# Patient Record
Sex: MALE | HISPANIC OR LATINO | Employment: FULL TIME | URBAN - METROPOLITAN AREA
[De-identification: names, ages, dates, MRNs, and addresses within clinical notes are randomized per-mention and may not be internally consistent; named-entity substitution may affect disease eponyms.]

---

## 2023-05-24 PROCEDURE — 99283 EMERGENCY DEPT VISIT LOW MDM: CPT

## 2023-05-25 ENCOUNTER — HOSPITAL ENCOUNTER (EMERGENCY)
Facility: CLINIC | Age: 38
Discharge: HOME | End: 2023-05-25
Attending: EMERGENCY MEDICINE | Admitting: EMERGENCY MEDICINE

## 2023-05-25 VITALS
RESPIRATION RATE: 18 BRPM | WEIGHT: 180 LBS | DIASTOLIC BLOOD PRESSURE: 83 MMHG | TEMPERATURE: 97.1 F | OXYGEN SATURATION: 97 % | HEART RATE: 61 BPM | SYSTOLIC BLOOD PRESSURE: 142 MMHG

## 2023-05-25 DIAGNOSIS — L60.0 INGROWN TOENAIL OF LEFT FOOT: ICD-10-CM

## 2023-05-25 PROCEDURE — 250N000013 HC RX MED GY IP 250 OP 250 PS 637: Performed by: EMERGENCY MEDICINE

## 2023-05-25 RX ORDER — CEPHALEXIN 500 MG/1
500 CAPSULE ORAL 4 TIMES DAILY
Qty: 28 CAPSULE | Refills: 0 | Status: SHIPPED | OUTPATIENT
Start: 2023-05-25 | End: 2023-06-01

## 2023-05-25 RX ORDER — IBUPROFEN 600 MG/1
600 TABLET, FILM COATED ORAL ONCE
Status: COMPLETED | OUTPATIENT
Start: 2023-05-25 | End: 2023-05-25

## 2023-05-25 RX ORDER — CEPHALEXIN 500 MG/1
500 CAPSULE ORAL ONCE
Status: COMPLETED | OUTPATIENT
Start: 2023-05-25 | End: 2023-05-25

## 2023-05-25 RX ADMIN — IBUPROFEN 600 MG: 600 TABLET, FILM COATED ORAL at 01:32

## 2023-05-25 RX ADMIN — CEPHALEXIN 500 MG: 500 CAPSULE ORAL at 01:32

## 2023-05-25 ASSESSMENT — ACTIVITIES OF DAILY LIVING (ADL): ADLS_ACUITY_SCORE: 33

## 2023-05-25 NOTE — ED PROVIDER NOTES
History     Chief Complaint:  Toe pain    The history is provided by the patient, a parent and a relative.      Marko Pastor is a 37 year old male who presents with ingrown nail on his left big toe. He has had this for two months. He tried to remove it himself but was not successful. The patient is not on any medication and has had no prior surgeries.       Independent Historian:    History was given by the patient and his family.     Review of External Notes:  None    ROS:  See HPI    Allergies:  No Known Allergies     Physical Exam     Patient Vitals for the past 24 hrs:   BP Temp Temp src Pulse Resp SpO2 Weight   05/25/23 0129 -- -- -- -- -- -- 81.6 kg (180 lb)   05/24/23 2259 (!) 142/83 97.1  F (36.2  C) Temporal 61 18 97 % --        Physical Exam  HENT:  mmm. Normal phonation.  Eyes: PERRL B/L  Neck: Supple  CV: Peripheral pulses in tact and regular  Resp: Speaking in full sentences without any respiratory distress  Musculoskeletal:  Left foot     No apparent bony tenderness.  There appears to be an ingrown toenail on the medial aspect of the left great toe.     Skin: Warm, dry, well perfused.  Mild amount of bleeding at the medial aspect of the left great toenail.  Patient reportedly was digging around in that area to try to remove the ingrown toenail.  Neuro: Alert, no gross motor or sensory deficits    Emergency Department Course   Interventions:  Medications   cephALEXin (KEFLEX) capsule 500 mg (500 mg Oral $Given 5/25/23 0132)   ibuprofen (ADVIL/MOTRIN) tablet 600 mg (600 mg Oral $Given 5/25/23 0132)        Assessments, Independent Interpretation, Consult/Discussion of ManagementTests:  ED Course as of 05/25/23 0137   u May 25, 2023   0115 I examined the patient and obtained the history above   0116 I obtained the history and examined the patient as noted above.          Social Determinants of Health affecting care:  None    Disposition:  The patient was discharged to home.     Impression &  Plan      Medical Decision Making:  This 37-year-old male patient presents to the ED due to concern for ingrown toenail.  Please see the HPI and exam for specifics.  Based on the patient's exam I believe he does have an ingrown toenail.  It seems like he tried to excise or cut some of this out though was not successful.  There is some bleeding though this is not a very large amount.  At this point I think that general wound care and consideration for antibiotic treatment (tablets) is reasonable.  I will encourage outpatient podiatry follow-up for formal excision of this ingrown toenail.     Critical Care time:  was 0 minutes for this patient excluding procedures.    Diagnosis:    ICD-10-CM    1. Ingrown toenail of left foot  L60.0            Discharge Medications:  New Prescriptions    No medications on file      5/25/2023   Jr Fabian DO     Scribe Disclosure:  ELIJAH GARDUNO, am serving as a scribe at 1:21 AM on 5/25/2023 to document services personally performed by Jr Fabian DO based on my observations and the provider's statements to me.    Hiral GARDUNO, am serving as a scribe  at 1:26 AM on 5/25/2023 to document services personally performed by Jr Fabian DO based on my observations and the provider's statements to me.       Jr Fabian DO  05/25/23 0137

## 2023-05-25 NOTE — ED TRIAGE NOTES
Patient reports his left big toe nail is ingrown.  He reports ongoing pain for about 1 month.  ABCs intact, A&Ox4.     Triage Assessment     Row Name 05/24/23 6217       Triage Assessment (Adult)    Airway WDL WDL       Respiratory WDL    Respiratory WDL WDL       Skin Circulation/Temperature WDL    Skin Circulation/Temperature WDL WDL       Cardiac WDL    Cardiac WDL WDL       Peripheral/Neurovascular WDL    Peripheral Neurovascular WDL WDL       Cognitive/Neuro/Behavioral WDL    Cognitive/Neuro/Behavioral WDL WDL

## 2023-05-25 NOTE — DISCHARGE INSTRUCTIONS
What do you do next:   Continue your home medications unless we have specifically changed them  You can use over-the-counter acetaminophen (Tylenol ) and ibuprofen for pain control for the next 2 to 3 days.  Acetaminophen (Tylenol): Take 500 to 1000 mg by mouth every 6 hours as needed for fever or pain.  Do not take more than 4000 total milligrams of acetaminophen-containing products in a 24-hour timeframe.  Ibuprofen: Take 600 milligrams by mouth every 6-8 hours as needed for fever or pain.  Take this with food or milk to avoid stomach upset.  Take the antibiotics I prescribed as directed.  Follow up as indicated below    When do you return: If you have uncontrollable pain, persistent bloody or foul-smelling drainage from the ingrown toenail, or any other symptoms that concern you, please return to the ED for reevaluation.    Thank you for allowing us to care for you today.

## 2024-01-07 ENCOUNTER — OFFICE VISIT (OUTPATIENT)
Dept: URGENT CARE | Facility: URGENT CARE | Age: 39
End: 2024-01-07

## 2024-01-07 ENCOUNTER — HOSPITAL ENCOUNTER (INPATIENT)
Facility: CLINIC | Age: 39
LOS: 7 days | Discharge: HOME | DRG: 391 | End: 2024-01-14
Attending: EMERGENCY MEDICINE | Admitting: HOSPITALIST

## 2024-01-07 ENCOUNTER — APPOINTMENT (OUTPATIENT)
Dept: CT IMAGING | Facility: CLINIC | Age: 39
DRG: 391 | End: 2024-01-07
Attending: EMERGENCY MEDICINE

## 2024-01-07 VITALS
SYSTOLIC BLOOD PRESSURE: 124 MMHG | TEMPERATURE: 99.7 F | HEART RATE: 85 BPM | DIASTOLIC BLOOD PRESSURE: 72 MMHG | OXYGEN SATURATION: 98 %

## 2024-01-07 DIAGNOSIS — R10.32 LLQ ABDOMINAL PAIN: Primary | ICD-10-CM

## 2024-01-07 DIAGNOSIS — K57.20 DIVERTICULITIS OF COLON WITH PERFORATION: ICD-10-CM

## 2024-01-07 LAB
ALBUMIN SERPL BCG-MCNC: 4.2 G/DL (ref 3.5–5.2)
ALBUMIN UR-MCNC: 30 MG/DL
ALP SERPL-CCNC: 67 U/L (ref 40–150)
ALT SERPL W P-5'-P-CCNC: 27 U/L (ref 0–70)
ANION GAP SERPL CALCULATED.3IONS-SCNC: 11 MMOL/L (ref 7–15)
APPEARANCE UR: CLEAR
AST SERPL W P-5'-P-CCNC: 16 U/L (ref 0–45)
BASOPHILS # BLD AUTO: 0 10E3/UL (ref 0–0.2)
BASOPHILS NFR BLD AUTO: 0 %
BILIRUB SERPL-MCNC: 0.7 MG/DL
BILIRUB UR QL STRIP: NEGATIVE
BUN SERPL-MCNC: 10.7 MG/DL (ref 6–20)
CALCIUM SERPL-MCNC: 9 MG/DL (ref 8.6–10)
CHLORIDE SERPL-SCNC: 99 MMOL/L (ref 98–107)
COLOR UR AUTO: YELLOW
CREAT SERPL-MCNC: 0.82 MG/DL (ref 0.67–1.17)
DEPRECATED HCO3 PLAS-SCNC: 26 MMOL/L (ref 22–29)
EGFRCR SERPLBLD CKD-EPI 2021: >90 ML/MIN/1.73M2
EOSINOPHIL # BLD AUTO: 0.1 10E3/UL (ref 0–0.7)
EOSINOPHIL NFR BLD AUTO: 0 %
ERYTHROCYTE [DISTWIDTH] IN BLOOD BY AUTOMATED COUNT: 12 % (ref 10–15)
GLUCOSE SERPL-MCNC: 106 MG/DL (ref 70–99)
GLUCOSE UR STRIP-MCNC: NEGATIVE MG/DL
HCT VFR BLD AUTO: 42.9 % (ref 40–53)
HGB BLD-MCNC: 14.8 G/DL (ref 13.3–17.7)
HGB UR QL STRIP: ABNORMAL
IMM GRANULOCYTES # BLD: 0.1 10E3/UL
IMM GRANULOCYTES NFR BLD: 1 %
KETONES UR STRIP-MCNC: 40 MG/DL
LEUKOCYTE ESTERASE UR QL STRIP: NEGATIVE
LIPASE SERPL-CCNC: 15 U/L (ref 13–60)
LYMPHOCYTES # BLD AUTO: 1.6 10E3/UL (ref 0.8–5.3)
LYMPHOCYTES NFR BLD AUTO: 9 %
MCH RBC QN AUTO: 31.3 PG (ref 26.5–33)
MCHC RBC AUTO-ENTMCNC: 34.5 G/DL (ref 31.5–36.5)
MCV RBC AUTO: 91 FL (ref 78–100)
MONOCYTES # BLD AUTO: 1 10E3/UL (ref 0–1.3)
MONOCYTES NFR BLD AUTO: 6 %
NEUTROPHILS # BLD AUTO: 14.8 10E3/UL (ref 1.6–8.3)
NEUTROPHILS NFR BLD AUTO: 84 %
NITRATE UR QL: NEGATIVE
NRBC # BLD AUTO: 0 10E3/UL
NRBC BLD AUTO-RTO: 0 /100
PH UR STRIP: 6 [PH] (ref 5–7)
PLATELET # BLD AUTO: 399 10E3/UL (ref 150–450)
POTASSIUM SERPL-SCNC: 3.9 MMOL/L (ref 3.4–5.3)
PROT SERPL-MCNC: 8.6 G/DL (ref 6.4–8.3)
RBC # BLD AUTO: 4.73 10E6/UL (ref 4.4–5.9)
RBC URINE: 7 /HPF
SODIUM SERPL-SCNC: 136 MMOL/L (ref 135–145)
SP GR UR STRIP: 1.03 (ref 1–1.03)
SQUAMOUS EPITHELIAL: 1 /HPF
UROBILINOGEN UR STRIP-MCNC: 2 MG/DL
WBC # BLD AUTO: 17.6 10E3/UL (ref 4–11)
WBC URINE: 2 /HPF

## 2024-01-07 PROCEDURE — 250N000009 HC RX 250: Performed by: EMERGENCY MEDICINE

## 2024-01-07 PROCEDURE — 250N000011 HC RX IP 250 OP 636: Performed by: HOSPITALIST

## 2024-01-07 PROCEDURE — 81001 URINALYSIS AUTO W/SCOPE: CPT | Performed by: EMERGENCY MEDICINE

## 2024-01-07 PROCEDURE — 96374 THER/PROPH/DIAG INJ IV PUSH: CPT | Mod: 59

## 2024-01-07 PROCEDURE — 120N000001 HC R&B MED SURG/OB

## 2024-01-07 PROCEDURE — 250N000011 HC RX IP 250 OP 636: Performed by: EMERGENCY MEDICINE

## 2024-01-07 PROCEDURE — 99207 PR NO CHARGE LOS: CPT | Performed by: FAMILY MEDICINE

## 2024-01-07 PROCEDURE — 80053 COMPREHEN METABOLIC PANEL: CPT | Performed by: EMERGENCY MEDICINE

## 2024-01-07 PROCEDURE — 99285 EMERGENCY DEPT VISIT HI MDM: CPT | Mod: 25

## 2024-01-07 PROCEDURE — 258N000003 HC RX IP 258 OP 636: Performed by: HOSPITALIST

## 2024-01-07 PROCEDURE — 99222 1ST HOSP IP/OBS MODERATE 55: CPT | Performed by: HOSPITALIST

## 2024-01-07 PROCEDURE — 85025 COMPLETE CBC W/AUTO DIFF WBC: CPT | Performed by: EMERGENCY MEDICINE

## 2024-01-07 PROCEDURE — 36415 COLL VENOUS BLD VENIPUNCTURE: CPT | Performed by: EMERGENCY MEDICINE

## 2024-01-07 PROCEDURE — 74177 CT ABD & PELVIS W/CONTRAST: CPT

## 2024-01-07 PROCEDURE — 83690 ASSAY OF LIPASE: CPT | Performed by: EMERGENCY MEDICINE

## 2024-01-07 RX ORDER — CALCIUM CARBONATE 500 MG/1
1000 TABLET, CHEWABLE ORAL 4 TIMES DAILY PRN
Status: DISCONTINUED | OUTPATIENT
Start: 2024-01-07 | End: 2024-01-14 | Stop reason: HOSPADM

## 2024-01-07 RX ORDER — SODIUM CHLORIDE, SODIUM LACTATE, POTASSIUM CHLORIDE, CALCIUM CHLORIDE 600; 310; 30; 20 MG/100ML; MG/100ML; MG/100ML; MG/100ML
INJECTION, SOLUTION INTRAVENOUS CONTINUOUS
Status: DISCONTINUED | OUTPATIENT
Start: 2024-01-07 | End: 2024-01-13

## 2024-01-07 RX ORDER — PIPERACILLIN SODIUM, TAZOBACTAM SODIUM 4; .5 G/20ML; G/20ML
4.5 INJECTION, POWDER, LYOPHILIZED, FOR SOLUTION INTRAVENOUS EVERY 6 HOURS
Status: DISCONTINUED | OUTPATIENT
Start: 2024-01-08 | End: 2024-01-14

## 2024-01-07 RX ORDER — IOPAMIDOL 755 MG/ML
500 INJECTION, SOLUTION INTRAVASCULAR ONCE
Status: COMPLETED | OUTPATIENT
Start: 2024-01-07 | End: 2024-01-07

## 2024-01-07 RX ORDER — PIPERACILLIN SODIUM, TAZOBACTAM SODIUM 4; .5 G/20ML; G/20ML
4.5 INJECTION, POWDER, LYOPHILIZED, FOR SOLUTION INTRAVENOUS ONCE
Status: COMPLETED | OUTPATIENT
Start: 2024-01-07 | End: 2024-01-07

## 2024-01-07 RX ORDER — HYDROMORPHONE HCL IN WATER/PF 6 MG/30 ML
0.4 PATIENT CONTROLLED ANALGESIA SYRINGE INTRAVENOUS
Status: DISCONTINUED | OUTPATIENT
Start: 2024-01-07 | End: 2024-01-14 | Stop reason: HOSPADM

## 2024-01-07 RX ORDER — ONDANSETRON 4 MG/1
4 TABLET, ORALLY DISINTEGRATING ORAL EVERY 6 HOURS PRN
Status: DISCONTINUED | OUTPATIENT
Start: 2024-01-07 | End: 2024-01-14 | Stop reason: HOSPADM

## 2024-01-07 RX ORDER — OXYCODONE HYDROCHLORIDE 5 MG/1
5 TABLET ORAL EVERY 4 HOURS PRN
Status: DISCONTINUED | OUTPATIENT
Start: 2024-01-07 | End: 2024-01-14 | Stop reason: HOSPADM

## 2024-01-07 RX ORDER — KETOROLAC TROMETHAMINE 15 MG/ML
15 INJECTION, SOLUTION INTRAMUSCULAR; INTRAVENOUS ONCE
Status: COMPLETED | OUTPATIENT
Start: 2024-01-07 | End: 2024-01-07

## 2024-01-07 RX ORDER — HYDROMORPHONE HYDROCHLORIDE 1 MG/ML
0.5 INJECTION, SOLUTION INTRAMUSCULAR; INTRAVENOUS; SUBCUTANEOUS EVERY 30 MIN PRN
Status: DISCONTINUED | OUTPATIENT
Start: 2024-01-07 | End: 2024-01-09

## 2024-01-07 RX ORDER — LIDOCAINE 40 MG/G
CREAM TOPICAL
Status: DISCONTINUED | OUTPATIENT
Start: 2024-01-07 | End: 2024-01-14 | Stop reason: HOSPADM

## 2024-01-07 RX ORDER — ACETAMINOPHEN 325 MG/1
650 TABLET ORAL EVERY 4 HOURS PRN
Status: DISCONTINUED | OUTPATIENT
Start: 2024-01-07 | End: 2024-01-14 | Stop reason: HOSPADM

## 2024-01-07 RX ORDER — ONDANSETRON 2 MG/ML
4 INJECTION INTRAMUSCULAR; INTRAVENOUS EVERY 30 MIN PRN
Status: DISCONTINUED | OUTPATIENT
Start: 2024-01-07 | End: 2024-01-14 | Stop reason: HOSPADM

## 2024-01-07 RX ORDER — ACETAMINOPHEN 650 MG/1
650 SUPPOSITORY RECTAL EVERY 4 HOURS PRN
Status: DISCONTINUED | OUTPATIENT
Start: 2024-01-07 | End: 2024-01-14 | Stop reason: HOSPADM

## 2024-01-07 RX ORDER — HYDROMORPHONE HCL IN WATER/PF 6 MG/30 ML
0.2 PATIENT CONTROLLED ANALGESIA SYRINGE INTRAVENOUS
Status: DISCONTINUED | OUTPATIENT
Start: 2024-01-07 | End: 2024-01-14 | Stop reason: HOSPADM

## 2024-01-07 RX ORDER — ONDANSETRON 2 MG/ML
4 INJECTION INTRAMUSCULAR; INTRAVENOUS EVERY 6 HOURS PRN
Status: DISCONTINUED | OUTPATIENT
Start: 2024-01-07 | End: 2024-01-14 | Stop reason: HOSPADM

## 2024-01-07 RX ADMIN — IOPAMIDOL 100 ML: 755 INJECTION, SOLUTION INTRAVENOUS at 16:28

## 2024-01-07 RX ADMIN — PIPERACILLIN AND TAZOBACTAM 4.5 G: 4; .5 INJECTION, POWDER, FOR SOLUTION INTRAVENOUS at 18:13

## 2024-01-07 RX ADMIN — KETOROLAC TROMETHAMINE 15 MG: 15 INJECTION, SOLUTION INTRAMUSCULAR; INTRAVENOUS at 15:47

## 2024-01-07 RX ADMIN — SODIUM CHLORIDE 65 ML: 9 INJECTION, SOLUTION INTRAVENOUS at 16:28

## 2024-01-07 RX ADMIN — SODIUM CHLORIDE, POTASSIUM CHLORIDE, SODIUM LACTATE AND CALCIUM CHLORIDE: 600; 310; 30; 20 INJECTION, SOLUTION INTRAVENOUS at 20:18

## 2024-01-07 RX ADMIN — PIPERACILLIN AND TAZOBACTAM 4.5 G: 4; .5 INJECTION, POWDER, FOR SOLUTION INTRAVENOUS at 23:27

## 2024-01-07 RX ADMIN — HYDROMORPHONE HYDROCHLORIDE 0.5 MG: 1 INJECTION, SOLUTION INTRAMUSCULAR; INTRAVENOUS; SUBCUTANEOUS at 23:27

## 2024-01-07 ASSESSMENT — ACTIVITIES OF DAILY LIVING (ADL)
ADLS_ACUITY_SCORE: 35

## 2024-01-07 ASSESSMENT — PAIN SCALES - GENERAL: PAINLEVEL: EXTREME PAIN (9)

## 2024-01-07 NOTE — H&P
[facility]    History and Physical  Hospitalist       Date of Admission:  1/7/2024    Assessment & Plan   Marko Pastor is a 38 year old male without any significant past medical history who presents with abdominal pain.    # Perforated sigmoid diverticulitis with trace pneumoperitoneum and small phlegmon: Patient notes that he has had left lower quadrant abdominal pain that started about 1.5 weeks ago.  He notes that it has been worse when he has a bowel movement or when he moves suddenly.  He did have 1 episode of nausea with nonbloody emesis yesterday.  Denies any loose or watery stools.  No blood in the stool.  He notes that his abdominal pain worsened this morning so went to urgent care for presentation.  He denies any prior hospitalizations.  No prior colonoscopies.  He is not taking any prescription medications.  He works as a  and is on his feet for most of the day.  -ED, patient with low-grade fever of 99.7, nontachycardic and normotensive.  Saturating okay on room air.  CBC with leukocytosis to 17.6.  BMP unremarkable.  LFTs within normal limits.  Lipase within normal limits.  UA unremarkable.  CT abdomen pelvis done that showed a perforated sigmoid diverticulitis with trace pneumoperitoneum and small phlegmon adjacent to the sigmoid colon with no coalescing abscess or drainable collection.  Patient given Zosyn therapy.  -Bowel rest with N.p.o., IV fluids.  As needed pain medications are available.  Continue IV Zosyn.  -Colorectal surgery consulted.      DVT Prophylaxis: Pneumatic Compression Devices  Code Status: Full Code  Dispo: Admit to inpatient    Eric Rios MD    Primary Care Physician   Physician No Ref-Primary    Chief Complaint   Abdominal pain    History is obtained from the patient, patient's chart and discussed with ER physician    An iPhone  was utilized to interview the patient and obtain history.    History of Present Illness   Marko  Arsalan Pastor is a 38 year old male without any significant past medical history who presents with abdominal pain.    Patient notes that he has had left lower quadrant abdominal pain that started about 1.5 weeks ago.  He notes that it has been worse when he has a bowel movement or when he moves suddenly.  He did have 1 episode of nausea with nonbloody emesis yesterday.  Denies any loose or watery stools.  No blood in the stool.  He notes that his abdominal pain worsened this morning so went to urgent care for presentation.  He denies any prior hospitalizations.  No prior colonoscopies.  He is not taking any prescription medications.  He works as a  and is on his feet for most of the day.    In the ED, patient with low-grade fever of 99.7, nontachycardic and normotensive.  Saturating okay on room air.  CBC with leukocytosis to 17.6.  BMP unremarkable.  LFTs within normal limits.  Lipase within normal limits.  UA unremarkable.  CT abdomen pelvis done that showed a perforated sigmoid diverticulitis with trace pneumoperitoneum and small phlegmon adjacent to the sigmoid colon with no coalescing abscess or drainable collection.  Patient given Zosyn therapy.    Past Medical History    None    Past Surgical History   None    Prior to Admission Medications   None     Allergies   No Known Allergies    Social History   I have reviewed this patient's social history and updated it with pertinent information if needed. Marko Pastor  reports that he has never smoked. He has never used smokeless tobacco.    Family History   I have reviewed this patient's family history and updated it with pertinent information if needed.   No family history on file.    Review of Systems   The 10 point Review of Systems is negative other than noted in the HPI or here.     Physical Exam   Temp: 99.7  F (37.6  C) Temp src: Oral BP: (!) 125/118 Pulse: 72   Resp: 18 SpO2: 100 % O2 Device: None (Room air)    Vital Signs with  Ranges  Temp:  [99.7  F (37.6  C)] 99.7  F (37.6  C)  Pulse:  [72-85] 72  Resp:  [18] 18  BP: (124-125)/() 125/118  SpO2:  [98 %-100 %] 100 %  199 lbs 11.79 oz    Constitutional: NAD, Nontoxic  HEENT: Normocephalic, MMM, no elevation of JVD noted  Respiratory: Nl WOB, Clear bilaterally, No wheezes, no crackles  Cardiovascular: Regular, no murmur  GI: Soft, bowel sounds present.  He does have tenderness to the left lower quadrant without rebound or guarding.  Lymph/Hematologic: No bruising. No cervical LAD  Skin: No rash  Musculoskeletal: Nl Tone, No edema noted  Neurologic: A&Ox3, Answers appropriately. CNII-XII intact. Moves all extremities. No tremor  Psychiatric: Calm    Data   Data reviewed today:  I personally reviewed   Recent Labs   Lab 01/07/24  1543   WBC 17.6*   HGB 14.8   MCV 91         POTASSIUM 3.9   CHLORIDE 99   CO2 26   BUN 10.7   CR 0.82   ANIONGAP 11   SANTOS 9.0   *   ALBUMIN 4.2   PROTTOTAL 8.6*   BILITOTAL 0.7   ALKPHOS 67   ALT 27   AST 16   LIPASE 15       Recent Results (from the past 24 hour(s))   CT Abdomen Pelvis w Contrast    Narrative    EXAM: CT ABDOMEN PELVIS W CONTRAST  LOCATION: [facility]  DATE: 1/7/2024    INDICATION: llq pain, constipation, suspect sigmoid diveriticulits  COMPARISON: None.  TECHNIQUE: CT scan of the abdomen and pelvis was performed following injection of IV contrast. Multiplanar reformats were obtained. Dose reduction techniques were used.  CONTRAST: 100mL Isovue 370    FINDINGS:   LOWER CHEST: Lung bases are clear.    HEPATOBILIARY: Probable mild hepatic steatosis. No worrisome liver lesions. Normal gallbladder. No biliary ductal dilation.    PANCREAS: Normal.    SPLEEN: Normal.    ADRENAL GLANDS: Normal.    KIDNEYS/BLADDER: Normal.    BOWEL: There or findings of perforated sigmoid diverticulitis with extensive infiltration of the lower abdominal mesentery, trace pneumoperitoneum, and a 2.7 x 2.9 x 4.2 cm phlegmon  adjacent to the sigmoid colon (3/154). No discrete/coalescing abscess or   drainable fluid collection.    Elsewhere bowel is normal in caliber. The appendix is normal.    LYMPH NODES: No lymphadenopathy.    VASCULATURE: Unremarkable.    PELVIC ORGANS: Normal contours.    MUSCULOSKELETAL: No acute abnormality.      Impression    IMPRESSION:   1.  Perforated sigmoid diverticulitis with trace pneumoperitoneum and small phlegmon adjacent to the sigmoid colon. No coalescing abscess or drainable collection.  2.  Additional details in the findings.       Clinically Significant Risk Factors Present on Admission

## 2024-01-07 NOTE — ED PROVIDER NOTES
History     Chief Complaint:  Abdominal Pain      used: Friend.      Marko Pastor is a 38 year old male the presents to the emergency room with abdominal pain. The patient states he has had left lower quadrant abdominal pain since Monday but it has progressively got worse. He went to urgent care and was told to come to the emergency room. He claims he has been constipated since the pain has started. He does report pain in his back or testicles. Denies history of kidney stone, abdominal surgery, or hernia.     Independent Historian:    None    Review of External Notes:  N/A    Medications:    The patient is currently on no regular medications.    Past Medical History:    There is no pertinent past medical history     Physical Exam   Patient Vitals for the past 24 hrs:   BP Temp Temp src Pulse Resp SpO2 Weight   01/07/24 1522 (!) 125/118 99.7  F (37.6  C) Oral 72 18 100 % 90.6 kg (199 lb 11.8 oz)      Physical Exam  GENERAL: well developed, pleasant  HEAD: atraumatic  EYES: pupils reactive, extraocular muscles intact, conjunctivae normal  ENT:  mucus membranes moist  NECK:  trachea midline, normal range of motion  RESPIRATORY: no tachypnea, breath sounds clear to auscultation   CVS: normal S1/S2, no murmurs, intact distal pulses  ABDOMEN: soft, nondistention. Left lower quadrant abdominal pain. Suprapubic lower abdominal pain .   MUSCULOSKELETAL: no deformities  SKIN: warm and dry, no acute rashes or ulceration  NEURO: GCS 15, cranial nerves intact, alert and oriented x3  PSYCH:  Mood/affect normal     Emergency Department Course   Imaging:  CT Abdomen Pelvis w Contrast   Final Result   IMPRESSION:    1.  Perforated sigmoid diverticulitis with trace pneumoperitoneum and small phlegmon adjacent to the sigmoid colon. No coalescing abscess or drainable collection.   2.  Additional details in the findings.        Report per radiology    Laboratory:  Labs Ordered and Resulted from Time of  ED Arrival to Time of ED Departure   ROUTINE UA WITH MICROSCOPIC REFLEX TO CULTURE - Abnormal       Result Value    Color Urine Yellow      Appearance Urine Clear      Glucose Urine Negative      Bilirubin Urine Negative      Ketones Urine 40 (*)     Specific Gravity Urine 1.029      Blood Urine Small (*)     pH Urine 6.0      Protein Albumin Urine 30 (*)     Urobilinogen Urine 2.0      Nitrite Urine Negative      Leukocyte Esterase Urine Negative      RBC Urine 7 (*)     WBC Urine 2      Squamous Epithelials Urine 1     COMPREHENSIVE METABOLIC PANEL - Abnormal    Sodium 136      Potassium 3.9      Carbon Dioxide (CO2) 26      Anion Gap 11      Urea Nitrogen 10.7      Creatinine 0.82      GFR Estimate >90      Calcium 9.0      Chloride 99      Glucose 106 (*)     Alkaline Phosphatase 67      AST 16      ALT 27      Protein Total 8.6 (*)     Albumin 4.2      Bilirubin Total 0.7     CBC WITH PLATELETS AND DIFFERENTIAL - Abnormal    WBC Count 17.6 (*)     RBC Count 4.73      Hemoglobin 14.8      Hematocrit 42.9      MCV 91      MCH 31.3      MCHC 34.5      RDW 12.0      Platelet Count 399      % Neutrophils 84      % Lymphocytes 9      % Monocytes 6      % Eosinophils 0      % Basophils 0      % Immature Granulocytes 1      NRBCs per 100 WBC 0      Absolute Neutrophils 14.8 (*)     Absolute Lymphocytes 1.6      Absolute Monocytes 1.0      Absolute Eosinophils 0.1      Absolute Basophils 0.0      Absolute Immature Granulocytes 0.1      Absolute NRBCs 0.0     LIPASE - Normal    Lipase 15       Emergency Department Course & Assessments:     Interventions:  Medications   ketorolac (TORADOL) injection 15 mg (15 mg Intravenous $Given 1/7/24 1547)   CT Scan Flush (65 mLs Intravenous $Given 1/7/24 1628)   iopamidol (ISOVUE-370) solution 500 mL (100 mLs Intravenous $Given 1/7/24 1628)   piperacillin-tazobactam (ZOSYN) 4.5 g vial to attach to  mL bag (4.5 g Intravenous $New Bag 1/7/24 1813)      Assessments:  1533 I  obtained history and examined the patient as noted above.     Independent Interpretation (X-rays, CTs, rhythm strip):  N/A      Consultations/Discussion of Management or Tests:  1748 I spoke with Dr Rios of the hospitalist service regarding admission.   1755 I spoke with colorectal surgery.    Social Determinants of Health affecting care:  None      Disposition:  The patient was admitted to the hospital under the care of Dr. iRos.     Impression & Plan    Medical Decision Making:    Patient presents with lower abdominal pain is fairly point specific.  Certainly diverticulitis seems highest on the differential.  CT shows perforation without drainable abscess and white count of 17,000.  Patient overall does not look toxic despite the laboratory tests and imaging.  Patient was given fluids antibiotics and pain control.  Spoke with hospitalist as well as colorectal surgery.      Diagnosis:    ICD-10-CM    1. Diverticulitis of colon with perforation  K57.20            Scribe Disclosure:  IDebbie, am serving as a scribe at 5:50 PM on 1/7/2024 to document services personally performed by Sung Escamilla MD based on my observations and the provider's statements to me.    Scribe Disclosure:  IYamileth, am serving as a scribe  at 6:09 PM on 1/7/2024 to document services personally performed by Sung Escamilla MD based on my observations and the provider's statements to me.    1/7/2024   Sung Escamilla MD Adams, Shaun L, MD  01/07/24 4871

## 2024-01-07 NOTE — PROGRESS NOTES
Triage note:  suddenly worsening LLQ pain x 2 days.  No history of abdominal surgeries.  Extremely tender LLQ on exam with guarding and rebound tenderness.  Low grade temp on rooming here in .  Need to rule out diverticulitis, atypical presentation of appendicitis among other concerns.    Needs more imaging and evaluation that what we can provide in our facility.  With the help of the , I explained that the patient needs to seek care in the ER right away.  I advised that he not eat or drink anything until given clearance to do so by ER staff.  He has a friend who drove him to  who will provide transportation to the ER.

## 2024-01-07 NOTE — LETTER
[facility]  [address]  [facility] [address]  Phone: [phone]  Fax: [phone]    January 14, 2024        Marko Garzaprabhjotlayne Pastor  [address] [facility] [address]          To whom it may concern:    RE: Marko Arsalan Pastor    [facility] a medical illness. If he continues to improve he can return to work on 1/17/2024. Of note due to his medical illness he may need additional days off of work for follow up imaging and specialist appointments in the coming weeks. But if feeling well is ok to return to work as noted above.    Please contact me for questions or concerns.      Sincerely,    Dr. Leroy Rivera

## 2024-01-07 NOTE — ED TRIAGE NOTES
Patient c/o left lower abdominal pain for the past week and a half, previously at  and they sent him over here. Denies any N/V/D. Has not taken anything for pain or discomfort. Rates pain 10/10. ABCs intact, VSS.

## 2024-01-08 LAB
ANION GAP SERPL CALCULATED.3IONS-SCNC: 10 MMOL/L (ref 7–15)
BUN SERPL-MCNC: 13 MG/DL (ref 6–20)
CALCIUM SERPL-MCNC: 8.4 MG/DL (ref 8.6–10)
CHLORIDE SERPL-SCNC: 102 MMOL/L (ref 98–107)
CREAT SERPL-MCNC: 0.85 MG/DL (ref 0.67–1.17)
DEPRECATED HCO3 PLAS-SCNC: 25 MMOL/L (ref 22–29)
EGFRCR SERPLBLD CKD-EPI 2021: >90 ML/MIN/1.73M2
ERYTHROCYTE [DISTWIDTH] IN BLOOD BY AUTOMATED COUNT: 12 % (ref 10–15)
GLUCOSE SERPL-MCNC: 98 MG/DL (ref 70–99)
HCT VFR BLD AUTO: 38.7 % (ref 40–53)
HGB BLD-MCNC: 13.2 G/DL (ref 13.3–17.7)
MCH RBC QN AUTO: 31.2 PG (ref 26.5–33)
MCHC RBC AUTO-ENTMCNC: 34.1 G/DL (ref 31.5–36.5)
MCV RBC AUTO: 92 FL (ref 78–100)
PLATELET # BLD AUTO: 363 10E3/UL (ref 150–450)
POTASSIUM SERPL-SCNC: 3.7 MMOL/L (ref 3.4–5.3)
RBC # BLD AUTO: 4.23 10E6/UL (ref 4.4–5.9)
SODIUM SERPL-SCNC: 137 MMOL/L (ref 135–145)
WBC # BLD AUTO: 16.2 10E3/UL (ref 4–11)

## 2024-01-08 PROCEDURE — 85027 COMPLETE CBC AUTOMATED: CPT | Performed by: HOSPITALIST

## 2024-01-08 PROCEDURE — 258N000003 HC RX IP 258 OP 636: Performed by: HOSPITALIST

## 2024-01-08 PROCEDURE — 250N000011 HC RX IP 250 OP 636: Performed by: HOSPITALIST

## 2024-01-08 PROCEDURE — 80048 BASIC METABOLIC PNL TOTAL CA: CPT | Performed by: HOSPITALIST

## 2024-01-08 PROCEDURE — 99232 SBSQ HOSP IP/OBS MODERATE 35: CPT | Performed by: HOSPITALIST

## 2024-01-08 PROCEDURE — 36415 COLL VENOUS BLD VENIPUNCTURE: CPT | Performed by: HOSPITALIST

## 2024-01-08 PROCEDURE — 250N000011 HC RX IP 250 OP 636: Performed by: EMERGENCY MEDICINE

## 2024-01-08 PROCEDURE — 120N000001 HC R&B MED SURG/OB

## 2024-01-08 RX ORDER — NALOXONE HYDROCHLORIDE 0.4 MG/ML
0.4 INJECTION, SOLUTION INTRAMUSCULAR; INTRAVENOUS; SUBCUTANEOUS
Status: DISCONTINUED | OUTPATIENT
Start: 2024-01-08 | End: 2024-01-14 | Stop reason: HOSPADM

## 2024-01-08 RX ORDER — NALOXONE HYDROCHLORIDE 0.4 MG/ML
0.2 INJECTION, SOLUTION INTRAMUSCULAR; INTRAVENOUS; SUBCUTANEOUS
Status: DISCONTINUED | OUTPATIENT
Start: 2024-01-08 | End: 2024-01-14 | Stop reason: HOSPADM

## 2024-01-08 RX ADMIN — HYDROMORPHONE HYDROCHLORIDE 0.5 MG: 1 INJECTION, SOLUTION INTRAMUSCULAR; INTRAVENOUS; SUBCUTANEOUS at 05:57

## 2024-01-08 RX ADMIN — PIPERACILLIN AND TAZOBACTAM 4.5 G: 4; .5 INJECTION, POWDER, FOR SOLUTION INTRAVENOUS at 17:33

## 2024-01-08 RX ADMIN — HYDROMORPHONE HYDROCHLORIDE 0.4 MG: 0.2 INJECTION, SOLUTION INTRAMUSCULAR; INTRAVENOUS; SUBCUTANEOUS at 16:10

## 2024-01-08 RX ADMIN — PIPERACILLIN AND TAZOBACTAM 4.5 G: 4; .5 INJECTION, POWDER, FOR SOLUTION INTRAVENOUS at 11:17

## 2024-01-08 RX ADMIN — SODIUM CHLORIDE, POTASSIUM CHLORIDE, SODIUM LACTATE AND CALCIUM CHLORIDE: 600; 310; 30; 20 INJECTION, SOLUTION INTRAVENOUS at 06:20

## 2024-01-08 RX ADMIN — PIPERACILLIN AND TAZOBACTAM 4.5 G: 4; .5 INJECTION, POWDER, FOR SOLUTION INTRAVENOUS at 05:34

## 2024-01-08 RX ADMIN — HYDROMORPHONE HYDROCHLORIDE 0.4 MG: 0.2 INJECTION, SOLUTION INTRAMUSCULAR; INTRAVENOUS; SUBCUTANEOUS at 19:41

## 2024-01-08 RX ADMIN — HYDROMORPHONE HYDROCHLORIDE 0.2 MG: 0.2 INJECTION, SOLUTION INTRAMUSCULAR; INTRAVENOUS; SUBCUTANEOUS at 11:13

## 2024-01-08 ASSESSMENT — ACTIVITIES OF DAILY LIVING (ADL)
ADLS_ACUITY_SCORE: 18
ADLS_ACUITY_SCORE: 35
ADLS_ACUITY_SCORE: 35
ADLS_ACUITY_SCORE: 18
ADLS_ACUITY_SCORE: 18
ADLS_ACUITY_SCORE: 35
ADLS_ACUITY_SCORE: 35
ADLS_ACUITY_SCORE: 18
CHANGE_IN_FUNCTIONAL_STATUS_SINCE_ONSET_OF_CURRENT_ILLNESS/INJURY: NO
ADLS_ACUITY_SCORE: 18
ADLS_ACUITY_SCORE: 18
ADLS_ACUITY_SCORE: 35
FALL_HISTORY_WITHIN_LAST_SIX_MONTHS: NO
ADLS_ACUITY_SCORE: 18

## 2024-01-08 NOTE — CONSULTS
[facility]  Colon and Rectal Surgery Consult Note  Name: Marko Pastor    MRN: [id number]  YOB: 1985    Age: 38 year old  Date of admission: 1/7/2024  Primary care provider: No Ref-Primary, Physician     Requesting Physician:  Dr. Rios  Reason for consult: Abdominal pain and diverticulitis           History of Present Illness:   Marko Pastor is a 38 year old male, seen at the request of Dr. Rios, presents with perforated diverticulitis.  He has had about 10 days of progressive left lower quadrant pain that was significant with moving.  This has progressed to include some nausea and vomiting yesterday which prompted him to come the emergency room.  He denies having prior episodes of this.  No prior abdominal surgery.  He generally has regular bowel movements.    Workup at the ER included a CBC with a white count of 17, and a CT scan of the abdomen and pelvis which shows inflammation of the sigmoid colon with air and inflammation in the mesentery.  No significant drainable abscess.  He was started on Zosyn.    Colonoscopy History: No prior colonoscopy    Conversation was carried out through the  online            Past Medical History:   No past medical history on file.          Past Surgical History:   No past surgical history on file.            Social History:     Social History     Tobacco Use    Smoking status: Never    Smokeless tobacco: Never   Substance Use Topics    Alcohol use: Not on file             Family History:   No family history on file.          Allergies:   No Known Allergies          Medications:      piperacillin-tazobactam  4.5 g Intravenous Q6H    sodium chloride (PF)  3 mL Intracatheter Q8H             Review of Systems:   A comprehensive greater than 10 system review of systems was carried out.  Pertinent positives and negatives are noted above.  Otherwise negative for contributory info.            Physical Exam:     Blood pressure  103/62, pulse 75, temperature 98  F (36.7  C), temperature source Oral, resp. rate 17, weight 90.6 kg (199 lb 11.8 oz), SpO2 98%.  No intake or output data in the 24 hours ending 01/08/24 1157  EXAM:  GEN: Awake alert and oriented, appears his stated age  PULM: Non-labored breathing with normal respiratory effort  CVS: reg rate and rhythm, no peripheral edema  ABD: Soft, mild tenderness in the left lower quadrant without rebound or guarding.   RECTAL: Rectal exam was deferred at this time  NEURO: CN II-XII grossly intact  MSK: extremeties with no clubbing, cyanosis or edema; able to ambulate  PSYCH: responsive, alert, cooperative; oriented x3; appropriate mood and affect  EXT/SKIN: inspection reveals no rashes, lesions or ulcers, normal coloring         Data Reviewed:     Results for orders placed or performed during the hospital encounter of 01/07/24   CT Abdomen Pelvis w Contrast    Narrative    EXAM: CT ABDOMEN PELVIS W CONTRAST  LOCATION: [facility]  DATE: 1/7/2024    INDICATION: llq pain, constipation, suspect sigmoid diveriticulits  COMPARISON: None.  TECHNIQUE: CT scan of the abdomen and pelvis was performed following injection of IV contrast. Multiplanar reformats were obtained. Dose reduction techniques were used.  CONTRAST: 100mL Isovue 370    FINDINGS:   LOWER CHEST: Lung bases are clear.    HEPATOBILIARY: Probable mild hepatic steatosis. No worrisome liver lesions. Normal gallbladder. No biliary ductal dilation.    PANCREAS: Normal.    SPLEEN: Normal.    ADRENAL GLANDS: Normal.    KIDNEYS/BLADDER: Normal.    BOWEL: There or findings of perforated sigmoid diverticulitis with extensive infiltration of the lower abdominal mesentery, trace pneumoperitoneum, and a 2.7 x 2.9 x 4.2 cm phlegmon adjacent to the sigmoid colon (3/154). No discrete/coalescing abscess or   drainable fluid collection.    Elsewhere bowel is normal in caliber. The appendix is normal.    LYMPH NODES: No  "lymphadenopathy.    VASCULATURE: Unremarkable.    PELVIC ORGANS: Normal contours.    MUSCULOSKELETAL: No acute abnormality.      Impression    IMPRESSION:   1.  Perforated sigmoid diverticulitis with trace pneumoperitoneum and small phlegmon adjacent to the sigmoid colon. No coalescing abscess or drainable collection.  2.  Additional details in the findings.       Recent Labs   Lab 01/08/24  0912 01/07/24  1543   WBC 16.2* 17.6*   HGB 13.2* 14.8   HCT 38.7* 42.9   MCV 92 91    399          Lab Results   Component Value Date     01/08/2024     01/07/2024    Lab Results   Component Value Date    CHLORIDE 102 01/08/2024    CHLORIDE 99 01/07/2024    Lab Results   Component Value Date    BUN 13.0 01/08/2024    BUN 10.7 01/07/2024      Lab Results   Component Value Date    POTASSIUM 3.7 01/08/2024    POTASSIUM 3.9 01/07/2024    Lab Results   Component Value Date    CO2 25 01/08/2024    CO2 26 01/07/2024    Lab Results   Component Value Date    CR 0.85 01/08/2024    CR 0.82 01/07/2024        Recent Labs   Lab 01/07/24  1543   AST 16   ALT 27   ALKPHOS 67   BILITOTAL 0.7     No results for input(s): \"LACT\" in the last 168 hours.      Assessment and Plan:   Isai is a 38-year-old with his first attack of diverticulitis.  There is a pericolonic phlegmon extending into the mesentery on his scan.  He is stable at this time and has been admitted and placed on IV antibiotics.  We discussed the role for limited p.o. with just clear liquids, IV antibiotics, and serial exams.  We discussed that if he were to worsen or fails to improve we may need to consider surgery.  Surgery in the urgent setting often include the stoma.  Plan:  Admit to hospitalist  Surgery: No plan for urgent surgery at this time  Diet: Limited clear liquids  IV Fluids: Per hospitalist  Antibiotics: On Zosyn, continue  Medications: Per hospitalist  I&O s:  strict I&O s  Labs:   - Reviewed: Reviewed, white count slightly downtrending  - " Ordered: Repeat CBC in the morning  Imaging:   -I have reviewed the CT scan images and reports.  Will plan for follow-up CT scan in 10 to 14 days assuming he does well, may need to repeat sooner if he fails to improve or worsens.  Activity: As tolerated  DVT prophylaxis: SCD s,       Patient specific identified risk factors considered as part of today s evaluation include: Perforated diverticulitis (Blood thinners, BMI>30, Smoker, Diabetic etc. etc.)      Additional history obtained from the patient with an , in the chart.  Time spent on consultation: 60 minutes     Sandy Dale MD  [facility]  Office Phone # [phone]

## 2024-01-08 NOTE — PROGRESS NOTES
[facility]    Hospitalist Progress Note      Assessment & Plan   Marko Pastor is a 38 year old male without any significant past medical history who presents with abdominal pain.     # Perforated sigmoid diverticulitis with trace pneumoperitoneum and small phlegmon: Patient notes that he has had left lower quadrant abdominal pain that started about 1.5 weeks ago.  He notes that it has been worse when he has a bowel movement or when he moves suddenly.  He did have 1 episode of nausea with nonbloody emesis yesterday.  Denies any loose or watery stools.  No blood in the stool.  He notes that his abdominal pain worsened this morning so went to urgent care for presentation.  He denies any prior hospitalizations.  No prior colonoscopies.  He is not taking any prescription medications.  He works as a  and is on his feet for most of the day.  -ED, patient with low-grade fever of 99.7, nontachycardic and normotensive.  Saturating okay on room air.  CBC with leukocytosis to 17.6.  BMP unremarkable.  LFTs within normal limits.  Lipase within normal limits.  UA unremarkable.  CT abdomen pelvis done that showed a perforated sigmoid diverticulitis with trace pneumoperitoneum and small phlegmon adjacent to the sigmoid colon with no coalescing abscess or drainable collection.  Patient given Zosyn therapy.  -Colorectal surgery consulted.  Okay for clear liquid diet as tolerated.  Trialing conservative measures with IV fluids, IV antibiotics.  Serial abdominal exams.  -Continue IV Zosyn.  -Trend clinical course.  If patient fails to improve with conservative measures may need surgical intervention.     DVT Prophylaxis: Pneumatic Compression Devices  Code Status: Full Code  Dispo: Admit to inpatient    Eric Rios MD    Interval History   Patient seen.  Offered formal iPhone  but patient preferred to use his friend at bedside.    Patient notes he is feeling about the same.  He has  noted abdominal pain both in the left and right lower part of the belly.  No nausea or vomiting.  No fevers overnight.  No chest pain or shortness of breath.  He is asking if he can take a shower.    -Data reviewed today: I reviewed all new labs and imaging results over the last 24 hours. I personally reviewed     Physical Exam   Temp: 98  F (36.7  C) Temp src: Oral BP: 103/62 Pulse: 75   Resp: 17 SpO2: 98 % O2 Device: None (Room air)    Vitals:    01/07/24 1522   Weight: 90.6 kg (199 lb 11.8 oz)     Vital Signs with Ranges  Temp:  [98  F (36.7  C)-100  F (37.8  C)] 98  F (36.7  C)  Pulse:  [72-85] 75  Resp:  [16-18] 17  BP: (103-127)/() 103/62  SpO2:  [98 %-100 %] 98 %  No intake/output data recorded.    Constitutional: NAD, Nontoxic  HEENT: Normocephalic, MMM, no elevation of JVD noted  Respiratory: Nl WOB, Clear bilaterally, No wheezes, no crackles  Cardiovascular: Regular, no murmur  GI: Soft, bowel sounds present.  He does have tenderness in lower abdomen without rebound.   Lymph/Hematologic: No bruising. No cervical LAD  Skin: No rash  Musculoskeletal: Nl Tone, No edema noted  Neurologic: A&Ox3, Answers appropriately. CNII-XII intact. Moves all extremities. No tremor    Medications    lactated ringers 125 mL/hr at 01/08/24 0937      piperacillin-tazobactam  4.5 g Intravenous Q6H    sodium chloride (PF)  3 mL Intracatheter Q8H       Data   Recent Labs   Lab 01/08/24  0912 01/07/24  1543   WBC 16.2* 17.6*   HGB 13.2* 14.8   MCV 92 91    399    136   POTASSIUM 3.7 3.9   CHLORIDE 102 99   CO2 25 26   BUN 13.0 10.7   CR 0.85 0.82   ANIONGAP 10 11   SANTOS 8.4* 9.0   GLC 98 106*   ALBUMIN  --  4.2   PROTTOTAL  --  8.6*   BILITOTAL  --  0.7   ALKPHOS  --  67   ALT  --  27   AST  --  16   LIPASE  --  15       Recent Results (from the past 24 hour(s))   CT Abdomen Pelvis w Contrast    Narrative    EXAM: CT ABDOMEN PELVIS W CONTRAST  LOCATION: [facility]  DATE:  1/7/2024    INDICATION: llq pain, constipation, suspect sigmoid diveriticulits  COMPARISON: None.  TECHNIQUE: CT scan of the abdomen and pelvis was performed following injection of IV contrast. Multiplanar reformats were obtained. Dose reduction techniques were used.  CONTRAST: 100mL Isovue 370    FINDINGS:   LOWER CHEST: Lung bases are clear.    HEPATOBILIARY: Probable mild hepatic steatosis. No worrisome liver lesions. Normal gallbladder. No biliary ductal dilation.    PANCREAS: Normal.    SPLEEN: Normal.    ADRENAL GLANDS: Normal.    KIDNEYS/BLADDER: Normal.    BOWEL: There or findings of perforated sigmoid diverticulitis with extensive infiltration of the lower abdominal mesentery, trace pneumoperitoneum, and a 2.7 x 2.9 x 4.2 cm phlegmon adjacent to the sigmoid colon (3/154). No discrete/coalescing abscess or   drainable fluid collection.    Elsewhere bowel is normal in caliber. The appendix is normal.    LYMPH NODES: No lymphadenopathy.    VASCULATURE: Unremarkable.    PELVIC ORGANS: Normal contours.    MUSCULOSKELETAL: No acute abnormality.      Impression    IMPRESSION:   1.  Perforated sigmoid diverticulitis with trace pneumoperitoneum and small phlegmon adjacent to the sigmoid colon. No coalescing abscess or drainable collection.  2.  Additional details in the findings.

## 2024-01-08 NOTE — ED NOTES
[facility]  ED Nurse Handoff Report    ED Chief complaint: Abdominal Pain  . ED Diagnosis:   Final diagnoses:   Diverticulitis of colon with perforation       Allergies: No Known Allergies    Code Status: Full Code    Activity level - Baseline/Home:  standby.  Activity Level - Current:   assist of 1.   Lift room needed: No.   Bariatric: No   Needed: No   Isolation: No.   Infection: Not Applicable.     Respiratory status: Room air    Vital Signs (within 30 minutes):   Vitals:    01/07/24 1522   BP: (!) 125/118   Pulse: 72   Resp: 18   Temp: 99.7  F (37.6  C)   TempSrc: Oral   SpO2: 100%   Weight: 90.6 kg (199 lb 11.8 oz)       Cardiac Rhythm:  ,      Pain level:    Patient confused: No.   Patient Falls Risk: arm band in place.   Elimination Status: Has voided     Patient Report - Initial Complaint: Patient arrives with increased abdominal pain for the past week.   Focused Assessment: patient arrived to ED with increasing abdominal pain for the past 1.5 weeks. Patient was sent from . Patient denies any nausea or vomiting.      Abnormal Results:   Labs Ordered and Resulted from Time of ED Arrival to Time of ED Departure   ROUTINE UA WITH MICROSCOPIC REFLEX TO CULTURE - Abnormal       Result Value    Color Urine Yellow      Appearance Urine Clear      Glucose Urine Negative      Bilirubin Urine Negative      Ketones Urine 40 (*)     Specific Gravity Urine 1.029      Blood Urine Small (*)     pH Urine 6.0      Protein Albumin Urine 30 (*)     Urobilinogen Urine 2.0      Nitrite Urine Negative      Leukocyte Esterase Urine Negative      RBC Urine 7 (*)     WBC Urine 2      Squamous Epithelials Urine 1     COMPREHENSIVE METABOLIC PANEL - Abnormal    Sodium 136      Potassium 3.9      Carbon Dioxide (CO2) 26      Anion Gap 11      Urea Nitrogen 10.7      Creatinine 0.82      GFR Estimate >90      Calcium 9.0      Chloride 99      Glucose 106 (*)     Alkaline Phosphatase 67      AST 16       ALT 27      Protein Total 8.6 (*)     Albumin 4.2      Bilirubin Total 0.7     CBC WITH PLATELETS AND DIFFERENTIAL - Abnormal    WBC Count 17.6 (*)     RBC Count 4.73      Hemoglobin 14.8      Hematocrit 42.9      MCV 91      MCH 31.3      MCHC 34.5      RDW 12.0      Platelet Count 399      % Neutrophils 84      % Lymphocytes 9      % Monocytes 6      % Eosinophils 0      % Basophils 0      % Immature Granulocytes 1      NRBCs per 100 WBC 0      Absolute Neutrophils 14.8 (*)     Absolute Lymphocytes 1.6      Absolute Monocytes 1.0      Absolute Eosinophils 0.1      Absolute Basophils 0.0      Absolute Immature Granulocytes 0.1      Absolute NRBCs 0.0     LIPASE - Normal    Lipase 15          CT Abdomen Pelvis w Contrast   Final Result   IMPRESSION:    1.  Perforated sigmoid diverticulitis with trace pneumoperitoneum and small phlegmon adjacent to the sigmoid colon. No coalescing abscess or drainable collection.   2.  Additional details in the findings.          Treatments provided: Medications  Family Comments: Present  OBS brochure/video discussed/provided to patient:  N/A  ED Medications:   Medications   piperacillin-tazobactam (ZOSYN) 4.5 g vial to attach to  mL bag (4.5 g Intravenous $New Bag 1/7/24 1813)   ketorolac (TORADOL) injection 15 mg (15 mg Intravenous $Given 1/7/24 1547)   CT Scan Flush (65 mLs Intravenous $Given 1/7/24 1628)   iopamidol (ISOVUE-370) solution 500 mL (100 mLs Intravenous $Given 1/7/24 1628)       Drips infusing:  Yes  For the majority of the shift this patient was Green.   Interventions performed were None.    Sepsis treatment initiated: No    Cares/treatment/interventions/medications to be completed following ED care: None    ED Nurse Name: Rachael Golden RN  6:14 PM

## 2024-01-08 NOTE — PLAN OF CARE
[facility]    ED Boarding Nurse Handoff Addendum Report:    Date/time: 1/8/2024, 6:52 AM    Activity Level: independent    Fall Risk: No    Active Infusions: LR @ 125 mL     Current Meds Due: no    Current care needs: pain management, fluid maintenance    Oxygen requirements (liters/min and/or FiO2): no    Respiratory status: Room air    Vital signs (within last 30 minutes):    Vitals:    01/07/24 1522 01/07/24 2019 01/08/24 0613   BP: (!) 125/118 127/66 114/58   BP Location:   Right arm   Pulse: 72  75   Resp: 18  16   Temp: 99.7  F (37.6  C)  100  F (37.8  C)   TempSrc: Oral  Oral   SpO2: 100% 99%    Weight: 90.6 kg (199 lb 11.8 oz)         Focused assessment within last 30 minutes:    A&Ox4. VSS. Pt is Grenadian speaking only. Reports of 9/10 lower abd pain, PRN IV dilaudid given. Scheduled IV abx.      ED Boarding Nurse name: Jluis Silva RN

## 2024-01-08 NOTE — PLAN OF CARE
Goal Outcome Evaluation:      Plan of Care Reviewed With: patient    Overall Patient Progress: improving    Pt A&Ox4, PRN pain meds given x1. Pt explains that not a lot of pain while being still but increases exponentially when abd palpated or when pt has to move. Seen by colo-rectal. Tentatively medically manage for now but per MD notes, if medical management not effective may require surgical intervention. Pt transitioned to clears.

## 2024-01-08 NOTE — PHARMACY-ADMISSION MEDICATION HISTORY
Pharmacist Admission Medication History    Admission medication history is complete. The information provided in this note is only as accurate as the sources available at the time of the update.    Information Source(s): Patient via in-person    Pertinent Information: None    Changes made to PTA medication list:  Added: None  Deleted: None  Changed: None    Medication Affordability:       Allergies reviewed with patient and updates made in EHR: yes    Medication History Completed By: Eagle Leiva [facility] 1/7/2024 8:09 PM    No outpatient medications have been marked as taking for the 1/7/24 encounter (Hospital Encounter).

## 2024-01-09 ENCOUNTER — APPOINTMENT (OUTPATIENT)
Dept: INTERPRETER SERVICES | Facility: CLINIC | Age: 39
End: 2024-01-09

## 2024-01-09 LAB
ANION GAP SERPL CALCULATED.3IONS-SCNC: 10 MMOL/L (ref 7–15)
BUN SERPL-MCNC: 6.1 MG/DL (ref 6–20)
CALCIUM SERPL-MCNC: 8.8 MG/DL (ref 8.6–10)
CHLORIDE SERPL-SCNC: 99 MMOL/L (ref 98–107)
CREAT SERPL-MCNC: 0.75 MG/DL (ref 0.67–1.17)
DEPRECATED HCO3 PLAS-SCNC: 28 MMOL/L (ref 22–29)
EGFRCR SERPLBLD CKD-EPI 2021: >90 ML/MIN/1.73M2
ERYTHROCYTE [DISTWIDTH] IN BLOOD BY AUTOMATED COUNT: 11.9 % (ref 10–15)
GLUCOSE SERPL-MCNC: 122 MG/DL (ref 70–99)
HCT VFR BLD AUTO: 40.3 % (ref 40–53)
HGB BLD-MCNC: 13.6 G/DL (ref 13.3–17.7)
MCH RBC QN AUTO: 31.1 PG (ref 26.5–33)
MCHC RBC AUTO-ENTMCNC: 33.7 G/DL (ref 31.5–36.5)
MCV RBC AUTO: 92 FL (ref 78–100)
PLATELET # BLD AUTO: 385 10E3/UL (ref 150–450)
POTASSIUM SERPL-SCNC: 3.8 MMOL/L (ref 3.4–5.3)
RBC # BLD AUTO: 4.38 10E6/UL (ref 4.4–5.9)
SODIUM SERPL-SCNC: 137 MMOL/L (ref 135–145)
WBC # BLD AUTO: 14.9 10E3/UL (ref 4–11)

## 2024-01-09 PROCEDURE — 85027 COMPLETE CBC AUTOMATED: CPT | Performed by: HOSPITALIST

## 2024-01-09 PROCEDURE — 258N000003 HC RX IP 258 OP 636: Performed by: HOSPITALIST

## 2024-01-09 PROCEDURE — 36415 COLL VENOUS BLD VENIPUNCTURE: CPT | Performed by: HOSPITALIST

## 2024-01-09 PROCEDURE — 250N000013 HC RX MED GY IP 250 OP 250 PS 637: Performed by: HOSPITALIST

## 2024-01-09 PROCEDURE — 250N000011 HC RX IP 250 OP 636: Performed by: HOSPITALIST

## 2024-01-09 PROCEDURE — 120N000001 HC R&B MED SURG/OB

## 2024-01-09 PROCEDURE — 80048 BASIC METABOLIC PNL TOTAL CA: CPT | Performed by: HOSPITALIST

## 2024-01-09 PROCEDURE — 99232 SBSQ HOSP IP/OBS MODERATE 35: CPT | Performed by: HOSPITALIST

## 2024-01-09 RX ADMIN — OXYCODONE HYDROCHLORIDE 5 MG: 5 TABLET ORAL at 15:58

## 2024-01-09 RX ADMIN — OXYCODONE HYDROCHLORIDE 5 MG: 5 TABLET ORAL at 23:48

## 2024-01-09 RX ADMIN — SODIUM CHLORIDE, POTASSIUM CHLORIDE, SODIUM LACTATE AND CALCIUM CHLORIDE: 600; 310; 30; 20 INJECTION, SOLUTION INTRAVENOUS at 23:50

## 2024-01-09 RX ADMIN — PIPERACILLIN AND TAZOBACTAM 4.5 G: 4; .5 INJECTION, POWDER, FOR SOLUTION INTRAVENOUS at 00:03

## 2024-01-09 RX ADMIN — SODIUM CHLORIDE, POTASSIUM CHLORIDE, SODIUM LACTATE AND CALCIUM CHLORIDE: 600; 310; 30; 20 INJECTION, SOLUTION INTRAVENOUS at 00:03

## 2024-01-09 RX ADMIN — HYDROMORPHONE HYDROCHLORIDE 0.4 MG: 0.2 INJECTION, SOLUTION INTRAMUSCULAR; INTRAVENOUS; SUBCUTANEOUS at 00:03

## 2024-01-09 RX ADMIN — OXYCODONE HYDROCHLORIDE 5 MG: 5 TABLET ORAL at 10:17

## 2024-01-09 RX ADMIN — PIPERACILLIN AND TAZOBACTAM 4.5 G: 4; .5 INJECTION, POWDER, FOR SOLUTION INTRAVENOUS at 17:47

## 2024-01-09 RX ADMIN — OXYCODONE HYDROCHLORIDE 5 MG: 5 TABLET ORAL at 06:33

## 2024-01-09 RX ADMIN — PIPERACILLIN AND TAZOBACTAM 4.5 G: 4; .5 INJECTION, POWDER, FOR SOLUTION INTRAVENOUS at 23:50

## 2024-01-09 RX ADMIN — ACETAMINOPHEN 650 MG: 325 TABLET, FILM COATED ORAL at 19:53

## 2024-01-09 RX ADMIN — ACETAMINOPHEN 650 MG: 325 TABLET, FILM COATED ORAL at 23:48

## 2024-01-09 RX ADMIN — OXYCODONE HYDROCHLORIDE 5 MG: 5 TABLET ORAL at 19:53

## 2024-01-09 RX ADMIN — PIPERACILLIN AND TAZOBACTAM 4.5 G: 4; .5 INJECTION, POWDER, FOR SOLUTION INTRAVENOUS at 11:29

## 2024-01-09 RX ADMIN — ACETAMINOPHEN 650 MG: 325 TABLET, FILM COATED ORAL at 15:58

## 2024-01-09 RX ADMIN — PIPERACILLIN AND TAZOBACTAM 4.5 G: 4; .5 INJECTION, POWDER, FOR SOLUTION INTRAVENOUS at 06:09

## 2024-01-09 ASSESSMENT — ACTIVITIES OF DAILY LIVING (ADL)
ADLS_ACUITY_SCORE: 18

## 2024-01-09 NOTE — PLAN OF CARE
Care from [id number]    Inpatient Progress Note:  For complete assessment see flow sheet documentation.    /66 (BP Location: Left arm)   Pulse 73   Temp (!) 100.8  F (38.2  C) (Oral)   Resp 18   Wt 89.2 kg (196 lb 9.6 oz)   SpO2 98%       Orientation: A&OX4, Romanian speaking  Pain status: Moderate to severe pain on lower abdomen, PRN dilaudid and oxycodone given  Activity: Independent  Resp: WNL  Cardiac: WNL  GI: BS active, passing flatus, abdomen discomfort   : WNL  Skin: WNL  LDA: PIV  Infusions: LR @ 100ml/hr  Diet: Clear  Consults: Colorectal  Discharge Plan: Possible discharge 1/9    Will continue to monitor and provide cares.     Halley Ramires RN

## 2024-01-09 NOTE — PLAN OF CARE
PRIMARY DIAGNOSIS: ACUTE PAIN/diverticulitis  1. Pain Status: Improved but still requiring IV narcotics.    2. Return to near baseline physical activity: Yes    3. Cleared for discharge by consultants (if involved): No    Discharge Planner Nurse   Safe discharge environment identified: Yes  Barriers to discharge: No       Entered by: Shelley Murcia RN 01/08/2024 6:28 PM     Please review provider order for any additional goals.   Nurse to notify provider when observation goals have been met and patient is ready for discharge.

## 2024-01-09 NOTE — PROGRESS NOTES
COLON & RECTAL SURGERY  PROGRESS NOTE    January 9, 2024    SUBJECTIVE:  Doing okay. Abdominal pain about the same. 1 emesis yesterday, no nausea currently. Ambulating in room. Small loose BM without blood. Not passing gas. Low grade temp to 100.2F. Awaiting am labs.    OBJECTIVE:  Temp:  [99.1  F (37.3  C)-100.8  F (38.2  C)] 100.2  F (37.9  C)  Pulse:  [71-73] 71  Resp:  [16-18] 18  BP: (122-155)/(63-98) 124/63  SpO2:  [97 %-98 %] 97 %  No intake or output data in the 24 hours ending 01/09/24 1035    GENERAL:  Awake, alert, no acute distress,   HEAD: Nomocephalic atraumatic  SCLERA: anicteric  EXTREMITIES: warm and well perfused  ABDOMEN:  Soft, tender in the left lower quadrant, non-distended, no rebound or guarding, no peritoneal signs      LABS:  Lab Results   Component Value Date    WBC 16.2 01/08/2024     Lab Results   Component Value Date    HGB 13.2 01/08/2024     Lab Results   Component Value Date    HCT 38.7 01/08/2024     Lab Results   Component Value Date     01/08/2024     Last Basic Metabolic Panel:  Lab Results   Component Value Date     01/08/2024      Lab Results   Component Value Date    POTASSIUM 3.7 01/08/2024     Lab Results   Component Value Date    CHLORIDE 102 01/08/2024     Lab Results   Component Value Date    SANTOS 8.4 01/08/2024     Lab Results   Component Value Date    CO2 25 01/08/2024     Lab Results   Component Value Date    BUN 13.0 01/08/2024     Lab Results   Component Value Date    CR 0.85 01/08/2024     Lab Results   Component Value Date    GLC 98 01/08/2024       ASSESSMENT/PLAN: Marko Pastor is a 38 year old male admitted on 1/7/24 with perforated diverticulitis.     -Clears as tolerated  -Awaiting AM labs  -Continue IV antibiotics  -Continue IVFs  -Pain management, minimize narcotics  -No plans for emergent surgery, but may need to consider if he fails to improve with conservative management.      For questions/paging, please contact the [facility] office at  [phone].    Zelalem Wilde PA-C  [facility]  Phone: [phone]     .Colon and Rectal Surgery Attending Note    Patient seen and examined independently.  Agree with above assessment and plan.  Up walking  Abd soft with focal tenderness  Plan as above.   May need repeat imaging in 2-3 days if fails to improve or worsens.     Sandy Dale MD  [facility]  [id number] [place] [address], [address]  [place] [address]  T: [phone]  F: [phone]   [email or web address]

## 2024-01-09 NOTE — PROGRESS NOTES
[facility]    Hospitalist Progress Note      Assessment & Plan   Marko Pastor is a 38 year old male without any significant past medical history who presents with abdominal pain.     # Perforated sigmoid diverticulitis with trace pneumoperitoneum and small phlegmon: Patient notes that he has had left lower quadrant abdominal pain that started about 1.5 weeks ago.  He notes that it has been worse when he has a bowel movement or when he moves suddenly.  He did have 1 episode of nausea with nonbloody emesis yesterday.  Denies any loose or watery stools.  No blood in the stool.  He notes that his abdominal pain worsened this morning so went to urgent care for presentation.  He denies any prior hospitalizations.  No prior colonoscopies.  He is not taking any prescription medications.  He works as a  and is on his feet for most of the day.  -ED, patient with low-grade fever of 99.7, nontachycardic and normotensive.  Saturating okay on room air.  CBC with leukocytosis to 17.6.  BMP unremarkable.  LFTs within normal limits.  Lipase within normal limits.  UA unremarkable.  CT abdomen pelvis done that showed a perforated sigmoid diverticulitis with trace pneumoperitoneum and small phlegmon adjacent to the sigmoid colon with no coalescing abscess or drainable collection.  Patient given Zosyn therapy.  -Colorectal surgery consulted.  Okay for clear liquid diet as tolerated.  Trialing conservative measures with IV fluids, IV antibiotics.  Serial abdominal exams.  -Pt did have fever on AM of 1/9.  Pain is about the same in lower abdomen.  No n/v.  He states had BM yesterday. No blood noted.   -Continue IV Zosyn.  -Trend clinical course.  If patient fails to improve with conservative measures may need surgical intervention.  Awaiting AM labs to be collected on 1/9.       DVT Prophylaxis: Pneumatic Compression Devices  Code Status: Full Code  Dispo: Admit to inpatient    Eric Rios  MD    Interval History   Seen with iphone interpretor    Pt notes he feels a bit better today. Still with lower abdominal pain.  Tolerated clear. Had BM yesterday without blood noted.  No N/V.     -Data reviewed today: I reviewed all new labs and imaging results over the last 24 hours. I personally reviewed     Physical Exam   Temp: 100.2  F (37.9  C) Temp src: Oral BP: 124/63 Pulse: 71   Resp: 18 SpO2: 97 % O2 Device: None (Room air)    Vitals:    01/07/24 1522 01/09/24 0616   Weight: 90.6 kg (199 lb 11.8 oz) 89.2 kg (196 lb 9.6 oz)     Vital Signs with Ranges  Temp:  [99.1  F (37.3  C)-100.8  F (38.2  C)] 100.2  F (37.9  C)  Pulse:  [71-73] 71  Resp:  [16-18] 18  BP: (122-155)/(63-98) 124/63  SpO2:  [97 %-98 %] 97 %  No intake/output data recorded.    Constitutional: NAD, Nontoxic  HEENT: Normocephalic, MMM, no elevation of JVD noted  Respiratory: Nl WOB, Clear bilaterally, No wheezes, no crackles  Cardiovascular: Regular, no murmur  GI: Soft, bowel sounds present.  He does have tenderness in lower abdomen without rebound.   Lymph/Hematologic: No bruising. No cervical LAD  Skin: No rash  Musculoskeletal: Nl Tone, No edema noted  Neurologic: A&Ox3, Answers appropriately. CNII-XII intact. Moves all extremities. No tremor    Medications    lactated ringers Stopped (01/09/24 0932)      piperacillin-tazobactam  4.5 g Intravenous Q6H    sodium chloride (PF)  3 mL Intracatheter Q8H       Data   Recent Labs   Lab 01/08/24  0912 01/07/24  1543   WBC 16.2* 17.6*   HGB 13.2* 14.8   MCV 92 91    399    136   POTASSIUM 3.7 3.9   CHLORIDE 102 99   CO2 25 26   BUN 13.0 10.7   CR 0.85 0.82   ANIONGAP 10 11   SANTOS 8.4* 9.0   GLC 98 106*   ALBUMIN  --  4.2   PROTTOTAL  --  8.6*   BILITOTAL  --  0.7   ALKPHOS  --  67   ALT  --  27   AST  --  16   LIPASE  --  15       No results found for this or any previous visit (from the past 24 hour(s)).

## 2024-01-10 LAB
ANION GAP SERPL CALCULATED.3IONS-SCNC: 11 MMOL/L (ref 7–15)
BUN SERPL-MCNC: 6.1 MG/DL (ref 6–20)
CALCIUM SERPL-MCNC: 8.3 MG/DL (ref 8.6–10)
CHLORIDE SERPL-SCNC: 100 MMOL/L (ref 98–107)
CREAT SERPL-MCNC: 0.65 MG/DL (ref 0.67–1.17)
DEPRECATED HCO3 PLAS-SCNC: 25 MMOL/L (ref 22–29)
EGFRCR SERPLBLD CKD-EPI 2021: >90 ML/MIN/1.73M2
ERYTHROCYTE [DISTWIDTH] IN BLOOD BY AUTOMATED COUNT: 12.1 % (ref 10–15)
GLUCOSE SERPL-MCNC: 92 MG/DL (ref 70–99)
HCT VFR BLD AUTO: 38.7 % (ref 40–53)
HGB BLD-MCNC: 13.1 G/DL (ref 13.3–17.7)
MCH RBC QN AUTO: 31.4 PG (ref 26.5–33)
MCHC RBC AUTO-ENTMCNC: 33.9 G/DL (ref 31.5–36.5)
MCV RBC AUTO: 93 FL (ref 78–100)
PLATELET # BLD AUTO: 379 10E3/UL (ref 150–450)
POTASSIUM SERPL-SCNC: 3.7 MMOL/L (ref 3.4–5.3)
RBC # BLD AUTO: 4.17 10E6/UL (ref 4.4–5.9)
SODIUM SERPL-SCNC: 136 MMOL/L (ref 135–145)
WBC # BLD AUTO: 12.6 10E3/UL (ref 4–11)

## 2024-01-10 PROCEDURE — 85014 HEMATOCRIT: CPT | Performed by: HOSPITALIST

## 2024-01-10 PROCEDURE — 250N000011 HC RX IP 250 OP 636: Performed by: HOSPITALIST

## 2024-01-10 PROCEDURE — 250N000013 HC RX MED GY IP 250 OP 250 PS 637: Performed by: HOSPITALIST

## 2024-01-10 PROCEDURE — 36415 COLL VENOUS BLD VENIPUNCTURE: CPT | Performed by: HOSPITALIST

## 2024-01-10 PROCEDURE — 80048 BASIC METABOLIC PNL TOTAL CA: CPT | Performed by: HOSPITALIST

## 2024-01-10 PROCEDURE — 120N000001 HC R&B MED SURG/OB

## 2024-01-10 PROCEDURE — 258N000003 HC RX IP 258 OP 636: Performed by: HOSPITALIST

## 2024-01-10 PROCEDURE — 99232 SBSQ HOSP IP/OBS MODERATE 35: CPT | Performed by: HOSPITALIST

## 2024-01-10 RX ADMIN — ACETAMINOPHEN 650 MG: 325 TABLET, FILM COATED ORAL at 10:09

## 2024-01-10 RX ADMIN — ACETAMINOPHEN 650 MG: 325 TABLET, FILM COATED ORAL at 17:10

## 2024-01-10 RX ADMIN — OXYCODONE HYDROCHLORIDE 5 MG: 5 TABLET ORAL at 17:10

## 2024-01-10 RX ADMIN — OXYCODONE HYDROCHLORIDE 5 MG: 5 TABLET ORAL at 10:09

## 2024-01-10 RX ADMIN — OXYCODONE HYDROCHLORIDE 5 MG: 5 TABLET ORAL at 05:18

## 2024-01-10 RX ADMIN — SODIUM CHLORIDE, POTASSIUM CHLORIDE, SODIUM LACTATE AND CALCIUM CHLORIDE: 600; 310; 30; 20 INJECTION, SOLUTION INTRAVENOUS at 16:46

## 2024-01-10 RX ADMIN — PIPERACILLIN AND TAZOBACTAM 4.5 G: 4; .5 INJECTION, POWDER, FOR SOLUTION INTRAVENOUS at 23:28

## 2024-01-10 RX ADMIN — ACETAMINOPHEN 650 MG: 325 TABLET, FILM COATED ORAL at 05:17

## 2024-01-10 RX ADMIN — PIPERACILLIN AND TAZOBACTAM 4.5 G: 4; .5 INJECTION, POWDER, FOR SOLUTION INTRAVENOUS at 05:17

## 2024-01-10 RX ADMIN — PIPERACILLIN AND TAZOBACTAM 4.5 G: 4; .5 INJECTION, POWDER, FOR SOLUTION INTRAVENOUS at 12:13

## 2024-01-10 RX ADMIN — PIPERACILLIN AND TAZOBACTAM 4.5 G: 4; .5 INJECTION, POWDER, FOR SOLUTION INTRAVENOUS at 17:10

## 2024-01-10 ASSESSMENT — ACTIVITIES OF DAILY LIVING (ADL)
ADLS_ACUITY_SCORE: 18

## 2024-01-10 NOTE — PLAN OF CARE
"  Problem: Adult Inpatient Plan of Care  Goal: Plan of Care Review  Description: The Plan of Care Review/Shift note should be completed every shift.  The Outcome Evaluation is a brief statement about your assessment that the patient is improving, declining, or no change.  This information will be displayed automatically on your shift  note.  Outcome: Adequate for Care Transition  Flowsheets (Taken 1/9/2024 5253)  Plan of Care Reviewed With:   patient   family  Overall Patient Progress: improving  Goal: Patient-Specific Goal (Individualized)  Description: You can add care plan individualizations to a care plan. Examples of Individualization might be:  \"Parent requests to be called daily at 9am for status\", \"I have a hard time hearing out of my right ear\", or \"Do not touch me to wake me up as it startles  me\".  Outcome: Adequate for Care Transition  Goal: Absence of Hospital-Acquired Illness or Injury  Outcome: Adequate for Care Transition  Intervention: Identify and Manage Fall Risk  Recent Flowsheet Documentation  Taken 1/9/2024 1649 by Carla Suarez RN  Safety Promotion/Fall Prevention: safety round/check completed  Taken 1/9/2024 1227 by Carla Suarez RN  Safety Promotion/Fall Prevention: safety round/check completed  Intervention: Prevent Infection  Recent Flowsheet Documentation  Taken 1/9/2024 1649 by Carla Suarez RN  Infection Prevention: single patient room provided  Taken 1/9/2024 1227 by Carla Suarez RN  Infection Prevention: single patient room provided  Goal: Optimal Comfort and Wellbeing  Outcome: Adequate for Care Transition  Goal: Readiness for Transition of Care  Outcome: Adequate for Care Transition       Goal Outcome Evaluation:      Plan of Care Reviewed With: patient, family    Overall Patient Progress: improvingOverall Patient Progress: improving     Temp: 98.2  F (36.8  C) Temp src: Oral BP: 124/63 Pulse: 71   Resp: 18 SpO2: 97 % O2 Device: None (Room " air)       Orientation:  A&O x4  VS: VSS  Pain:  Pain present. PRN given. Effective per pt  Activity: Independent  Resp:  RA. Denies SOB  GI:  Denies nausea and vomiting  : Voiding without difficulty  Skin:  WDL  Lines: PIV Infusing  Diet: Clear Liquid  Plan: Colorectal following.  Discharge:  Pending

## 2024-01-10 NOTE — PROGRESS NOTES
[facility]    Hospitalist Progress Note      Assessment & Plan   Marko Pastor is a 38 year old male without any significant past medical history who presents with abdominal pain.     # Perforated sigmoid diverticulitis with trace pneumoperitoneum and small phlegmon: Patient notes that he has had left lower quadrant abdominal pain that started about 1.5 weeks ago.  He notes that it has been worse when he has a bowel movement or when he moves suddenly.  He did have 1 episode of nausea with nonbloody emesis yesterday.  Denies any loose or watery stools.  No blood in the stool.  He notes that his abdominal pain worsened this morning so went to urgent care for presentation.  He denies any prior hospitalizations.  No prior colonoscopies.  He is not taking any prescription medications.  He works as a  and is on his feet for most of the day.  -ED, patient with low-grade fever of 99.7, nontachycardic and normotensive.  Saturating okay on room air.  CBC with leukocytosis to 17.6.  BMP unremarkable.  LFTs within normal limits.  Lipase within normal limits.  UA unremarkable.  CT abdomen pelvis done that showed a perforated sigmoid diverticulitis with trace pneumoperitoneum and small phlegmon adjacent to the sigmoid colon with no coalescing abscess or drainable collection.  Patient given Zosyn therapy.  -Colorectal surgery consulted. Trialing conservative measures with IV fluids, IV antibiotics.  Serial abdominal exams.  -Advance to full liquids on 1/10.   -No fevers in last 24 hours.  Feels a bit better. Still with RLQ abdominal pain.  Rated 7/10 on 1/10.    -Continue IV Zosyn.  -Trend clinical course.  If patient fails to improve with conservative measures may need surgical intervention.  Repeating CT scan either tomorrow or Friday per colorectal.        DVT Prophylaxis: Pneumatic Compression Devices  Code Status: Full Code  Dispo: Pending repeat CT scan tomorrow or Friday.     Eric  MD Gabriel    Interval History   Seen with iphone .    Pain rated 7/10. Tolerating liquids. Passing gas and had BM. No n/v. No fevers/chills. No CP, SOB.     -Data reviewed today: I reviewed all new labs and imaging results over the last 24 hours. I personally reviewed     Physical Exam   Temp: 98.6  F (37  C) Temp src: Oral BP: 117/63 Pulse: 55   Resp: 18   O2 Device: None (Room air)    Vitals:    01/07/24 1522 01/09/24 0616   Weight: 90.6 kg (199 lb 11.8 oz) 89.2 kg (196 lb 9.6 oz)     Vital Signs with Ranges  Temp:  [97.6  F (36.4  C)-98.6  F (37  C)] 98.6  F (37  C)  Pulse:  [55] 55  Resp:  [18] 18  BP: (106-117)/(61-63) 117/63  I/O last 3 completed shifts:  In: 320 [P.O.:320]  Out: -     Constitutional: NAD, Nontoxic  HEENT: Normocephalic, MMM, no elevation of JVD noted  Respiratory: Nl WOB, Clear bilaterally, No wheezes, no crackles  Cardiovascular: Regular, no murmur  GI: Soft, bowel sounds present.  He does have tenderness in lower abdomen without rebound.   Lymph/Hematologic: No bruising. No cervical LAD  Skin: No rash  Musculoskeletal: Nl Tone, No edema noted  Neurologic: A&Ox3, Answers appropriately. CNII-XII intact. Moves all extremities. No tremor    Medications    lactated ringers 75 mL/hr at 01/09/24 2350      piperacillin-tazobactam  4.5 g Intravenous Q6H    sodium chloride (PF)  3 mL Intracatheter Q8H       Data   Recent Labs   Lab 01/10/24  0722 01/09/24  1052 01/08/24  0912 01/07/24  1543   WBC 12.6* 14.9* 16.2* 17.6*   HGB 13.1* 13.6 13.2* 14.8   MCV 93 92 92 91    385 363 399    137 137 136   POTASSIUM 3.7 3.8 3.7 3.9   CHLORIDE 100 99 102 99   CO2 25 28 25 26   BUN 6.1 6.1 13.0 10.7   CR 0.65* 0.75 0.85 0.82   ANIONGAP 11 10 10 11   SANTOS 8.3* 8.8 8.4* 9.0   GLC 92 122* 98 106*   ALBUMIN  --   --   --  4.2   PROTTOTAL  --   --   --  8.6*   BILITOTAL  --   --   --  0.7   ALKPHOS  --   --   --  67   ALT  --   --   --  27   AST  --   --   --  16   LIPASE  --   --   --  15        No results found for this or any previous visit (from the past 24 hour(s)).

## 2024-01-10 NOTE — PLAN OF CARE
Care from 4132-0064    Inpatient Progress Note:  For complete assessment see flow sheet documentation.    /61 (BP Location: Left arm)   Pulse 55   Temp 97.6  F (36.4  C) (Oral)   Resp 18   Wt 89.2 kg (196 lb 9.6 oz)   SpO2 97%       Orientation: A&OX4, Serbian speaking  Pain status: Moderate to severe abdominal pain  Activity: Independent  Resp: WNL  Cardiac: WNL  GI: BS active, Passing flatus, abdominal pain   : WNL   LDA: PIV  Infusions: LR @ 75ml/hr   Diet: Clear  Consults: Colorectal  Discharge Plan: TBD    Will continue to monitor and provide cares.     Halley Ramires RN

## 2024-01-10 NOTE — PROGRESS NOTES
COLON & RECTAL SURGERY  PROGRESS NOTE    January 10, 2024    SUBJECTIVE:  Abdomen still sore. Tolerating clears. Passing small amount of gas, BM this am. WBC 12.6 from 14.9. AVSS.    OBJECTIVE:  Temp:  [97.6  F (36.4  C)-98.6  F (37  C)] 98.6  F (37  C)  Pulse:  [55] 55  Resp:  [18] 18  BP: (106-117)/(61-63) 117/63    Intake/Output Summary (Last 24 hours) at 1/10/2024 0842  Last data filed at 1/9/2024 1100  Gross per 24 hour   Intake 320 ml   Output --   Net 320 ml       GENERAL:  Awake, alert, no acute distress, lying in bed  HEAD: Nomocephalic atraumatic  SCLERA: anicteric  EXTREMITIES: warm and well perfused  ABDOMEN:  Soft, tender in left lower abdomen, non-distended, no rebound or guarding, no peritoneal signs      LABS:  Lab Results   Component Value Date    WBC 12.6 01/10/2024     Lab Results   Component Value Date    HGB 13.1 01/10/2024     Lab Results   Component Value Date    HCT 38.7 01/10/2024     Lab Results   Component Value Date     01/10/2024     Last Basic Metabolic Panel:  Lab Results   Component Value Date     01/10/2024      Lab Results   Component Value Date    POTASSIUM 3.7 01/10/2024     Lab Results   Component Value Date    CHLORIDE 100 01/10/2024     Lab Results   Component Value Date    SANTOS 8.3 01/10/2024     Lab Results   Component Value Date    CO2 25 01/10/2024     Lab Results   Component Value Date    BUN 6.1 01/10/2024     Lab Results   Component Value Date    CR 0.65 01/10/2024     Lab Results   Component Value Date    GLC 92 01/10/2024       ASSESSMENT/PLAN: Marko Pastor is a 38 year old male admitted on 1/7/24 with perforated diverticulitis.      -Advance to full liquids  -Continue IV antibiotics  -Continue IVFs  -Pain management, minimize narcotics  -Possible CT tomorrow or Friday  -No plans for surgery but may need to consider if he does not improve with conservative management.       For questions/paging, please contact the CRS office at  [phone].    Zelalem Wilde PA-C  [facility]  Phone: [phone]     Colon and Rectal Surgery Attending Note    Patient seen and examined independently.  Agree with above assessment and plan.  Patient seen and examined with the aid of a phone .  Feeling better today.  White count is improved slightly.  Continues to be sore.  Tolerating clears.  Having small bowel movement and gas.  Abdomen soft with focal left lower quadrant tenderness.  No rebound or guarding.    Plan: Continue IV antibiotics and advance to full liquids.  May need to repeat CT scan if white count remains elevated given the anterior mesenteric air that may be an impending abscess.  No plans for surgery at this time.    Sandy Dale MD  [facility]  [address], [address]  [place] [address]  T: [phone]  F: [phone]   [email or web address]

## 2024-01-11 ENCOUNTER — APPOINTMENT (OUTPATIENT)
Dept: INTERPRETER SERVICES | Facility: CLINIC | Age: 39
End: 2024-01-11

## 2024-01-11 LAB
ANION GAP SERPL CALCULATED.3IONS-SCNC: 11 MMOL/L (ref 7–15)
BUN SERPL-MCNC: 6.2 MG/DL (ref 6–20)
CALCIUM SERPL-MCNC: 8.9 MG/DL (ref 8.6–10)
CHLORIDE SERPL-SCNC: 103 MMOL/L (ref 98–107)
CREAT SERPL-MCNC: 0.76 MG/DL (ref 0.67–1.17)
DEPRECATED HCO3 PLAS-SCNC: 26 MMOL/L (ref 22–29)
EGFRCR SERPLBLD CKD-EPI 2021: >90 ML/MIN/1.73M2
ERYTHROCYTE [DISTWIDTH] IN BLOOD BY AUTOMATED COUNT: 12.1 % (ref 10–15)
GLUCOSE SERPL-MCNC: 89 MG/DL (ref 70–99)
HCT VFR BLD AUTO: 41.4 % (ref 40–53)
HGB BLD-MCNC: 14 G/DL (ref 13.3–17.7)
MCH RBC QN AUTO: 31 PG (ref 26.5–33)
MCHC RBC AUTO-ENTMCNC: 33.8 G/DL (ref 31.5–36.5)
MCV RBC AUTO: 92 FL (ref 78–100)
PLATELET # BLD AUTO: 416 10E3/UL (ref 150–450)
POTASSIUM SERPL-SCNC: 4.1 MMOL/L (ref 3.4–5.3)
RBC # BLD AUTO: 4.52 10E6/UL (ref 4.4–5.9)
SODIUM SERPL-SCNC: 140 MMOL/L (ref 135–145)
WBC # BLD AUTO: 9.9 10E3/UL (ref 4–11)

## 2024-01-11 PROCEDURE — 80048 BASIC METABOLIC PNL TOTAL CA: CPT | Performed by: HOSPITALIST

## 2024-01-11 PROCEDURE — 99232 SBSQ HOSP IP/OBS MODERATE 35: CPT | Performed by: HOSPITALIST

## 2024-01-11 PROCEDURE — 36415 COLL VENOUS BLD VENIPUNCTURE: CPT | Performed by: HOSPITALIST

## 2024-01-11 PROCEDURE — 250N000011 HC RX IP 250 OP 636: Performed by: HOSPITALIST

## 2024-01-11 PROCEDURE — 85027 COMPLETE CBC AUTOMATED: CPT | Performed by: HOSPITALIST

## 2024-01-11 PROCEDURE — 250N000013 HC RX MED GY IP 250 OP 250 PS 637: Performed by: HOSPITALIST

## 2024-01-11 PROCEDURE — 120N000001 HC R&B MED SURG/OB

## 2024-01-11 PROCEDURE — 258N000003 HC RX IP 258 OP 636: Performed by: HOSPITALIST

## 2024-01-11 RX ADMIN — PIPERACILLIN AND TAZOBACTAM 4.5 G: 4; .5 INJECTION, POWDER, FOR SOLUTION INTRAVENOUS at 11:53

## 2024-01-11 RX ADMIN — ACETAMINOPHEN 650 MG: 325 TABLET, FILM COATED ORAL at 10:26

## 2024-01-11 RX ADMIN — PIPERACILLIN AND TAZOBACTAM 4.5 G: 4; .5 INJECTION, POWDER, FOR SOLUTION INTRAVENOUS at 17:18

## 2024-01-11 RX ADMIN — ACETAMINOPHEN 650 MG: 325 TABLET, FILM COATED ORAL at 03:01

## 2024-01-11 RX ADMIN — SODIUM CHLORIDE, POTASSIUM CHLORIDE, SODIUM LACTATE AND CALCIUM CHLORIDE: 600; 310; 30; 20 INJECTION, SOLUTION INTRAVENOUS at 10:26

## 2024-01-11 RX ADMIN — OXYCODONE HYDROCHLORIDE 5 MG: 5 TABLET ORAL at 17:33

## 2024-01-11 RX ADMIN — OXYCODONE HYDROCHLORIDE 5 MG: 5 TABLET ORAL at 05:25

## 2024-01-11 RX ADMIN — OXYCODONE HYDROCHLORIDE 5 MG: 5 TABLET ORAL at 00:52

## 2024-01-11 RX ADMIN — ACETAMINOPHEN 650 MG: 325 TABLET, FILM COATED ORAL at 17:34

## 2024-01-11 RX ADMIN — OXYCODONE HYDROCHLORIDE 5 MG: 5 TABLET ORAL at 10:26

## 2024-01-11 RX ADMIN — PIPERACILLIN AND TAZOBACTAM 4.5 G: 4; .5 INJECTION, POWDER, FOR SOLUTION INTRAVENOUS at 05:25

## 2024-01-11 ASSESSMENT — ACTIVITIES OF DAILY LIVING (ADL)
ADLS_ACUITY_SCORE: 18

## 2024-01-11 NOTE — PLAN OF CARE
Goal Outcome Evaluation:      Plan of Care Reviewed With: patient        Swedish speaking, knows no english  Vitals: /66 (BP Location: Right arm)   Pulse 61   Temp 98.9  F (37.2  C) (Oral)   Resp 18   Wt 89.2 kg (196 lb 9.6 oz)   SpO2 97%   Cardiac: WNL  Respiratory: WNL  Neuro: A/Ox4  GI/: abdominal discomfort; no N/V, passing gas, BM today   Skin: WNL  LDAs: PIV running LR @ 75  Diet: Full liquid   Activity: Ind  Pain: 7/10 lower abdominal pain, prn oxy and tylenol given   Plan: IVF, IV abx, pain control, encourage ambulation

## 2024-01-11 NOTE — PROGRESS NOTES
Patient is Alert and Oriented x4. VSS. Complaining of abdominal pain rated at 7. PRN Oxycodone given x 2 and prn Tylenol given x 1. Pt reports that the pain medications are not effective.   Patient has Lactated Ringer's running at 75 mL per hour. Pt is on Full liquid diet. He is independent in the room. Pt continues on Zosyn.

## 2024-01-11 NOTE — PROGRESS NOTES
[facility]    Medicine Progress Note - Hospitalist Service    Date of Admission:  1/7/2024    Assessment & Plan    Marko Pastor is a 38 year old male without any significant past medical history who presents with abdominal pain.     Perforated sigmoid diverticulitis w/trace pneumoperitoneum and small phlegmon  -Patient notes that he has had left lower quadrant abdominal pain that started about 1.5 weeks ago.  He notes that it has been worse when he has a bowel movement or when he moves suddenly.  He did have 1 episode of nausea with nonbloody emesis yesterday.  Denies any loose or watery stools.  No blood in the stool.  He notes that his abdominal pain worsened this morning so went to urgent care for presentation.  He denies any prior hospitalizations.  No prior colonoscopies.  He is not taking any prescription medications.  He works as a  and is on his feet for most of the day.  -ED, patient with low-grade fever of 99.7, nontachycardic and normotensive.  Saturating okay on room air.  CBC with leukocytosis to 17.6.  BMP unremarkable.  LFTs within normal limits.  Lipase within normal limits.  UA unremarkable.  CT abdomen pelvis done that showed a perforated sigmoid diverticulitis with trace pneumoperitoneum and small phlegmon adjacent to the sigmoid colon with no coalescing abscess or drainable collection.  Patient given Zosyn therapy.  -Colorectal surgery consulted. Trialing conservative measures with IV fluids, IV antibiotics.  Serial abdominal exams.  -Advanced to full liquids on 1/10 and tolerating well so far  -defer repeat imaging to CRS team      Diet: Full Liquid Diet    DVT Prophylaxis: Pneumatic Compression Devices  Collins Catheter: Not present  Lines: None     Cardiac Monitoring: None  Code Status: Full Code         Disposition Plan   Await further improvement and CRS recs, possible discharge next 24-48 hours         Leroy Rivera, DO  Hospitalist Service  [facility]  [facility]  Securely message with Anthony (more info)  Text page via [facility] Paging/Directory   ______________________________________________________________________    Interval History    line used for interview, pain is pretty well controlled. No nausea or vomiting, passing gas. Only on full liquids but feels better    Physical Exam   Vital Signs: Temp: 97.7  F (36.5  C) Temp src: Oral BP: 129/69 Pulse: 53   Resp: 18 SpO2: 99 % O2 Device: None (Room air)    Weight: 196 lbs 9.6 oz    Constitutional: awake, alert, and cooperative  Eyes: pupils equal, round and reactive to light and conjunctiva normal  ENT: normocepalic, without obvious abnormality, atramatic  Respiratory: no increased work of breathing, good air exchange, and clear to auscultation  Cardiovascular: regular rate and rhythm, no murmur noted, and no edema  GI: active bowel sounds, mild pain to palpation, soft  Skin: no bruising or bleeding  Neurologic: alert, moving all extremities    45 MINUTES SPENT BY ME on the date of service doing chart review, history, exam, documentation & further activities per the note.      Data   ------------------------- PAST 24 HR DATA REVIEWED -----------------------------------------------    I have personally reviewed the following data over the past 24 hrs:    9.9  \   14.0   / 416     140 103 6.2 /  89   4.1 26 0.76 \       Imaging results reviewed over the past 24 hrs:   No results found for this or any previous visit (from the past 24 hour(s)).

## 2024-01-11 NOTE — PROGRESS NOTES
COLON & RECTAL SURGERY  PROGRESS NOTE    January 11, 2024    SUBJECTIVE:  Feeling a bit better today. Pain improving. Tolerating fulls, no N/V. Last BM around midnight. WBC 9.9 from 12.6. AVSS.    OBJECTIVE:  Temp:  [97.7  F (36.5  C)-98.9  F (37.2  C)] 97.7  F (36.5  C)  Pulse:  [53-64] 53  Resp:  [18] 18  BP: (120-129)/(66-72) 129/69  SpO2:  [98 %-99 %] 99 %    Intake/Output Summary (Last 24 hours) at 1/11/2024 0839  Last data filed at 1/10/2024 1100  Gross per 24 hour   Intake 300 ml   Output --   Net 300 ml       GENERAL:  Awake, alert, no acute distress, lying in bed  HEAD: Nomocephalic atraumatic  SCLERA: anicteric  EXTREMITIES: warm and well perfused  ABDOMEN:  Soft, focal left lower quadrant tenderness, non-distended, no rebound or guarding, no peritoneal signs      LABS:  Lab Results   Component Value Date    WBC 9.9 01/11/2024     Lab Results   Component Value Date    HGB 14.0 01/11/2024     Lab Results   Component Value Date    HCT 41.4 01/11/2024     Lab Results   Component Value Date     01/11/2024     Last Basic Metabolic Panel:  Lab Results   Component Value Date     01/11/2024      Lab Results   Component Value Date    POTASSIUM 4.1 01/11/2024     Lab Results   Component Value Date    CHLORIDE 103 01/11/2024     Lab Results   Component Value Date    SANTOS 8.9 01/11/2024     Lab Results   Component Value Date    CO2 26 01/11/2024     Lab Results   Component Value Date    BUN 6.2 01/11/2024     Lab Results   Component Value Date    CR 0.76 01/11/2024     Lab Results   Component Value Date    GLC 89 01/11/2024       ASSESSMENT/PLAN: Marko Pastor is a 38 year old male admitted on 1/7/24 with perforated diverticulitis.      -Full liquids  -Continue IV antibiotics  -Pain management, minimize narcotics  -Encourage ambulation  -No plans for surgery at this time       Patient seen and examined with the aid of a phone .     For questions/paging, please contact the CRS office at  [phone].    Zelalem Wilde PA-C  [facility]  Phone: [phone]

## 2024-01-11 NOTE — PLAN OF CARE
Orientation:!&Ox4  Vss afebrile.  LS:clear  GI:bs+, C/o abd pain 6/10, tylenol/oxy given x1 with relief.  : voiding with out problems  Skin: intact  Activity:  up ad jim in room and hallways  Pain: c/o lower abd pain 6/10, prn meds given with relief.  Plan: ivf, iv abx, repeat Ct possible per yesterdays note. Tolerating full liq diet.         Plan of Care Reviewed With: patient

## 2024-01-12 ENCOUNTER — APPOINTMENT (OUTPATIENT)
Dept: CT IMAGING | Facility: CLINIC | Age: 39
DRG: 391 | End: 2024-01-12
Attending: PHYSICIAN ASSISTANT

## 2024-01-12 LAB
ANION GAP SERPL CALCULATED.3IONS-SCNC: 11 MMOL/L (ref 7–15)
BUN SERPL-MCNC: 7.3 MG/DL (ref 6–20)
CALCIUM SERPL-MCNC: 9.4 MG/DL (ref 8.6–10)
CHLORIDE SERPL-SCNC: 104 MMOL/L (ref 98–107)
CREAT SERPL-MCNC: 0.77 MG/DL (ref 0.67–1.17)
DEPRECATED HCO3 PLAS-SCNC: 24 MMOL/L (ref 22–29)
EGFRCR SERPLBLD CKD-EPI 2021: >90 ML/MIN/1.73M2
ERYTHROCYTE [DISTWIDTH] IN BLOOD BY AUTOMATED COUNT: 12.2 % (ref 10–15)
GLUCOSE SERPL-MCNC: 85 MG/DL (ref 70–99)
HCT VFR BLD AUTO: 41.8 % (ref 40–53)
HGB BLD-MCNC: 13.9 G/DL (ref 13.3–17.7)
MCH RBC QN AUTO: 31 PG (ref 26.5–33)
MCHC RBC AUTO-ENTMCNC: 33.3 G/DL (ref 31.5–36.5)
MCV RBC AUTO: 93 FL (ref 78–100)
PLATELET # BLD AUTO: 429 10E3/UL (ref 150–450)
POTASSIUM SERPL-SCNC: 3.8 MMOL/L (ref 3.4–5.3)
RBC # BLD AUTO: 4.48 10E6/UL (ref 4.4–5.9)
SODIUM SERPL-SCNC: 139 MMOL/L (ref 135–145)
WBC # BLD AUTO: 7.3 10E3/UL (ref 4–11)

## 2024-01-12 PROCEDURE — 258N000003 HC RX IP 258 OP 636: Performed by: HOSPITALIST

## 2024-01-12 PROCEDURE — 36415 COLL VENOUS BLD VENIPUNCTURE: CPT | Performed by: HOSPITALIST

## 2024-01-12 PROCEDURE — 99232 SBSQ HOSP IP/OBS MODERATE 35: CPT | Performed by: HOSPITALIST

## 2024-01-12 PROCEDURE — 74177 CT ABD & PELVIS W/CONTRAST: CPT

## 2024-01-12 PROCEDURE — 120N000001 HC R&B MED SURG/OB

## 2024-01-12 PROCEDURE — 250N000013 HC RX MED GY IP 250 OP 250 PS 637: Performed by: HOSPITALIST

## 2024-01-12 PROCEDURE — 85027 COMPLETE CBC AUTOMATED: CPT | Performed by: HOSPITALIST

## 2024-01-12 PROCEDURE — 250N000011 HC RX IP 250 OP 636: Performed by: PHYSICIAN ASSISTANT

## 2024-01-12 PROCEDURE — 80048 BASIC METABOLIC PNL TOTAL CA: CPT | Performed by: HOSPITALIST

## 2024-01-12 PROCEDURE — 250N000009 HC RX 250: Performed by: PHYSICIAN ASSISTANT

## 2024-01-12 PROCEDURE — 250N000011 HC RX IP 250 OP 636: Performed by: HOSPITALIST

## 2024-01-12 RX ORDER — IOPAMIDOL 755 MG/ML
500 INJECTION, SOLUTION INTRAVASCULAR ONCE
Status: COMPLETED | OUTPATIENT
Start: 2024-01-12 | End: 2024-01-12

## 2024-01-12 RX ADMIN — ACETAMINOPHEN 650 MG: 325 TABLET, FILM COATED ORAL at 18:21

## 2024-01-12 RX ADMIN — PIPERACILLIN AND TAZOBACTAM 4.5 G: 4; .5 INJECTION, POWDER, FOR SOLUTION INTRAVENOUS at 18:26

## 2024-01-12 RX ADMIN — IOPAMIDOL 99 ML: 755 INJECTION, SOLUTION INTRAVENOUS at 10:48

## 2024-01-12 RX ADMIN — PIPERACILLIN AND TAZOBACTAM 4.5 G: 4; .5 INJECTION, POWDER, FOR SOLUTION INTRAVENOUS at 06:15

## 2024-01-12 RX ADMIN — ACETAMINOPHEN 650 MG: 325 TABLET, FILM COATED ORAL at 14:24

## 2024-01-12 RX ADMIN — OXYCODONE HYDROCHLORIDE 5 MG: 5 TABLET ORAL at 00:55

## 2024-01-12 RX ADMIN — OXYCODONE HYDROCHLORIDE 5 MG: 5 TABLET ORAL at 10:34

## 2024-01-12 RX ADMIN — ACETAMINOPHEN 650 MG: 325 TABLET, FILM COATED ORAL at 10:34

## 2024-01-12 RX ADMIN — OXYCODONE HYDROCHLORIDE 5 MG: 5 TABLET ORAL at 22:43

## 2024-01-12 RX ADMIN — ACETAMINOPHEN 650 MG: 325 TABLET, FILM COATED ORAL at 00:55

## 2024-01-12 RX ADMIN — SODIUM CHLORIDE, POTASSIUM CHLORIDE, SODIUM LACTATE AND CALCIUM CHLORIDE: 600; 310; 30; 20 INJECTION, SOLUTION INTRAVENOUS at 19:15

## 2024-01-12 RX ADMIN — SODIUM CHLORIDE 65 ML: 9 INJECTION, SOLUTION INTRAVENOUS at 10:48

## 2024-01-12 RX ADMIN — PIPERACILLIN AND TAZOBACTAM 4.5 G: 4; .5 INJECTION, POWDER, FOR SOLUTION INTRAVENOUS at 00:48

## 2024-01-12 RX ADMIN — PIPERACILLIN AND TAZOBACTAM 4.5 G: 4; .5 INJECTION, POWDER, FOR SOLUTION INTRAVENOUS at 12:49

## 2024-01-12 RX ADMIN — OXYCODONE HYDROCHLORIDE 5 MG: 5 TABLET ORAL at 14:25

## 2024-01-12 RX ADMIN — OXYCODONE HYDROCHLORIDE 5 MG: 5 TABLET ORAL at 18:21

## 2024-01-12 RX ADMIN — SODIUM CHLORIDE, POTASSIUM CHLORIDE, SODIUM LACTATE AND CALCIUM CHLORIDE: 600; 310; 30; 20 INJECTION, SOLUTION INTRAVENOUS at 00:55

## 2024-01-12 RX ADMIN — PIPERACILLIN AND TAZOBACTAM 4.5 G: 4; .5 INJECTION, POWDER, FOR SOLUTION INTRAVENOUS at 23:32

## 2024-01-12 RX ADMIN — ACETAMINOPHEN 650 MG: 325 TABLET, FILM COATED ORAL at 22:44

## 2024-01-12 ASSESSMENT — ACTIVITIES OF DAILY LIVING (ADL)
ADLS_ACUITY_SCORE: 18

## 2024-01-12 NOTE — CARE PLAN
Time of Care: 4655-9039     Orientation: A/O x 4   VS: /56 (BP Location: Left arm)   Pulse 69   Temp 97.9  F (36.6  C) (Oral)   Resp 18   Wt 89.2 kg (196 lb 9.6 oz)   SpO2 98%     Pain: C/o pain 7/10 Oxy & tyln  given during time   Activity: Indep.  Resp: RA    : voiding without problems    Lines: PIV-SL   Diet: Full Liquids   Plan: Abx, Pain management   Discharge: TBD

## 2024-01-12 NOTE — PLAN OF CARE
Goal Outcome Evaluation:  Care from 2522-9000    Inpatient Progress Note:  For complete assessment see flow sheet documentation.    /77 (BP Location: Right arm)   Pulse 51   Temp 98  F (36.7  C) (Oral)   Resp 18   Wt 89.2 kg (196 lb 9.6 oz)   SpO2 100%    Pt. Alert & oriented x 4. VSS. Rating pain 6-7/10 managed with Tylenol & Oxycodone. Independent in room. Ambulation  encouraged. Fluids infusing. Tolerating full liquid diet. On Zosyn  ABX . Repeat CT possibly today. CRS following.Will continue to monitor and provide cares.     Adilene Plasencia RN

## 2024-01-12 NOTE — CONSULTS
Interventional Radiology - Progress Note  Inpatient - [facility]  1/12/2024    IR Brief Note    IR consulted by colorectal surgery for evaluation for drain placement in 38yoM w perforated diverticulitis w abscess as demonstrated on CT on 1/7/24. Patient had persistent abdominal pain and repeat imaging was ordered this AM re-demonstrating abscess.     Patient w leukocytosis at admission, WBC now WNL. VSS, afebrile. On IV Zosyn.    All aforementioned reviewed w Dr Leon. Collection would be difficult to safely access given surrounding structures. Collection remains essentially unchanged from prior study on 1/7.     Recommend ongoing conservative management. Repeat imaging if clinical condition worsens. Dr Leon in agreement with plan.    Thank you for allowing us to participate in this patient's care. IR will not plan to follow. Please contact our service with any questions, concerns, or requests for other intervention.    Eagle Theodore PA-C  Interventional Radiology  [phone] (IR)  *[phone] ([facility] Office)

## 2024-01-12 NOTE — PROGRESS NOTES
COLON & RECTAL SURGERY  PROGRESS NOTE    January 12, 2024    SUBJECTIVE:  Feeling a bit better. Pain controlled on tylenol and oxycodone. Tolerating full liquids, no N/V. +liquid BM. WBC 7.3. AVSS.    OBJECTIVE:  Temp:  [97.9  F (36.6  C)-98  F (36.7  C)] 98  F (36.7  C)  Pulse:  [51-69] 51  Resp:  [18] 18  BP: (105-123)/(56-77) 123/77  SpO2:  [98 %-100 %] 100 %    Intake/Output Summary (Last 24 hours) at 1/12/2024 0930  Last data filed at 1/11/2024 1800  Gross per 24 hour   Intake 320 ml   Output --   Net 320 ml       GENERAL:  Awake, alert, no acute distress, lying in bed  HEAD: Nomocephalic atraumatic  SCLERA: anicteric  EXTREMITIES: warm and well perfused  ABDOMEN:  Soft,tender in lower abdomen, non-distended, no rebound or guarding, no peritoneal signs      LABS:  Lab Results   Component Value Date    WBC 7.3 01/12/2024     Lab Results   Component Value Date    HGB 13.9 01/12/2024     Lab Results   Component Value Date    HCT 41.8 01/12/2024     Lab Results   Component Value Date     01/12/2024     Last Basic Metabolic Panel:  Lab Results   Component Value Date     01/12/2024      Lab Results   Component Value Date    POTASSIUM 3.8 01/12/2024     Lab Results   Component Value Date    CHLORIDE 104 01/12/2024     Lab Results   Component Value Date    SANTOS 9.4 01/12/2024     Lab Results   Component Value Date    CO2 24 01/12/2024     Lab Results   Component Value Date    BUN 7.3 01/12/2024     Lab Results   Component Value Date    CR 0.77 01/12/2024     Lab Results   Component Value Date    GLC 85 01/12/2024       ASSESSMENT/PLAN: Marko Pastor is a 38 year old male admitted on 1/7/24 with perforated diverticulitis.      -CT scan today given persistent pain  -Full liquids  -Continue IV antibiotics  -Pain management, minimize narcotics  -Encourage ambulation  -No plans for surgery at this time      For questions/paging, please contact the CRS office at [phone].    Zelalem Wilde  KIM  [facility]  Phone: [phone]     Colon and Rectal Surgery Attending Note    Patient seen and examined independently.  Agree with above assessment and plan.  Patient seen and examined with the use of a phone .  Notes he is feeling better but still has 6 out of 10 pain.  Up walking some.  Passing some gas and liquid stool without bleeding.  White count has normalized.    Abdomen: Focal suprapubic tenderness without rebound or guarding    Plan:  Given the mesenteric location of this and persistent discomfort we will plan for CT scan.  Will make n.p.o. for now in case he needs IR drainage but will likely be able to continue full liquids and advance to low fiber later if he is feeling okay.    No plans for surgery at this time.    Await CT scan results, n.p.o. until then.    Continue IV Zosyn.    Sandy Dale MD  [facility]  [address], [address]  [place] [address]  T: [phone]  F: [phone]   [email or web address]

## 2024-01-12 NOTE — PROGRESS NOTES
[facility]    Medicine Progress Note - Hospitalist Service    Date of Admission:  1/7/2024    Assessment & Plan   Marko Pastor is a 38 year old male without any significant past medical history who presents with abdominal pain.     Perforated sigmoid diverticulitis w/trace pneumoperitoneum  Intraabdominal abscess  -Patient notes that he has had left lower quadrant abdominal pain that started about 1.5 weeks ago.  He notes that it has been worse when he has a bowel movement or when he moves suddenly.  He did have 1 episode of nausea with nonbloody emesis yesterday.  Denies any loose or watery stools.  No blood in the stool.  He notes that his abdominal pain worsened this morning so went to urgent care for presentation.  He denies any prior hospitalizations.  No prior colonoscopies.  He is not taking any prescription medications.  He works as a  and is on his feet for most of the day.  -ED, patient with low-grade fever of 99.7, nontachycardic and normotensive.  Saturating okay on room air.  CBC with leukocytosis to 17.6.  BMP unremarkable.  LFTs within normal limits.  Lipase within normal limits.  UA unremarkable.  CT abdomen pelvis done that showed a perforated sigmoid diverticulitis with trace pneumoperitoneum and small phlegmon adjacent to the sigmoid colon with no coalescing abscess or drainable collection.  Patient given Zosyn therapy.  -Colorectal surgery consulted. Trialing conservative measures with IV fluids, IV antibiotics  -repeat CT today now showing intraabdominal abscess, IR consulted for drain placement. Cont IV Zosyn         Diet: NPO for Medical/Clinical Reasons Except for: Meds    DVT Prophylaxis: Pneumatic Compression Devices  Collins Catheter: Not present  Lines: None     Cardiac Monitoring: None  Code Status: Full Code        Disposition Plan   Await further improvement         Leroy Rivera DO  Hospitalist Service  [facility]  Hospital  Securely message with [employer] (more info)  Text page via [facility] Paging/Directory   ______________________________________________________________________    Interval History   Still with some abd pain, tolerated full liquids ok. No nausea or vomiting. Had CT earlier and noted to have intraabdominal abscess, IR consulted for drain placement    Physical Exam   Vital Signs: Temp: 98  F (36.7  C) Temp src: Oral BP: 123/77 Pulse: 51   Resp: 18 SpO2: 100 % O2 Device: None (Room air)    Weight: 196 lbs 9.6 oz  Constitutional: awake, alert, and cooperative  Eyes: pupils equal, round and reactive to light and conjunctiva normal  ENT: normocepalic, without obvious abnormality, atramatic  Respiratory: no increased work of breathing, good air exchange, and clear to auscultation  Cardiovascular: regular rate and rhythm, no murmur noted, and no edema  GI: active bowel sounds, mild pain to palpation, soft  Skin: no bruising or bleeding  Neurologic: alert, moving all extremities    45 MINUTES SPENT BY ME on the date of service doing chart review, history, exam, documentation & further activities per the note.      Data   ------------------------- PAST 24 HR DATA REVIEWED -----------------------------------------------    I have personally reviewed the following data over the past 24 hrs:    7.3  \   13.9   / 429     139 104 7.3 /  85   3.8 24 0.77 \       Imaging results reviewed over the past 24 hrs:   Recent Results (from the past 24 hour(s))   CT Abdomen Pelvis w Contrast    Narrative    CT ABDOMEN AND PELVIS WITH CONTRAST 1/12/2024 10:54 AM    CLINICAL HISTORY: Diverticulitis, persistent pain.    TECHNIQUE: CT scan of the abdomen and pelvis was performed following  injection of IV contrast. Multiplanar reformats were obtained. Dose  reduction techniques were used.  CONTRAST: 99 mL Isovue-[id number]    COMPARISON: CT abdomen and pelvis 1/7/2024.    FINDINGS:   LOWER CHEST: Normal.    HEPATOBILIARY: No focal hepatic abnormality.  Suggestion of mild  hepatic steatosis. Gallbladder unremarkable.    PANCREAS: Normal.    SPLEEN: Normal.    ADRENAL GLANDS: Normal.    KIDNEYS/BLADDER: Normal.    BOWEL: Perforated sigmoid diverticulitis again identified. Increasing  gas and fluid collection at the diverticulitis site currently has  transverse measurement of 4.5 x 2.1 cm, series 3 image 160.  Craniocaudal oblique length of 5.6 cm, series 5 image 71. On the prior  exam this was approximately 4.7 x 3.1 x 5.4 cm 1 remeasuring the prior  study at a similar level. The internal gas content is significantly  increased, and the fluid content is somewhat smaller. Adjacent bubbles  of extraluminal gas noted. Remainder of the bowel shows no acute  abnormality. No evidence for appendicitis.    PELVIC ORGANS: Normal.    ADDITIONAL FINDINGS: None.    MUSCULOSKELETAL: Unremarkable.      Impression    IMPRESSION:   1.  Perforated sigmoid diverticulitis. Gas and fluid collection  adjacent to the diverticulitis consistent with abscess. The overall  size appears similar to the prior exam, though the internal gas  content is increased in the fluid content is slightly decreased.  Bubbles of adjacent extraluminal gas noted as well.  2.  No other new abnormality identified.

## 2024-01-13 LAB
ANION GAP SERPL CALCULATED.3IONS-SCNC: 11 MMOL/L (ref 7–15)
BUN SERPL-MCNC: 9 MG/DL (ref 6–20)
CALCIUM SERPL-MCNC: 9.2 MG/DL (ref 8.6–10)
CHLORIDE SERPL-SCNC: 102 MMOL/L (ref 98–107)
CREAT SERPL-MCNC: 0.91 MG/DL (ref 0.67–1.17)
DEPRECATED HCO3 PLAS-SCNC: 26 MMOL/L (ref 22–29)
EGFRCR SERPLBLD CKD-EPI 2021: >90 ML/MIN/1.73M2
ERYTHROCYTE [DISTWIDTH] IN BLOOD BY AUTOMATED COUNT: 12.1 % (ref 10–15)
GLUCOSE SERPL-MCNC: 89 MG/DL (ref 70–99)
HCT VFR BLD AUTO: 42.2 % (ref 40–53)
HGB BLD-MCNC: 13.9 G/DL (ref 13.3–17.7)
MCH RBC QN AUTO: 30.9 PG (ref 26.5–33)
MCHC RBC AUTO-ENTMCNC: 32.9 G/DL (ref 31.5–36.5)
MCV RBC AUTO: 94 FL (ref 78–100)
PLATELET # BLD AUTO: 473 10E3/UL (ref 150–450)
POTASSIUM SERPL-SCNC: 3.8 MMOL/L (ref 3.4–5.3)
RBC # BLD AUTO: 4.5 10E6/UL (ref 4.4–5.9)
SODIUM SERPL-SCNC: 139 MMOL/L (ref 135–145)
WBC # BLD AUTO: 6 10E3/UL (ref 4–11)

## 2024-01-13 PROCEDURE — 99232 SBSQ HOSP IP/OBS MODERATE 35: CPT | Performed by: HOSPITALIST

## 2024-01-13 PROCEDURE — 250N000011 HC RX IP 250 OP 636: Performed by: HOSPITALIST

## 2024-01-13 PROCEDURE — 36415 COLL VENOUS BLD VENIPUNCTURE: CPT | Performed by: HOSPITALIST

## 2024-01-13 PROCEDURE — 85027 COMPLETE CBC AUTOMATED: CPT | Performed by: HOSPITALIST

## 2024-01-13 PROCEDURE — 80048 BASIC METABOLIC PNL TOTAL CA: CPT | Performed by: HOSPITALIST

## 2024-01-13 PROCEDURE — 250N000013 HC RX MED GY IP 250 OP 250 PS 637: Performed by: HOSPITALIST

## 2024-01-13 PROCEDURE — 258N000003 HC RX IP 258 OP 636: Performed by: HOSPITALIST

## 2024-01-13 PROCEDURE — 120N000001 HC R&B MED SURG/OB

## 2024-01-13 RX ADMIN — PIPERACILLIN AND TAZOBACTAM 4.5 G: 4; .5 INJECTION, POWDER, FOR SOLUTION INTRAVENOUS at 23:42

## 2024-01-13 RX ADMIN — PIPERACILLIN AND TAZOBACTAM 4.5 G: 4; .5 INJECTION, POWDER, FOR SOLUTION INTRAVENOUS at 05:43

## 2024-01-13 RX ADMIN — OXYCODONE HYDROCHLORIDE 5 MG: 5 TABLET ORAL at 15:23

## 2024-01-13 RX ADMIN — OXYCODONE HYDROCHLORIDE 5 MG: 5 TABLET ORAL at 05:43

## 2024-01-13 RX ADMIN — ACETAMINOPHEN 650 MG: 325 TABLET, FILM COATED ORAL at 05:43

## 2024-01-13 RX ADMIN — PIPERACILLIN AND TAZOBACTAM 4.5 G: 4; .5 INJECTION, POWDER, FOR SOLUTION INTRAVENOUS at 11:46

## 2024-01-13 RX ADMIN — PIPERACILLIN AND TAZOBACTAM 4.5 G: 4; .5 INJECTION, POWDER, FOR SOLUTION INTRAVENOUS at 18:15

## 2024-01-13 RX ADMIN — ACETAMINOPHEN 650 MG: 325 TABLET, FILM COATED ORAL at 11:54

## 2024-01-13 RX ADMIN — HYDROMORPHONE HYDROCHLORIDE 0.4 MG: 0.2 INJECTION, SOLUTION INTRAMUSCULAR; INTRAVENOUS; SUBCUTANEOUS at 08:38

## 2024-01-13 RX ADMIN — SODIUM CHLORIDE, POTASSIUM CHLORIDE, SODIUM LACTATE AND CALCIUM CHLORIDE: 600; 310; 30; 20 INJECTION, SOLUTION INTRAVENOUS at 07:40

## 2024-01-13 ASSESSMENT — ACTIVITIES OF DAILY LIVING (ADL)
ADLS_ACUITY_SCORE: 18

## 2024-01-13 NOTE — PROGRESS NOTES
[facility]    Medicine Progress Note - Hospitalist Service    Date of Admission:  1/7/2024    Assessment & Plan   Marko Pastor is a 38 year old male without any significant past medical history who presents with abdominal pain.     Perforated sigmoid diverticulitis w/trace pneumoperitoneum  Intraabdominal abscess  -Patient notes that he has had left lower quadrant abdominal pain that started about 1.5 weeks ago.  He notes that it has been worse when he has a bowel movement or when he moves suddenly.  He did have 1 episode of nausea with nonbloody emesis yesterday.  Denies any loose or watery stools.  No blood in the stool.  He notes that his abdominal pain worsened this morning so went to urgent care for presentation.  He denies any prior hospitalizations.  No prior colonoscopies.  He is not taking any prescription medications.  He works as a  and is on his feet for most of the day.  -ED, patient with low-grade fever of 99.7, nontachycardic and normotensive.  Saturating okay on room air.  CBC with leukocytosis to 17.6.  BMP unremarkable.  LFTs within normal limits.  Lipase within normal limits.  UA unremarkable.  CT abdomen pelvis done that showed a perforated sigmoid diverticulitis with trace pneumoperitoneum and small phlegmon adjacent to the sigmoid colon with no coalescing abscess or drainable collection.  Patient given Zosyn therapy.  -Colorectal surgery consulted. Trialing conservative measures   -repeat CT 1/12 showing intraabdominal abscess, IR consulted for drain placement but not amenable  -cont IV Zosyn, diet being advanced. If tolerates that may be able to leave on oral antibiotics soon        Diet: Low Fiber Diet    DVT Prophylaxis: Pneumatic Compression Devices  Collins Catheter: Not present  Lines: None     Cardiac Monitoring: None  Code Status: Full Code        Disposition Plan   Possible discharge next 24-48 hours if continued improvement         Leroy PINK  DO Miguel  Hospitalist Service  [facility]  Securely message with [employer] (more info)  Text page via [employer] Paging/Directory   ______________________________________________________________________    Interval History   Seen by IR, abscess is not amenable to drain placement. Still with abd pain but no fever or chills, tolerating diet well and is hungry.    Physical Exam   Vital Signs: Temp: 97.8  F (36.6  C) Temp src: Oral BP: 111/76 Pulse: 52   Resp: 16 SpO2: 98 % O2 Device: None (Room air)    Weight: 196 lbs 9.6 oz  Constitutional: awake, alert, and cooperative  Eyes: pupils equal, round and reactive to light and conjunctiva normal  ENT: normocepalic, without obvious abnormality, atramatic  Respiratory: no increased work of breathing, good air exchange, and clear to auscultation  Cardiovascular: regular rate and rhythm, no murmur noted, and no edema  GI: active bowel sounds, mild pain to palpation, soft  Skin: no bruising or bleeding  Neurologic: alert, moving all extremities    45 MINUTES SPENT BY ME on the date of service doing chart review, history, exam, documentation & further activities per the note.      Data   ------------------------- PAST 24 HR DATA REVIEWED -----------------------------------------------    I have personally reviewed the following data over the past 24 hrs:    6.0  \   13.9   / 473 (H)     139 102 9.0 /  89   3.8 26 0.91 \       Imaging results reviewed over the past 24 hrs:   No results found for this or any previous visit (from the past 24 hour(s)).

## 2024-01-13 NOTE — PROGRESS NOTES
COLON & RECTAL SURGERY  PROGRESS NOTE    January 13, 2024    SUBJECTIVE: Abdominal pain overall improved.  Tolerating a full liquid diet.  No increased pain with oral intake.  CT results reviewed from yesterday with stable intra-abdominal abscess not amenable to percutaneous drainage.    OBJECTIVE:  Temp:  [97.1  F (36.2  C)-97.8  F (36.6  C)] 97.8  F (36.6  C)  Pulse:  [46-53] 52  Resp:  [16-18] 16  BP: (106-111)/(71-76) 111/76  SpO2:  [98 %-100 %] 98 %    Intake/Output Summary (Last 24 hours) at 1/13/2024 1119  Last data filed at 1/13/2024 0813  Gross per 24 hour   Intake 240 ml   Output --   Net 240 ml       GENERAL:  Awake, alert, no acute distress  EXTREMITIES: Warm and well perfused, no edema   ABDOMEN:  Soft, minimally tender, non-distended. No guarding, rigidity, or peritoneal signs.     LABS:  Lab Results   Component Value Date    WBC 6.0 01/13/2024     Lab Results   Component Value Date    HGB 13.9 01/13/2024     Lab Results   Component Value Date    HCT 42.2 01/13/2024     Lab Results   Component Value Date     01/13/2024     Last Basic Metabolic Panel:  Lab Results   Component Value Date     01/13/2024      Lab Results   Component Value Date    POTASSIUM 3.8 01/13/2024     Lab Results   Component Value Date    CHLORIDE 102 01/13/2024     Lab Results   Component Value Date    SANTOS 9.2 01/13/2024     Lab Results   Component Value Date    CO2 26 01/13/2024     Lab Results   Component Value Date    BUN 9.0 01/13/2024     Lab Results   Component Value Date    CR 0.91 01/13/2024     Lab Results   Component Value Date    GLC 89 01/13/2024       ASSESSMENT/PLAN: Marko Pastor is a 38 year old male admitted with acute, perforated diverticulitis with a pericolonic abscess.    Is abdominal pain is improving, and he is tolerating a full liquid diet, will advance to low fiber diet today.  If he is able to tolerate diet advancement, can consider transition to oral antibiotics and discharge to  home.  Will plan for repeat CT scan in approximately 2 weeks to assess the intra-abdominal abscess.  If he does have a persistent intra-abdominal abscess, he may require an elective sigmoid resection in approximately 6 to 8 weeks.  With delay of surgery, he would be a candidate for colorectal anastomosis and would likely avoid a temporary ostomy bag.  If he has increasing pain with solid food intake, increasing fevers or increasing leukocytosis, will then consider a repeat CT scan of the abdomen and pelvis versus more urgent surgical intervention with a sigmoid colectomy and creation of a temporary colostomy.  CRS will continue to follow.    For questions/paging, please contact the CRS office at [phone].    Adelaide Ornelas MD  Colorectal Surgery    [facility]  [address] [address]  T: [phone]  F: [phone]

## 2024-01-13 NOTE — PLAN OF CARE
"Goal Outcome Evaluation:      Plan of Care Reviewed With: patient    Overall Patient Progress: improvingOverall Patient Progress: improving     Aox4. Tolerating Oxy and Tylenol for pain. VSS on RA. Denies nausea and vomiting. Up ad jim. LR infusing @ 75mls. Tolerating IV Zosyn. Full liquid diet maybe advance today as tolerated. Turkish speaking.   Continue conservative management.     /71 (BP Location: Left arm)   Pulse 53   Temp 97.8  F (36.6  C) (Oral)   Resp 18   Ht 1.72 m (5' 7.72\")   Wt 89.2 kg (196 lb 9.6 oz)   SpO2 98%   BMI 30.14 kg/m          "

## 2024-01-13 NOTE — PLAN OF CARE
"[id number]    VS /74 (BP Location: Left arm)   Pulse 51   Temp 97.1  F (36.2  C) (Oral)   Resp 18   Ht 1.72 m (5' 7.72\")   Wt 89.2 kg (196 lb 9.6 oz)   SpO2 100%   BMI 30.14 kg/m    Lung sounds clear  O2 room air  Bowel sounds active  Tolerating full liquid diet (plan to assess for advancement to low fiber tomorrow)  IVF Maintenance fluids infusing  Dressings IV dressing changed x2 this shift  Tests Abd pelvis CT showing possible abscess  IR consulted and determined drain placement is not optimal  CMS intact  Activity up independently in room  Pain oxycodone and tylenol given PRN as available  Patient/family centered care multiple family members present at bedside this afternoon and attentive to patient and his needs  Discharge plan per colorectal, anticipate hospitalization over the weekend for IV antibiotics      "

## 2024-01-13 NOTE — PLAN OF CARE
Pt A/O x 4. Togolese speaking.  VSS and afebrile. LS clear. Room air. BS active, not passing gas. Scheduled PRN tylenol and oxycodone for Abd pain with relief. Up Ad jim. Advanced to low fiber diet and tolerating. Plan is transition to oral Abx and outpatient follow up. Colorectal following.  Will continue to monitor.

## 2024-01-14 VITALS
WEIGHT: 125.6 LBS | HEIGHT: 68 IN | RESPIRATION RATE: 18 BRPM | DIASTOLIC BLOOD PRESSURE: 74 MMHG | OXYGEN SATURATION: 100 % | TEMPERATURE: 97.8 F | BODY MASS INDEX: 19.04 KG/M2 | SYSTOLIC BLOOD PRESSURE: 124 MMHG | HEART RATE: 59 BPM

## 2024-01-14 LAB
ANION GAP SERPL CALCULATED.3IONS-SCNC: 8 MMOL/L (ref 7–15)
BUN SERPL-MCNC: 11 MG/DL (ref 6–20)
CALCIUM SERPL-MCNC: 9.3 MG/DL (ref 8.6–10)
CHLORIDE SERPL-SCNC: 104 MMOL/L (ref 98–107)
CREAT SERPL-MCNC: 0.86 MG/DL (ref 0.67–1.17)
DEPRECATED HCO3 PLAS-SCNC: 27 MMOL/L (ref 22–29)
EGFRCR SERPLBLD CKD-EPI 2021: >90 ML/MIN/1.73M2
ERYTHROCYTE [DISTWIDTH] IN BLOOD BY AUTOMATED COUNT: 12.1 % (ref 10–15)
GLUCOSE SERPL-MCNC: 92 MG/DL (ref 70–99)
HCT VFR BLD AUTO: 45.4 % (ref 40–53)
HGB BLD-MCNC: 14.8 G/DL (ref 13.3–17.7)
MCH RBC QN AUTO: 30.7 PG (ref 26.5–33)
MCHC RBC AUTO-ENTMCNC: 32.6 G/DL (ref 31.5–36.5)
MCV RBC AUTO: 94 FL (ref 78–100)
PLATELET # BLD AUTO: 501 10E3/UL (ref 150–450)
POTASSIUM SERPL-SCNC: 4.1 MMOL/L (ref 3.4–5.3)
RBC # BLD AUTO: 4.82 10E6/UL (ref 4.4–5.9)
SODIUM SERPL-SCNC: 139 MMOL/L (ref 135–145)
WBC # BLD AUTO: 6.3 10E3/UL (ref 4–11)

## 2024-01-14 PROCEDURE — 250N000011 HC RX IP 250 OP 636: Performed by: HOSPITALIST

## 2024-01-14 PROCEDURE — 80048 BASIC METABOLIC PNL TOTAL CA: CPT | Performed by: HOSPITALIST

## 2024-01-14 PROCEDURE — 85027 COMPLETE CBC AUTOMATED: CPT | Performed by: HOSPITALIST

## 2024-01-14 PROCEDURE — 36415 COLL VENOUS BLD VENIPUNCTURE: CPT | Performed by: HOSPITALIST

## 2024-01-14 PROCEDURE — 99239 HOSP IP/OBS DSCHRG MGMT >30: CPT | Performed by: HOSPITALIST

## 2024-01-14 RX ORDER — OXYCODONE HYDROCHLORIDE 5 MG/1
5 TABLET ORAL EVERY 6 HOURS PRN
Qty: 10 TABLET | Refills: 0 | Status: ON HOLD | OUTPATIENT
Start: 2024-01-14 | End: 2024-02-05

## 2024-01-14 RX ORDER — DOCUSATE SODIUM 100 MG/1
100 CAPSULE, LIQUID FILLED ORAL 2 TIMES DAILY
Qty: 14 CAPSULE | Refills: 0 | Status: SHIPPED | OUTPATIENT
Start: 2024-01-14 | End: 2024-01-21

## 2024-01-14 RX ADMIN — PIPERACILLIN AND TAZOBACTAM 4.5 G: 4; .5 INJECTION, POWDER, FOR SOLUTION INTRAVENOUS at 11:50

## 2024-01-14 RX ADMIN — PIPERACILLIN AND TAZOBACTAM 4.5 G: 4; .5 INJECTION, POWDER, FOR SOLUTION INTRAVENOUS at 06:20

## 2024-01-14 ASSESSMENT — ACTIVITIES OF DAILY LIVING (ADL)
ADLS_ACUITY_SCORE: 18

## 2024-01-14 NOTE — PROGRESS NOTES
COLON & RECTAL SURGERY  PROGRESS NOTE    SUBJECTIVE: Denies abdominal pain.  Tolerating a low fiber diet.    OBJECTIVE:  Temp:  [97.8  F (36.6  C)-98.1  F (36.7  C)] 98.1  F (36.7  C)  Pulse:  [47-51] 47  Resp:  [16-18] 18  BP: (104-124)/(64-72) 124/71  SpO2:  [98 %] 98 %    Intake/Output Summary (Last 24 hours) at 1/13/2024 1119  Last data filed at 1/13/2024 0813  Gross per 24 hour   Intake 240 ml   Output --   Net 240 ml       LABS:  Lab Results   Component Value Date    WBC 6.0 01/13/2024     Lab Results   Component Value Date    HGB 13.9 01/13/2024     Lab Results   Component Value Date    HCT 42.2 01/13/2024     Lab Results   Component Value Date     01/13/2024     Last Basic Metabolic Panel:  Lab Results   Component Value Date     01/13/2024      Lab Results   Component Value Date    POTASSIUM 3.8 01/13/2024     Lab Results   Component Value Date    CHLORIDE 102 01/13/2024     Lab Results   Component Value Date    SANTOS 9.2 01/13/2024     Lab Results   Component Value Date    CO2 26 01/13/2024     Lab Results   Component Value Date    BUN 9.0 01/13/2024     Lab Results   Component Value Date    CR 0.91 01/13/2024     Lab Results   Component Value Date    GLC 89 01/13/2024       ASSESSMENT/PLAN: Marko Pastor is a 38 year old male admitted with acute, perforated diverticulitis with a pericolonic abscess.    Continue low fiber diet.  Would recommend a low fiber diet for approximately 2 weeks, then can resume a regular diet.  Will transition to oral antibiotics with Augmentin today.  Would recommend an additional 10 days given intra-abdominal abscess.  Okay for discharge to home today from a colorectal surgery standpoint.  Our office will arrange an outpatient CT in 1 to 2 weeks with a follow-up appointment.    For questions/paging, please contact the CRS office at [phone].    Adelaide Ornelas MD  Colorectal Surgery    [facility]  [address]   [place] [address]  T: [phone]  F: [phone]

## 2024-01-14 NOTE — DISCHARGE SUMMARY
[facility]  Hospitalist Discharge Summary      Date of Admission:  1/7/2024  Date of Discharge:  1/14/2024  Discharging Provider: Leroy Rivera DO  Discharge Service: Hospitalist Service    Discharge Diagnoses   Perforated sigmoid diverticulitis w/trace pneumoperitoneum  Intraabdominal abscess      Follow-ups Needed After Discharge   Follow-up Appointments     Follow-up and recommended labs and tests       Follow up with primary care provider, Physician No Ref-Primary, within 7   days for hospital follow- up.  No follow up labs or test are needed.    Follow up with colorectal surgery team in 1-2 weeks or as directed          Discharge Disposition   Discharged to home  Condition at discharge: Stable    Hospital Course   Marko Pastor is a 38 year old male without any significant past medical history who presents with abdominal pain.     Perforated sigmoid diverticulitis w/trace pneumoperitoneum  Intraabdominal abscess  -Patient notes that he has had left lower quadrant abdominal pain that started about 1.5 weeks ago.  He notes that it has been worse when he has a bowel movement or when he moves suddenly.  He did have 1 episode of nausea with nonbloody emesis yesterday.  Denies any loose or watery stools.  No blood in the stool.  He notes that his abdominal pain worsened this morning so went to urgent care for presentation.  He denies any prior hospitalizations.  No prior colonoscopies.  He is not taking any prescription medications.  He works as a  and is on his feet for most of the day.  -ED, patient with low-grade fever of 99.7, nontachycardic and normotensive.  Saturating okay on room air.  CBC with leukocytosis to 17.6.  BMP unremarkable.  LFTs within normal limits.  Lipase within normal limits.  UA unremarkable.  CT abdomen pelvis done that showed a perforated sigmoid diverticulitis with trace pneumoperitoneum and small phlegmon adjacent to the sigmoid colon with no  coalescing abscess or drainable collection.  Patient given Zosyn therapy.  -Colorectal surgery consulted. Trialing conservative measures   -repeat CT 1/12 showing intraabdominal abscess, IR consulted for drain placement but not amenable  -completed 8 days of IV zosyn, CRS team recommends additional 10 days of augmentin and then close outpt follow-up for repeat CT and possible surgical intervention  -tolerated low fat diet- cont     Consultations This Hospital Stay   COLORECTAL SURGERY IP CONSULT  INTERVENTIONAL RADIOLOGY ADULT/PEDS IP CONSULT    Code Status   Full Code    Time Spent on this Encounter   I, Leroy Rivera DO, personally saw the patient today and spent greater than 30 minutes discharging this patient.       Leroy Rivera DO  [facility]  [address] [place] [address]  Phone: [phone]  Fax: [phone]  ______________________________________________________________________    Physical Exam   Vital Signs: Temp: 97.8  F (36.6  C) Temp src: Oral BP: 124/74 Pulse: 59   Resp: 18 SpO2: 100 % O2 Device: None (Room air)    Weight: 125 lbs 9.6 oz  Face to face completed day of discharge       Primary Care Physician   Physician No Ref-Primary    Discharge Orders      Reason for your hospital stay    Admitted for abd pain and found to have diverticulitis and developed abd abscess as well. Complete entire course of oral augmentin and follow up closely with colorectal surgery team as outpt for repeat CT and possible surgery. Cont low fiber diet     Follow-up and recommended labs and tests     Follow up with primary care provider, Physician No Ref-Primary, within 7 days for hospital follow- up.  No follow up labs or test are needed.    Follow up with colorectal surgery team in 1-2 weeks or as directed     Activity    Your activity upon discharge: activity as tolerated     Diet    Follow this diet upon discharge: Orders Placed This Encounter      Low Fiber Diet        Significant Results and Procedures   Most Recent 3 CBC's:  Recent Labs   Lab Test 01/14/24  0640 01/13/24  0714 01/12/24  0734   WBC 6.3 6.0 7.3   HGB 14.8 13.9 13.9   MCV 94 94 93   * 473* 429     Most Recent 3 BMP's:  Recent Labs   Lab Test 01/14/24  0640 01/13/24  0714 01/12/24  0734    139 139   POTASSIUM 4.1 3.8 3.8   CHLORIDE 104 102 104   CO2 27 26 24   BUN 11.0 9.0 7.3   CR 0.86 0.91 0.77   ANIONGAP 8 11 11   SANTOS 9.3 9.2 9.4   GLC 92 89 85     Most Recent 2 LFT's:  Recent Labs   Lab Test 01/07/24  1543   AST 16   ALT 27   ALKPHOS 67   BILITOTAL 0.7     Most Recent 3 INR's:No lab results found.  Most Recent 3 Hemoglobins:  Recent Labs   Lab Test 01/14/24  0640 01/13/24  0714 01/12/24  0734   HGB 14.8 13.9 13.9     Most Recent 3 Troponin's:No lab results found.  Most Recent 3 BNP's:No lab results found.  Most Recent D-dimer:No lab results found.,   Results for orders placed or performed during the hospital encounter of 01/07/24   CT Abdomen Pelvis w Contrast    Narrative    EXAM: CT ABDOMEN PELVIS W CONTRAST  LOCATION: [facility]  DATE: 1/7/2024    INDICATION: llq pain, constipation, suspect sigmoid diveriticulits  COMPARISON: None.  TECHNIQUE: CT scan of the abdomen and pelvis was performed following injection of IV contrast. Multiplanar reformats were obtained. Dose reduction techniques were used.  CONTRAST: 100mL Isovue 370    FINDINGS:   LOWER CHEST: Lung bases are clear.    HEPATOBILIARY: Probable mild hepatic steatosis. No worrisome liver lesions. Normal gallbladder. No biliary ductal dilation.    PANCREAS: Normal.    SPLEEN: Normal.    ADRENAL GLANDS: Normal.    KIDNEYS/BLADDER: Normal.    BOWEL: There or findings of perforated sigmoid diverticulitis with extensive infiltration of the lower abdominal mesentery, trace pneumoperitoneum, and a 2.7 x 2.9 x 4.2 cm phlegmon adjacent to the sigmoid colon (3/154). No discrete/coalescing abscess or   drainable fluid  collection.    Elsewhere bowel is normal in caliber. The appendix is normal.    LYMPH NODES: No lymphadenopathy.    VASCULATURE: Unremarkable.    PELVIC ORGANS: Normal contours.    MUSCULOSKELETAL: No acute abnormality.      Impression    IMPRESSION:   1.  Perforated sigmoid diverticulitis with trace pneumoperitoneum and small phlegmon adjacent to the sigmoid colon. No coalescing abscess or drainable collection.  2.  Additional details in the findings.   CT Abdomen Pelvis w Contrast    Narrative    CT ABDOMEN AND PELVIS WITH CONTRAST 1/12/2024 10:54 AM    CLINICAL HISTORY: Diverticulitis, persistent pain.    TECHNIQUE: CT scan of the abdomen and pelvis was performed following  injection of IV contrast. Multiplanar reformats were obtained. Dose  reduction techniques were used.  CONTRAST: 99 mL Isovue-370    COMPARISON: CT abdomen and pelvis 1/7/2024.    FINDINGS:   LOWER CHEST: Normal.    HEPATOBILIARY: No focal hepatic abnormality. Suggestion of mild  hepatic steatosis. Gallbladder unremarkable.    PANCREAS: Normal.    SPLEEN: Normal.    ADRENAL GLANDS: Normal.    KIDNEYS/BLADDER: Normal.    BOWEL: Perforated sigmoid diverticulitis again identified. Increasing  gas and fluid collection at the diverticulitis site currently has  transverse measurement of 4.5 x 2.1 cm, series 3 image 160.  Craniocaudal oblique length of 5.6 cm, series 5 image 71. On the prior  exam this was approximately 4.7 x 3.1 x 5.4 cm 1 remeasuring the prior  study at a similar level. The internal gas content is significantly  increased, and the fluid content is somewhat smaller. Adjacent bubbles  of extraluminal gas noted. Remainder of the bowel shows no acute  abnormality. No evidence for appendicitis.    PELVIC ORGANS: Normal.    ADDITIONAL FINDINGS: None.    MUSCULOSKELETAL: Unremarkable.      Impression    IMPRESSION:   1.  Perforated sigmoid diverticulitis. Gas and fluid collection  adjacent to the diverticulitis consistent with abscess. The  overall  size appears similar to the prior exam, though the internal gas  content is increased in the fluid content is slightly decreased.  Bubbles of adjacent extraluminal gas noted as well.  2.  No other new abnormality identified.    YOVANI FERGUSON MD         SYSTEM ID:  [id number]       Discharge Medications   Current Discharge Medication List        START taking these medications    Details   amoxicillin-clavulanate (AUGMENTIN) 875-125 MG tablet Take 1 tablet by mouth every 12 hours for 10 days  Qty: 20 tablet, Refills: 0    Associated Diagnoses: Diverticulitis of colon with perforation      docusate sodium (COLACE) 100 MG capsule Take 1 capsule (100 mg) by mouth 2 times daily for 7 days  Qty: 14 capsule, Refills: 0    Associated Diagnoses: Diverticulitis of colon with perforation      oxyCODONE (ROXICODONE) 5 MG tablet Take 1 tablet (5 mg) by mouth every 6 hours as needed for severe pain (IF pain not managed with non-pharmacological and non-opioid interventions)  Qty: 10 tablet, Refills: 0    Associated Diagnoses: Diverticulitis of colon with perforation           Allergies   No Known Allergies

## 2024-01-14 NOTE — PLAN OF CARE
A/o x4. IV removed. Discharge instructions gone over with  and understanding verbalized. Meds given to patient (oxy, colace, augmentin). Letter for work given to patient. Family to transport home.

## 2024-01-14 NOTE — PLAN OF CARE
END OF SHIFT SUMMARY    1900 - 0700    Pt alert and oriented x 4. Chinese speaking. Denies pain. On RA. Independent in room. Abx administered per order. Tolerating low fiber diet.

## 2024-01-16 ENCOUNTER — MEDICAL CORRESPONDENCE (OUTPATIENT)
Dept: SCHEDULING | Facility: CLINIC | Age: 39
End: 2024-01-16

## 2024-01-26 ENCOUNTER — HOSPITAL ENCOUNTER (INPATIENT)
Facility: CLINIC | Age: 39
LOS: 10 days | Discharge: HOME | DRG: 330 | End: 2024-02-05
Attending: EMERGENCY MEDICINE | Admitting: INTERNAL MEDICINE

## 2024-01-26 ENCOUNTER — APPOINTMENT (OUTPATIENT)
Dept: CT IMAGING | Facility: CLINIC | Age: 39
DRG: 330 | End: 2024-01-26
Attending: EMERGENCY MEDICINE

## 2024-01-26 DIAGNOSIS — K57.20 PERFORATION OF SIGMOID COLON DUE TO DIVERTICULITIS: Primary | ICD-10-CM

## 2024-01-26 DIAGNOSIS — K57.20 DIVERTICULITIS OF COLON WITH PERFORATION: ICD-10-CM

## 2024-01-26 LAB
ALBUMIN SERPL BCG-MCNC: 4.2 G/DL (ref 3.5–5.2)
ALP SERPL-CCNC: 65 U/L (ref 40–150)
ALT SERPL W P-5'-P-CCNC: 22 U/L (ref 0–70)
ANION GAP SERPL CALCULATED.3IONS-SCNC: 11 MMOL/L (ref 7–15)
AST SERPL W P-5'-P-CCNC: 18 U/L (ref 0–45)
BASOPHILS # BLD AUTO: 0.1 10E3/UL (ref 0–0.2)
BASOPHILS NFR BLD AUTO: 0 %
BILIRUB SERPL-MCNC: 0.5 MG/DL
BUN SERPL-MCNC: 7.1 MG/DL (ref 6–20)
CALCIUM SERPL-MCNC: 8.8 MG/DL (ref 8.6–10)
CHLORIDE SERPL-SCNC: 100 MMOL/L (ref 98–107)
CREAT SERPL-MCNC: 0.76 MG/DL (ref 0.67–1.17)
DEPRECATED HCO3 PLAS-SCNC: 27 MMOL/L (ref 22–29)
EGFRCR SERPLBLD CKD-EPI 2021: >90 ML/MIN/1.73M2
EOSINOPHIL # BLD AUTO: 0.1 10E3/UL (ref 0–0.7)
EOSINOPHIL NFR BLD AUTO: 1 %
ERYTHROCYTE [DISTWIDTH] IN BLOOD BY AUTOMATED COUNT: 12.9 % (ref 10–15)
GLUCOSE SERPL-MCNC: 95 MG/DL (ref 70–99)
HCT VFR BLD AUTO: 42.9 % (ref 40–53)
HGB BLD-MCNC: 14.3 G/DL (ref 13.3–17.7)
HOLD SPECIMEN: NORMAL
HOLD SPECIMEN: NORMAL
IMM GRANULOCYTES # BLD: 0.1 10E3/UL
IMM GRANULOCYTES NFR BLD: 0 %
LACTATE SERPL-SCNC: 0.7 MMOL/L (ref 0.7–2)
LYMPHOCYTES # BLD AUTO: 1.7 10E3/UL (ref 0.8–5.3)
LYMPHOCYTES NFR BLD AUTO: 14 %
MCH RBC QN AUTO: 30.8 PG (ref 26.5–33)
MCHC RBC AUTO-ENTMCNC: 33.3 G/DL (ref 31.5–36.5)
MCV RBC AUTO: 93 FL (ref 78–100)
MONOCYTES # BLD AUTO: 0.8 10E3/UL (ref 0–1.3)
MONOCYTES NFR BLD AUTO: 6 %
NEUTROPHILS # BLD AUTO: 10 10E3/UL (ref 1.6–8.3)
NEUTROPHILS NFR BLD AUTO: 79 %
NRBC # BLD AUTO: 0 10E3/UL
NRBC BLD AUTO-RTO: 0 /100
PLATELET # BLD AUTO: 259 10E3/UL (ref 150–450)
POTASSIUM SERPL-SCNC: 4.1 MMOL/L (ref 3.4–5.3)
PROT SERPL-MCNC: 8 G/DL (ref 6.4–8.3)
RBC # BLD AUTO: 4.64 10E6/UL (ref 4.4–5.9)
SODIUM SERPL-SCNC: 138 MMOL/L (ref 135–145)
WBC # BLD AUTO: 12.8 10E3/UL (ref 4–11)

## 2024-01-26 PROCEDURE — 99222 1ST HOSP IP/OBS MODERATE 55: CPT | Performed by: INTERNAL MEDICINE

## 2024-01-26 PROCEDURE — 36415 COLL VENOUS BLD VENIPUNCTURE: CPT | Performed by: EMERGENCY MEDICINE

## 2024-01-26 PROCEDURE — 258N000003 HC RX IP 258 OP 636: Performed by: EMERGENCY MEDICINE

## 2024-01-26 PROCEDURE — 74177 CT ABD & PELVIS W/CONTRAST: CPT

## 2024-01-26 PROCEDURE — 83605 ASSAY OF LACTIC ACID: CPT | Performed by: EMERGENCY MEDICINE

## 2024-01-26 PROCEDURE — 96376 TX/PRO/DX INJ SAME DRUG ADON: CPT

## 2024-01-26 PROCEDURE — 87040 BLOOD CULTURE FOR BACTERIA: CPT | Performed by: EMERGENCY MEDICINE

## 2024-01-26 PROCEDURE — 82040 ASSAY OF SERUM ALBUMIN: CPT | Performed by: EMERGENCY MEDICINE

## 2024-01-26 PROCEDURE — 258N000003 HC RX IP 258 OP 636: Performed by: INTERNAL MEDICINE

## 2024-01-26 PROCEDURE — 250N000009 HC RX 250: Performed by: EMERGENCY MEDICINE

## 2024-01-26 PROCEDURE — 96365 THER/PROPH/DIAG IV INF INIT: CPT | Mod: 59

## 2024-01-26 PROCEDURE — 85025 COMPLETE CBC W/AUTO DIFF WBC: CPT | Performed by: EMERGENCY MEDICINE

## 2024-01-26 PROCEDURE — 96361 HYDRATE IV INFUSION ADD-ON: CPT

## 2024-01-26 PROCEDURE — 250N000011 HC RX IP 250 OP 636: Performed by: EMERGENCY MEDICINE

## 2024-01-26 PROCEDURE — 120N000001 HC R&B MED SURG/OB

## 2024-01-26 PROCEDURE — 99291 CRITICAL CARE FIRST HOUR: CPT | Mod: 25

## 2024-01-26 PROCEDURE — 96375 TX/PRO/DX INJ NEW DRUG ADDON: CPT

## 2024-01-26 RX ORDER — ONDANSETRON 2 MG/ML
4 INJECTION INTRAMUSCULAR; INTRAVENOUS EVERY 6 HOURS PRN
Status: DISCONTINUED | OUTPATIENT
Start: 2024-01-26 | End: 2024-01-31

## 2024-01-26 RX ORDER — ACETAMINOPHEN 650 MG/1
650 SUPPOSITORY RECTAL EVERY 4 HOURS PRN
Status: DISCONTINUED | OUTPATIENT
Start: 2024-01-26 | End: 2024-01-31

## 2024-01-26 RX ORDER — PROCHLORPERAZINE MALEATE 5 MG
10 TABLET ORAL EVERY 6 HOURS PRN
Status: DISCONTINUED | OUTPATIENT
Start: 2024-01-26 | End: 2024-02-05 | Stop reason: HOSPADM

## 2024-01-26 RX ORDER — NALOXONE HYDROCHLORIDE 0.4 MG/ML
0.2 INJECTION, SOLUTION INTRAMUSCULAR; INTRAVENOUS; SUBCUTANEOUS
Status: DISCONTINUED | OUTPATIENT
Start: 2024-01-26 | End: 2024-01-31

## 2024-01-26 RX ORDER — METRONIDAZOLE 500 MG/100ML
500 INJECTION, SOLUTION INTRAVENOUS ONCE
Status: COMPLETED | OUTPATIENT
Start: 2024-01-26 | End: 2024-01-26

## 2024-01-26 RX ORDER — AMOXICILLIN 250 MG
1 CAPSULE ORAL 2 TIMES DAILY PRN
Status: DISCONTINUED | OUTPATIENT
Start: 2024-01-26 | End: 2024-01-31

## 2024-01-26 RX ORDER — HYDROMORPHONE HCL IN WATER/PF 6 MG/30 ML
0.2 PATIENT CONTROLLED ANALGESIA SYRINGE INTRAVENOUS
Status: DISCONTINUED | OUTPATIENT
Start: 2024-01-26 | End: 2024-01-31

## 2024-01-26 RX ORDER — NALOXONE HYDROCHLORIDE 0.4 MG/ML
0.4 INJECTION, SOLUTION INTRAMUSCULAR; INTRAVENOUS; SUBCUTANEOUS
Status: DISCONTINUED | OUTPATIENT
Start: 2024-01-26 | End: 2024-01-31

## 2024-01-26 RX ORDER — PROCHLORPERAZINE 25 MG
25 SUPPOSITORY, RECTAL RECTAL EVERY 12 HOURS PRN
Status: DISCONTINUED | OUTPATIENT
Start: 2024-01-26 | End: 2024-02-05 | Stop reason: HOSPADM

## 2024-01-26 RX ORDER — ONDANSETRON 2 MG/ML
4 INJECTION INTRAMUSCULAR; INTRAVENOUS ONCE
Status: COMPLETED | OUTPATIENT
Start: 2024-01-26 | End: 2024-01-26

## 2024-01-26 RX ORDER — METRONIDAZOLE 500 MG/100ML
500 INJECTION, SOLUTION INTRAVENOUS EVERY 12 HOURS
Status: DISCONTINUED | OUTPATIENT
Start: 2024-01-27 | End: 2024-01-27

## 2024-01-26 RX ORDER — OXYCODONE HYDROCHLORIDE 5 MG/1
5 TABLET ORAL EVERY 6 HOURS PRN
Status: DISCONTINUED | OUTPATIENT
Start: 2024-01-26 | End: 2024-01-31

## 2024-01-26 RX ORDER — CIPROFLOXACIN 2 MG/ML
400 INJECTION, SOLUTION INTRAVENOUS EVERY 12 HOURS
Status: DISCONTINUED | OUTPATIENT
Start: 2024-01-27 | End: 2024-01-31

## 2024-01-26 RX ORDER — HYDROMORPHONE HCL IN WATER/PF 6 MG/30 ML
0.4 PATIENT CONTROLLED ANALGESIA SYRINGE INTRAVENOUS
Status: DISCONTINUED | OUTPATIENT
Start: 2024-01-26 | End: 2024-01-31

## 2024-01-26 RX ORDER — ACETAMINOPHEN 325 MG/1
650 TABLET ORAL EVERY 4 HOURS PRN
Status: DISCONTINUED | OUTPATIENT
Start: 2024-01-26 | End: 2024-01-31

## 2024-01-26 RX ORDER — DOCUSATE SODIUM 100 MG/1
100 CAPSULE, LIQUID FILLED ORAL 2 TIMES DAILY
COMMUNITY

## 2024-01-26 RX ORDER — HYDROMORPHONE HYDROCHLORIDE 1 MG/ML
0.5 INJECTION, SOLUTION INTRAMUSCULAR; INTRAVENOUS; SUBCUTANEOUS
Status: DISCONTINUED | OUTPATIENT
Start: 2024-01-26 | End: 2024-01-31

## 2024-01-26 RX ORDER — IOPAMIDOL 755 MG/ML
500 INJECTION, SOLUTION INTRAVASCULAR ONCE
Status: COMPLETED | OUTPATIENT
Start: 2024-01-26 | End: 2024-01-26

## 2024-01-26 RX ORDER — POLYETHYLENE GLYCOL 3350 17 G/17G
17 POWDER, FOR SOLUTION ORAL 2 TIMES DAILY PRN
Status: DISCONTINUED | OUTPATIENT
Start: 2024-01-26 | End: 2024-01-31

## 2024-01-26 RX ORDER — CIPROFLOXACIN 2 MG/ML
400 INJECTION, SOLUTION INTRAVENOUS ONCE
Status: COMPLETED | OUTPATIENT
Start: 2024-01-26 | End: 2024-01-26

## 2024-01-26 RX ORDER — LIDOCAINE 40 MG/G
CREAM TOPICAL
Status: DISCONTINUED | OUTPATIENT
Start: 2024-01-26 | End: 2024-01-31

## 2024-01-26 RX ORDER — SODIUM CHLORIDE, SODIUM LACTATE, POTASSIUM CHLORIDE, CALCIUM CHLORIDE 600; 310; 30; 20 MG/100ML; MG/100ML; MG/100ML; MG/100ML
INJECTION, SOLUTION INTRAVENOUS CONTINUOUS
Status: DISCONTINUED | OUTPATIENT
Start: 2024-01-26 | End: 2024-01-31

## 2024-01-26 RX ORDER — AMOXICILLIN 250 MG
2 CAPSULE ORAL 2 TIMES DAILY PRN
Status: DISCONTINUED | OUTPATIENT
Start: 2024-01-26 | End: 2024-01-31

## 2024-01-26 RX ORDER — ONDANSETRON 4 MG/1
4 TABLET, ORALLY DISINTEGRATING ORAL EVERY 6 HOURS PRN
Status: DISCONTINUED | OUTPATIENT
Start: 2024-01-26 | End: 2024-01-31

## 2024-01-26 RX ORDER — CALCIUM CARBONATE 500 MG/1
1000 TABLET, CHEWABLE ORAL 4 TIMES DAILY PRN
Status: DISCONTINUED | OUTPATIENT
Start: 2024-01-26 | End: 2024-02-05 | Stop reason: HOSPADM

## 2024-01-26 RX ADMIN — HYDROMORPHONE HYDROCHLORIDE 0.5 MG: 1 INJECTION, SOLUTION INTRAMUSCULAR; INTRAVENOUS; SUBCUTANEOUS at 16:48

## 2024-01-26 RX ADMIN — SODIUM CHLORIDE 1000 ML: 9 INJECTION, SOLUTION INTRAVENOUS at 16:47

## 2024-01-26 RX ADMIN — SODIUM CHLORIDE 56 ML: 9 INJECTION, SOLUTION INTRAVENOUS at 17:23

## 2024-01-26 RX ADMIN — CIPROFLOXACIN 400 MG: 400 INJECTION, SOLUTION INTRAVENOUS at 20:30

## 2024-01-26 RX ADMIN — METRONIDAZOLE 500 MG: 500 INJECTION, SOLUTION INTRAVENOUS at 19:26

## 2024-01-26 RX ADMIN — SODIUM CHLORIDE, POTASSIUM CHLORIDE, SODIUM LACTATE AND CALCIUM CHLORIDE: 600; 310; 30; 20 INJECTION, SOLUTION INTRAVENOUS at 22:25

## 2024-01-26 RX ADMIN — IOPAMIDOL 63 ML: 755 INJECTION, SOLUTION INTRAVENOUS at 17:23

## 2024-01-26 RX ADMIN — ONDANSETRON 4 MG: 2 INJECTION INTRAMUSCULAR; INTRAVENOUS at 16:47

## 2024-01-26 RX ADMIN — HYDROMORPHONE HYDROCHLORIDE 0.5 MG: 1 INJECTION, SOLUTION INTRAMUSCULAR; INTRAVENOUS; SUBCUTANEOUS at 18:25

## 2024-01-26 ASSESSMENT — ACTIVITIES OF DAILY LIVING (ADL)
ADLS_ACUITY_SCORE: 35
ADLS_ACUITY_SCORE: 18
ADLS_ACUITY_SCORE: 35
ADLS_ACUITY_SCORE: 35

## 2024-01-26 NOTE — ED TRIAGE NOTES
Pt here for lower abdominal pain, nausea, and vomiting. Recently admitted for diverticulitis w/ perf. Reports pain was worse while having a BM. Stool is soft. Did take oxycodone last night w/ relief. Has not taken anything for pain today. Denies bloody vomit or stool.

## 2024-01-26 NOTE — ED PROVIDER NOTES
History     Chief Complaint:  Abdominal Pain       A  was used (Greek).      Marko Pastor is a 38 year old male presenting to the emergency department for evaluation of abdominal pain. The patient reports he was in the hospital recently for perforated diverticulitis.  He was treated with Zosyn and managed nonoperatively.  He was then discharged home with 10 days of Augmentin.  He felt fine for almost a week and the pain came back last night. His pain was worse yesterday and the patient took oxycodone which he reports helped his pain. He reports he was nauseous this morning and vomited three times. He denies blood in his stool or diarrhea. He also denies cough, rhinorrhea, or a sore throat. He notes he finished his last course of antibiotics this past Sunday.      Independent Historian:   None - Patient Only        Medications:    Oxycodone    Past Medical History:    Diverticulitis with perforation     Past Surgical History:    None    Physical Exam   Patient Vitals for the past 24 hrs:   BP Temp Temp src Pulse Resp SpO2   01/26/24 2005 -- -- -- -- -- 99 %   01/26/24 2004 -- -- -- -- -- 99 %   01/26/24 2003 -- -- -- -- -- 97 %   01/26/24 2002 -- -- -- -- -- 99 %   01/26/24 2001 -- -- -- -- -- 98 %   01/26/24 2000 102/59 -- -- 64 -- 99 %   01/26/24 1933 -- -- -- -- -- 98 %   01/26/24 1932 -- -- -- -- -- 99 %   01/26/24 1931 -- -- -- -- -- 99 %   01/26/24 1930 104/56 -- -- 69 -- 99 %   01/26/24 1929 -- -- -- -- -- 100 %   01/26/24 1928 107/51 -- -- 68 -- 100 %   01/26/24 1900 93/64 -- -- 66 -- 99 %   01/26/24 1848 113/60 -- -- 66 -- 100 %   01/26/24 1827 120/69 -- -- 70 -- 100 %   01/26/24 1800 114/55 -- -- 75 -- 98 %   01/26/24 1700 117/66 -- -- 75 -- 100 %   01/26/24 1645 123/69 -- -- 73 -- 98 %   01/26/24 1543 138/85 97.9  F (36.6  C) Oral 80 18 100 %        Physical Exam  Constitutional: Vital signs reviewed as above  General: Alert, pleasant  HEENT: Moist mucous membranes  Eyes:  Pupils are equal, round, and reactive to light.   Neck: Normal range of motion  Cardiovascular: normal rate, Regular rhythm and normal heart sounds.  No MRG  Pulmonary/Chest: Effort normal and breath sounds normal. No respiratory distress. Patient has no wheezes. Patient has no rales.   Gastrointestinal: Soft. Positive bowel sounds. No MRG. Tenderness diffusely across lower abdomen with mild guarding.   Musculoskeletal/Extremities: Full ROM.  Endo: No pitting edema  Neurological: Alert, no focal deficits.  Skin: Skin is warm and dry.   Psychiatric: Pleasant      Emergency Department Course   Imaging:  CT Abdomen Pelvis w Contrast   Final Result   IMPRESSION:       Unchanged, contained perforated sigmoid diverticulitis, with associated ill-defined gas/fluid collection along the superior margin of the mid sigmoid colon, measuring 4 x 5 cm.             Laboratory:  Labs Ordered and Resulted from Time of ED Arrival to Time of ED Departure   CBC WITH PLATELETS AND DIFFERENTIAL - Abnormal       Result Value    WBC Count 12.8 (*)     RBC Count 4.64      Hemoglobin 14.3      Hematocrit 42.9      MCV 93      MCH 30.8      MCHC 33.3      RDW 12.9      Platelet Count 259      % Neutrophils 79      % Lymphocytes 14      % Monocytes 6      % Eosinophils 1      % Basophils 0      % Immature Granulocytes 0      NRBCs per 100 WBC 0      Absolute Neutrophils 10.0 (*)     Absolute Lymphocytes 1.7      Absolute Monocytes 0.8      Absolute Eosinophils 0.1      Absolute Basophils 0.1      Absolute Immature Granulocytes 0.1      Absolute NRBCs 0.0     COMPREHENSIVE METABOLIC PANEL - Normal    Sodium 138      Potassium 4.1      Carbon Dioxide (CO2) 27      Anion Gap 11      Urea Nitrogen 7.1      Creatinine 0.76      GFR Estimate >90      Calcium 8.8      Chloride 100      Glucose 95      Alkaline Phosphatase 65      AST 18      ALT 22      Protein Total 8.0      Albumin 4.2      Bilirubin Total 0.5     LACTIC ACID WHOLE BLOOD - Normal     Lactic Acid 0.7     BLOOD CULTURE   BLOOD CULTURE          Emergency Department Course & Assessments:             Interventions:  Medications   HYDROmorphone (PF) (DILAUDID) injection 0.5 mg (0.5 mg Intravenous $Given 1/26/24 1825)   ciprofloxacin (CIPRO) infusion 400 mg (400 mg Intravenous $New Bag 1/26/24 2030)   sodium chloride 0.9% BOLUS 1,000 mL (0 mLs Intravenous Stopped 1/26/24 1819)   ondansetron (ZOFRAN) injection 4 mg (4 mg Intravenous $Given 1/26/24 1647)   iopamidol (ISOVUE-370) solution 500 mL (63 mLs Intravenous $Given 1/26/24 1723)   CT scan flush (56 mLs Intravenous $Given 1/26/24 1723)   metroNIDAZOLE (FLAGYL) infusion 500 mg (0 mg Intravenous Stopped 1/26/24 2030)       Independent Interpretation (X-rays, CTs, rhythm strip):  None    Assessments/Consultations/Discussion of Management or Tests:    ED Course as of 01/26/24 2048 Fri Jan 26, 2024 1630 I obtained history and examined the patient as noted above.     1838 I spoke to Dr. Briceno, general surgery, regarding the patient.    1912 I spoke to Dr. Spencre of colorectal surgery regarding the patient.    1948 I spoke to Dr. Henson, hospitalist, regarding admission for the patient       Social Determinants of Health affecting care:   None    Disposition:  The patient was admitted to the hospital under the care of Dr. Zamorano.     Impression & Plan    Medical Decision Making:  Patient presents emergency department after recent admission for perforated diverticulitis which was managed nonoperatively as detailed above.  Symptoms have now completely returned including the pain.  CT scan shows virtually unchanged diverticulitis with contained bowel perforation.  His white count is slightly elevated.  Lactate was normal and electrolytes are unremarkable.  He was given fluids as well as Dilaudid and Zofran.  He is comfortable with still very tender to palpation.  I discussed the case with colorectal surgery who feels this is most likely a failure of  the Augmentin and recommended Cipro Flagyl which have been ordered.  Patient still has a fair amount of pain and as such we brought into the hospitalist service.  He will be kept n.p.o. after midnight just in case but the plan tentatively would be to try to manage this without surgery.      Diagnosis:    ICD-10-CM    1. Diverticulitis of colon with perforation  K57.20            Discharge Medications:  New Prescriptions    No medications on file          Scribe Disclosure:  I, Harris Tay, am serving as a scribe at 4:37 PM on 1/26/2024 to document services personally performed by Leroy Busby MD based on my observations and the provider's statements to me.   1/26/2024   Leroy Busby MD Walters, Brent Aaron, MD  01/26/24 2048

## 2024-01-27 LAB
ANION GAP SERPL CALCULATED.3IONS-SCNC: 10 MMOL/L (ref 7–15)
BUN SERPL-MCNC: 5.7 MG/DL (ref 6–20)
CALCIUM SERPL-MCNC: 8.7 MG/DL (ref 8.6–10)
CHLORIDE SERPL-SCNC: 100 MMOL/L (ref 98–107)
CREAT SERPL-MCNC: 0.72 MG/DL (ref 0.67–1.17)
DEPRECATED HCO3 PLAS-SCNC: 26 MMOL/L (ref 22–29)
EGFRCR SERPLBLD CKD-EPI 2021: >90 ML/MIN/1.73M2
ERYTHROCYTE [DISTWIDTH] IN BLOOD BY AUTOMATED COUNT: 12.9 % (ref 10–15)
GLUCOSE SERPL-MCNC: 93 MG/DL (ref 70–99)
HCT VFR BLD AUTO: 40.3 % (ref 40–53)
HGB BLD-MCNC: 13.5 G/DL (ref 13.3–17.7)
MCH RBC QN AUTO: 30.8 PG (ref 26.5–33)
MCHC RBC AUTO-ENTMCNC: 33.5 G/DL (ref 31.5–36.5)
MCV RBC AUTO: 92 FL (ref 78–100)
PLATELET # BLD AUTO: 223 10E3/UL (ref 150–450)
POTASSIUM SERPL-SCNC: 3.7 MMOL/L (ref 3.4–5.3)
RBC # BLD AUTO: 4.38 10E6/UL (ref 4.4–5.9)
SODIUM SERPL-SCNC: 136 MMOL/L (ref 135–145)
WBC # BLD AUTO: 13.7 10E3/UL (ref 4–11)

## 2024-01-27 PROCEDURE — 120N000001 HC R&B MED SURG/OB

## 2024-01-27 PROCEDURE — 250N000013 HC RX MED GY IP 250 OP 250 PS 637: Performed by: INTERNAL MEDICINE

## 2024-01-27 PROCEDURE — 258N000003 HC RX IP 258 OP 636: Performed by: INTERNAL MEDICINE

## 2024-01-27 PROCEDURE — 80048 BASIC METABOLIC PNL TOTAL CA: CPT | Performed by: INTERNAL MEDICINE

## 2024-01-27 PROCEDURE — 250N000011 HC RX IP 250 OP 636: Performed by: INTERNAL MEDICINE

## 2024-01-27 PROCEDURE — 99232 SBSQ HOSP IP/OBS MODERATE 35: CPT | Performed by: INTERNAL MEDICINE

## 2024-01-27 PROCEDURE — 85027 COMPLETE CBC AUTOMATED: CPT | Performed by: INTERNAL MEDICINE

## 2024-01-27 PROCEDURE — 36415 COLL VENOUS BLD VENIPUNCTURE: CPT | Performed by: INTERNAL MEDICINE

## 2024-01-27 RX ORDER — METRONIDAZOLE 500 MG/100ML
500 INJECTION, SOLUTION INTRAVENOUS EVERY 8 HOURS
Status: DISCONTINUED | OUTPATIENT
Start: 2024-01-27 | End: 2024-01-28

## 2024-01-27 RX ADMIN — ACETAMINOPHEN 650 MG: 325 TABLET, FILM COATED ORAL at 15:45

## 2024-01-27 RX ADMIN — SODIUM CHLORIDE, POTASSIUM CHLORIDE, SODIUM LACTATE AND CALCIUM CHLORIDE: 600; 310; 30; 20 INJECTION, SOLUTION INTRAVENOUS at 13:27

## 2024-01-27 RX ADMIN — METRONIDAZOLE 500 MG: 500 INJECTION, SOLUTION INTRAVENOUS at 21:59

## 2024-01-27 RX ADMIN — OXYCODONE HYDROCHLORIDE 5 MG: 5 TABLET ORAL at 15:46

## 2024-01-27 RX ADMIN — HYDROMORPHONE HYDROCHLORIDE 0.2 MG: 0.2 INJECTION, SOLUTION INTRAMUSCULAR; INTRAVENOUS; SUBCUTANEOUS at 12:12

## 2024-01-27 RX ADMIN — SODIUM CHLORIDE, POTASSIUM CHLORIDE, SODIUM LACTATE AND CALCIUM CHLORIDE: 600; 310; 30; 20 INJECTION, SOLUTION INTRAVENOUS at 03:54

## 2024-01-27 RX ADMIN — CIPROFLOXACIN 400 MG: 400 INJECTION, SOLUTION INTRAVENOUS at 08:16

## 2024-01-27 RX ADMIN — ACETAMINOPHEN 650 MG: 325 TABLET, FILM COATED ORAL at 21:58

## 2024-01-27 RX ADMIN — ACETAMINOPHEN 650 MG: 325 TABLET, FILM COATED ORAL at 08:23

## 2024-01-27 RX ADMIN — METRONIDAZOLE 500 MG: 500 INJECTION, SOLUTION INTRAVENOUS at 13:27

## 2024-01-27 RX ADMIN — OXYCODONE HYDROCHLORIDE 5 MG: 5 TABLET ORAL at 00:20

## 2024-01-27 RX ADMIN — OXYCODONE HYDROCHLORIDE 5 MG: 5 TABLET ORAL at 08:23

## 2024-01-27 RX ADMIN — CIPROFLOXACIN 400 MG: 400 INJECTION, SOLUTION INTRAVENOUS at 19:56

## 2024-01-27 RX ADMIN — METRONIDAZOLE 500 MG: 500 INJECTION, SOLUTION INTRAVENOUS at 06:01

## 2024-01-27 RX ADMIN — OXYCODONE HYDROCHLORIDE 5 MG: 5 TABLET ORAL at 21:58

## 2024-01-27 ASSESSMENT — ACTIVITIES OF DAILY LIVING (ADL)
ADLS_ACUITY_SCORE: 18

## 2024-01-27 NOTE — H&P
[facility]    History and Physical - Hospitalist Service       Date of Admission:  1/26/2024  Primary Care Physician   Physician No Ref-Primary  CONSULTANTS: Colorectal surgery    Assessment & Plan      Marko Pastor is a 38 year old male who is Burmese speaking with no appreciable past medical history other than diverticulosis was just hospitalized and discharged 1/14/2024 for a stay for with diverticulitis and intra-abdominal abscess that was not amenable to drainage.  Patient at that time was treated with Zosyn and transition to 10 days of Augmentin which she finished at home on 1/21/2025.  He did well for 3 or so days but yesterday developed some abdominal pain again.  He took some oxycodone that he had left over and then again today the pain actually got worse.  The pain is worse with having bowel movements.  Denies any bright red blood per rectum or melena.  Has had associated nausea and vomiting with this.  The pain is across his lower abdomen more so on the left side than the right however.  In the emergency room patient was found to be afebrile with normal blood pressures.  His white blood count was up at over 12, hemoglobin is 14.3, platelets 259.  His lactic acid was normal with a negative anion gap of only 11.  Creatinine was 0.76 and his electrolytes were all normal.  He underwent another CT scan of the abdomen that really is unchanged from his previous showing a contained perforation from his diverticulitis.  I thought that the patient probably had a failure of his Augmentin is coming in for IV antibiotics and pain control.       Diverticulitis with contained perforation, abdomen pain, nausea/vomiting  Patient was just here for a similar stay and discharge 1/14/2024 with 10 days of Augmentin after being treated with Zosyn.  He did well until yesterday when developed more pain.  His CT scan on admission shows contained perforated sigmoid diverticulitis with associated  ill-defined gas/fluid collection along the superior margin of the mid sigmoid colon measuring 4 x 5 cm which is unchanged from his previous stay.  This area is not amenable to drainage.  He was seen by colorectal surgery last day so we will get them involved again but at this point my opinion the goal is medical management with the goal of not having to undergo surgery.  Patient had been given Zosyn/Augmentin so now we will give him a course of Cipro/Flagyl.  Will place the patient on bowel rest and making him n.p.o. except for medications.  Start the patient on IV fluids.  He will be given Tylenol, oxycodone, and Dilaudid IV for breakthrough pain.  Once the patient is able to eat he needs to be on a low residual diet. Will give antiemetics.     Constipation  Continue on a bowel regimen given the need for opioids    Discussed plan of care with  patient using  as he is Mauritanian-speaking, discussed with Dr Busby in the emergency room.   Diet:  N.p.o.  DVT Prophylaxis: Pneumatic Compression Devices in case surgery is necessary  Collins Catheter: Not present  Lines: None     Cardiac Monitoring: None    RESTRAINTS: Not indicated  Code Status:  Full code       This document was created using voice recognition technology.  Please excuse any typographical errors that may have occurred.  Please call with any questions.   Disposition Plan      Expected Discharge Date: 01/27/2024           patient is going to require a few days of IV antibiotics with bowel rest.  Suspect he will be here for a number of days going forward.       Jr Henson MD  Hospitalist Service  [facility]  Securely message with [employer] (more info)  Text page via [employer] Paging/Directory     Length of stay: Anticipate greater than 2 midnight hospitalization for evaluation and treatment of perforated diverticulitis          ______________________________________________________________________    Chief Complaint   Abdominal  pain    History is obtained from the patient  [place] [facility] [place]     History of Present Illness   Marko Pastor is a 38 year old male who is Azeri speaking with no appreciable past medical history other than diverticulosis was just hospitalized and discharged 1/14/2024 for a stay for with diverticulitis and intra-abdominal abscess that was not amenable to drainage.  Patient at that time was treated with Zosyn and transition to 10 days of Augmentin which she finished at home on 1/21/2025.  He did well for 3 or so days but yesterday developed some abdominal pain again.  He took some oxycodone that he had left over and then again today the pain actually got worse.  The pain is worse with having bowel movements.  Denies any bright red blood per rectum or melena.  Has had associated nausea and vomiting with this.  The pain is across his lower abdomen more so on the left side than the right however.  In the emergency room patient was found to be afebrile with normal blood pressures.  His white blood count was up at over 12, hemoglobin is 14.3, platelets 259.  His lactic acid was normal with a negative anion gap of only 11.  Creatinine was 0.76 and his electrolytes were all normal.  He underwent another CT scan of the abdomen that really is unchanged from his previous showing a contained perforation from his diverticulitis.  I thought that the patient probably had a failure of his Augmentin is coming in for IV antibiotics and pain control.      Past Medical History    No past medical history on file.    Past Surgical History   No past surgical history on file.    Prior to Admission Medications reviewed by me  Prior to Admission Medications   Prescriptions Last Dose Informant Patient Reported? Taking?   oxyCODONE (ROXICODONE) 5 MG tablet   No No   Sig: Take 1 tablet (5 mg) by mouth every 6 hours as needed for severe pain (IF pain not managed with non-pharmacological and non-opioid  interventions)      Facility-Administered Medications: None        Allergies   No Known Allergies     REVIEW OF SYSTEMS:  A comprehensive review of systems was negative except for items noted in the HPI.  Patient currently denies any fever, chills, sweats, diarrhea, shortness of breath, or chest pain.        Physical Exam   Vital Signs: Temp: 97.9  F (36.6  C) Temp src: Oral BP: 104/56 Pulse: 69   Resp: 18 SpO2: 98 % O2 Device: None (Room air)    Weight: 0 lbs 0 oz    General appearance: Patient is alert and oriented x3, no apparent distress, pleasant and conversing normally, speaking in full sentences, appears stated age, Malay-speaking, sitting up in a cot in the emergency room  HEENT:  Atraumatic/normal cephalic, EOMI, Pupils equally round and reactive to light, sclera non-icteric, Mucous membranes are moist  NECK:  supple without bruit or lymphadenopathy  RESPIRATORY: Clear to auscultation bilateral, good air movement  CARDIOVASCULAR: Regular rate and rhythm, normal S1/S2, no murmurs, rubs, or gallops present, peripheral pulses intact  GASTROINTESTINAL: Non-distended, left greater than right lower abdominal tenderness, soft, bowel sounds present throughout, no mass or hepatosplenomegaly  MUSCULOSKELETAL: without deformity, normal range of motion  SKIN:  Warm, dry, no rashes, no mottling  NEUROLOGIC:  Cranial nerves II-XII intact, without any focal deficits, strength 5/5 throughout  EXTREMITIES:  Moves all extremities, no clubbing, cyanosis, nor edema  :  Collins not present         Data     I have personally reviewed the following data over the past 24 hrs:    12.8 (H)  \   14.3   / 259     138 100 7.1 /  95   4.1 27 0.76 \     ALT: 22 AST: 18 AP: 65 TBILI: 0.5   ALB: 4.2 TOT PROTEIN: 8.0 LIPASE: N/A     Procal: N/A CRP: N/A Lactic Acid: 0.7         Imaging:   Results for orders placed or performed during the hospital encounter of 01/26/24   CT Abdomen Pelvis w Contrast    Narrative    EXAM: CT ABDOMEN PELVIS  W CONTRAST  LOCATION: [facility]  DATE: 1/26/2024    INDICATION: Abdominal pain; recent perforated diverticulitis.  COMPARISON: None available.  TECHNIQUE: CT scan of the abdomen and pelvis was performed following injection of IV contrast. Multiplanar reformats were obtained. Dose reduction techniques were used.  CONTRAST: 63 mL Isovue 370.    FINDINGS:    LOWER CHEST: Mild dependent atelectasis.    HEPATOBILIARY: Mild hepatic steatosis.    Gallbladder is normal.    No intrahepatic or intrahepatic biliary ductal dilatation.    PANCREAS: Enhances normally. No peripancreatic inflammatory fat stranding.    SPLEEN: Enhances normally. Normal size.    ADRENAL GLANDS: Normal.    KIDNEYS: Both kidneys enhance symmetrically, without hydronephrosis.    No nephroureterolithiasis.    Urinary bladder is unremarkable.    PELVIC ORGANS: No suspicious abnormality.    BOWEL: Unchanged acute sigmoid diverticulitis, with contained perforation along the superior margin of the mid sigmoid colon. Ill-defined gas/fluid collection at this location measures approximately 4 x 5 cm, similar to prior. Normal appendix. Trace free   dependent pelvic fluid, which appears reactive.    No zander intraperitoneal free air.    LYMPH NODES: No suspicious abdominal or pelvic lymphadenopathy.    VASCULATURE: No abdominal aortic aneurysm.    MUSCULOSKELETAL: No suspicious abnormality.    OTHER: No additionally significant abnormalities.      Impression    IMPRESSION:     Unchanged, contained perforated sigmoid diverticulitis, with associated ill-defined gas/fluid collection along the superior margin of the mid sigmoid colon, measuring 4 x 5 cm.     Procedures: None  I have personally have reviewed the patient's most up to date radiologic exams,  labs, orders, and medications myself

## 2024-01-27 NOTE — ED NOTES
[facility]  ED Nurse Handoff Report    ED Chief complaint: Abdominal Pain  . ED Diagnosis:   Final diagnoses:   None       Allergies: No Known Allergies    Code Status: Full Code    Activity level - Baseline/Home:  independent.  Activity Level - Current:   independent.   Lift room needed: No.   Bariatric: No   Needed: No   Isolation: No.   Infection: Not Applicable.     Respiratory status: Room air    Vital Signs (within 30 minutes):   Vitals:    01/26/24 1800 01/26/24 1827 01/26/24 1848 01/26/24 1900   BP: 114/55 120/69 113/60 93/64   Pulse: 75 70 66 66   Resp:       Temp:       TempSrc:       SpO2: 98% 100% 100% 99%       Cardiac Rhythm:  ,      Pain level:    Patient confused: No.   Patient Falls Risk: patient and family education.   Elimination Status: Has voided     Patient Report - Initial Complaint: . Abd pain  Focused Assessment:  Pt here for lower abdominal pain, nausea, and vomiting. Recently admitted for diverticulitis w/ perf. Reports pain was worse while having a BM. Stool is soft. Did take oxycodone last night w/ relief. Has not taken anything for pain today. Denies bloody vomit or stool.  pt Turkish speaking    Abnormal Results:   Labs Ordered and Resulted from Time of ED Arrival to Time of ED Departure   CBC WITH PLATELETS AND DIFFERENTIAL - Abnormal       Result Value    WBC Count 12.8 (*)     RBC Count 4.64      Hemoglobin 14.3      Hematocrit 42.9      MCV 93      MCH 30.8      MCHC 33.3      RDW 12.9      Platelet Count 259      % Neutrophils 79      % Lymphocytes 14      % Monocytes 6      % Eosinophils 1      % Basophils 0      % Immature Granulocytes 0      NRBCs per 100 WBC 0      Absolute Neutrophils 10.0 (*)     Absolute Lymphocytes 1.7      Absolute Monocytes 0.8      Absolute Eosinophils 0.1      Absolute Basophils 0.1      Absolute Immature Granulocytes 0.1      Absolute NRBCs 0.0     COMPREHENSIVE METABOLIC PANEL - Normal    Sodium 138      Potassium  4.1      Carbon Dioxide (CO2) 27      Anion Gap 11      Urea Nitrogen 7.1      Creatinine 0.76      GFR Estimate >90      Calcium 8.8      Chloride 100      Glucose 95      Alkaline Phosphatase 65      AST 18      ALT 22      Protein Total 8.0      Albumin 4.2      Bilirubin Total 0.5     LACTIC ACID WHOLE BLOOD - Normal    Lactic Acid 0.7     BLOOD CULTURE   BLOOD CULTURE        CT Abdomen Pelvis w Contrast   Final Result   IMPRESSION:       Unchanged, contained perforated sigmoid diverticulitis, with associated ill-defined gas/fluid collection along the superior margin of the mid sigmoid colon, measuring 4 x 5 cm.          Treatments provided: see MAR  Family Comments:   OBS brochure/video discussed/provided to patient:  Yes  ED Medications:   Medications   HYDROmorphone (PF) (DILAUDID) injection 0.5 mg (0.5 mg Intravenous $Given 1/26/24 1825)   sodium chloride 0.9% BOLUS 1,000 mL (0 mLs Intravenous Stopped 1/26/24 1819)   ondansetron (ZOFRAN) injection 4 mg (4 mg Intravenous $Given 1/26/24 1647)   iopamidol (ISOVUE-370) solution 500 mL (63 mLs Intravenous $Given 1/26/24 1723)   CT scan flush (56 mLs Intravenous $Given 1/26/24 1723)       Drips infusing:  No  For the majority of the shift this patient was Green.   Interventions performed were .    Sepsis treatment initiated: No    Cares/treatment/interventions/medications to be completed following ED care: admit orders  RECEIVING UNIT ED HANDOFF REVIEW    Above ED Nurse Handoff Report was reviewed: Yes  Reviewed by: Jayde Murcia RN on January 26, 2024 at 8:43 PM    ED Nurse Name: Reyna Elder RN  7:05 PM

## 2024-01-27 NOTE — CONSULTS
Colorectal Surgery Consultation Note    Marko Pastor MRN# [id number]   Age: 38 year old YOB: 1985     Date of Admission:  1/26/2024    Reason for consult: Diverticular abscess       Requesting physician: Dr. Henson              Assessment:   The patient is a 38 year old male with diverticular abscess failing outpatient medical management.         Recommendations:   IVF, Abx- cefoxitin and flagyl. Failed outpatient oral antibiotics.  NPO  Recheck WBC in AM  Reassuring he is feeling better.   Agree with change in antibiotic regimen. Ideally he would improve for elective resection, however if he fails medical management, would need resection with possible stoma during this admission. Concerned with his ongoing LLQ pain and worsening leukocytosis but given his overall health, reasonable to give him more time for improvement in his symptoms.   Reviewed plan with the patient and his nurse. No family was present today.  He has never had a colonoscopy.          History of Present Illness:   CC: Abdominal pain     The patient is a 38 year old male readmitted with recurrent abdominal pain after discharge with diverticular abscess. He was admitted in early January and discharged home on 1/14 with 7 more days of antibiotics Initially after cessation, they were feeling well but pain progressed. No fevers. Chills. No nausea or emesis. Having bowel movements but worsenes pain per the chart. When he has stool it hurts his abdomen with associated cramping. He had seen Dr. Dale while an inpatient with plans to follow up after discharge.           Past Medical History:   No past medical history on file.          Past Surgical History:   No past surgical history on file.          Social History:     Social History     Socioeconomic History    Marital status: Single     Spouse name: Not on file    Number of children: Not on file    Years of education: Not on file    Highest education level: Not on file    Occupational History    Not on file   Tobacco Use    Smoking status: Never    Smokeless tobacco: Never   Vaping Use    Vaping Use: Never used   Substance and Sexual Activity    Alcohol use: Not on file    Drug use: Not on file    Sexual activity: Not on file   Other Topics Concern    Not on file   Social History Narrative    Not on file     Social Determinants of Health     Financial Resource Strain: Not on file   Food Insecurity: Not on file   Transportation Needs: Not on file   Physical Activity: Not on file   Stress: Not on file   Social Connections: Not on file   Interpersonal Safety: Not on file   Housing Stability: Not on file   He has family and friends in town but they are working. He is uncertain who will visit today.          Family History:   No family history on file.             Allergies:    No Known Allergies          Medications:   No current facility-administered medications on file prior to encounter.  docusate sodium (COLACE) 100 MG capsule, Take 100 mg by mouth 2 times daily  oxyCODONE (ROXICODONE) 5 MG tablet, Take 1 tablet (5 mg) by mouth every 6 hours as needed for severe pain (IF pain not managed with non-pharmacological and non-opioid interventions)              Review of Systems:      10 point review of systems negative except for that which was mentioned in the HPI.        Physical Exam:   /54 (BP Location: Left arm)   Pulse 75   Temp 98.3  F (36.8  C) (Oral)   Resp 16   Wt 189 lb 14.4 oz   SpO2 96%   BMI 29.12 kg/m    General: Alert, interactive, NAD  Resp: CTAB, no crackles or wheezes  Cardiac: RRR, NS1,S2, No m/r/g  Abdomen: Soft, nontender, nondistended. +BS.  No HSM or masses, no rebound or guarding.   Extremities: No LE edema or obvious joint abnormalities  Skin: Warm and dry, no jaundice or rash            Data:   All laboratory data reviewed       Lab Results   Component Value Date     01/27/2024     01/26/2024     01/14/2024    Lab Results  "  Component Value Date    CHLORIDE 100 01/27/2024    CHLORIDE 100 01/26/2024    CHLORIDE 104 01/14/2024    Lab Results   Component Value Date    BUN 5.7 01/27/2024    BUN 7.1 01/26/2024    BUN 11.0 01/14/2024      Lab Results   Component Value Date    POTASSIUM 3.7 01/27/2024    POTASSIUM 4.1 01/26/2024    POTASSIUM 4.1 01/14/2024    Lab Results   Component Value Date    CO2 26 01/27/2024    CO2 27 01/26/2024    CO2 27 01/14/2024    Lab Results   Component Value Date    CR 0.72 01/27/2024    CR 0.76 01/26/2024    CR 0.86 01/14/2024        Lab Results   Component Value Date    WBC 13.7 (H) 01/27/2024    WBC 12.8 (H) 01/26/2024    WBC 6.3 01/14/2024    HGB 13.5 01/27/2024    HGB 14.3 01/26/2024    HGB 14.8 01/14/2024    HCT 40.3 01/27/2024    HCT 42.9 01/26/2024    HCT 45.4 01/14/2024    MCV 92 01/27/2024    MCV 93 01/26/2024    MCV 94 01/14/2024     01/27/2024     01/26/2024     (H) 01/14/2024     No results found for: \"SED\"  Lab Results   Component Value Date    AST 18 01/26/2024    AST 16 01/07/2024    ALT 22 01/26/2024    ALT 27 01/07/2024    ALKPHOS 65 01/26/2024    ALKPHOS 67 01/07/2024    BILITOTAL 0.5 01/26/2024    BILITOTAL 0.7 01/07/2024     CT scan of the abdomen:  Reveiwed personally and compared to prior scans. Diverticulosis with phlegmon and low amount of free air in the pericolonic tissues. No free air or free fluid. No abscess amenable to drainage.       Serina Irving MD    [facility]  [address], [address]  [place] [address]  T: [phone]  F: [phone]  Pager: [phone]      For questions/paging, please contact the CRS office at [phone].       1/27/2024  11:08 AM             "

## 2024-01-27 NOTE — PLAN OF CARE
For vital signs and complete assessments, please see documentation flowsheets.     [id number]  Pertinent assessments: Pt A&Ox4.Montserratian speaking, needs  with cares. Ind in room. On RA. Afebrile. VSS.Denies nausea & SOB. C/O pain given prn oxy po. PIV infusing LR@150mls/hr. Had 1 BM overnight.    Major Shift Events: none    Treatment Plan: NPO. IVF. IV abx. Pain control. Colorectal following    Bedside Nurse: Arvind Nowak RN

## 2024-01-27 NOTE — PHARMACY-ADMISSION MEDICATION HISTORY
Pharmacist Admission Medication History    Admission medication history is complete. The information provided in this note is only as accurate as the sources available at the time of the update.    Information Source(s): Patient, Hospital records, and CareEverywhere/SureScripts via in-person    Pertinent Information: none    Changes made to PTA medication list:  Added: colace  Deleted: None  Changed: None    Allergies reviewed with patient and updates made in EHR: yes    Medication History Completed By: Tyrone Ward RPH 1/26/2024 9:18 PM    Prior to Admission medications    Medication Sig Last Dose Taking? Auth Provider Long Term End Date   docusate sodium (COLACE) 100 MG capsule Take 100 mg by mouth 2 times daily  Yes Unknown, Entered By History     oxyCODONE (ROXICODONE) 5 MG tablet Take 1 tablet (5 mg) by mouth every 6 hours as needed for severe pain (IF pain not managed with non-pharmacological and non-opioid interventions) 1/25/2024 Yes Leroy Rivera, DO

## 2024-01-27 NOTE — PLAN OF CARE
Goal Outcome Evaluation:         End of Shift Summary  For vital signs and complete assessments, please see documentation flowsheets.     Pertinent assessments: Pt A&Ox4, VSS, on RA. Lung sounds Clear. Complains of abdominal pain. Denies nausea. PIV Running LR. Pt speaks Albanian, requires a . NPO.     Major Shift Events: Admitted to unit.    Treatment Plan: IV Cipro & Flagyl. IV fluids. Manage pain, Colorectal Following.

## 2024-01-27 NOTE — PLAN OF CARE
Goal Outcome Evaluation:      Pertinent assessments: Pt A&Ox4. Central African speaking. VSS, on RA. Up independent in the room. Denies nausea & SOB. C/O abd pain prn tylenol, oxy and IV dilaudid given. PIV infusing LR@100 ml/hr. C/o increased pain when having BM.    Major Shift Events: none    Treatment Plan: NPO. IVF. IV abx. Pain control. Colorectal following    Bedside Nurse: Lois Avila RN

## 2024-01-27 NOTE — PROGRESS NOTES
[facility]  Hospitalist Progress Note  Eric Ford MD 01/27/2024    Reason for Stay (Diagnosis): Diverticulitis with perforation.  Readmission         Assessment and Plan:      Summary of Stay: Marko Pastor is a 38 year old male who is Salvadorean speaking with no appreciable past medical history other than diverticulosis was just hospitalized and discharged 1/14/2024 for a stay for with diverticulitis and intra-abdominal abscess that was not amenable to drainage.  Patient at that time was treated with Zosyn and transition to 10 days of Augmentin which she finished at home on 1/21/2025.  He did well for 3 or so days but yesterday developed some abdominal pain again.  He took some oxycodone that he had left over and then again today the pain actually got worse.  The pain is worse with having bowel movements.  Denies any bright red blood per rectum or melena.  Has had associated nausea and vomiting with this.  The pain is across his lower abdomen more so on the left side than the right however.  In the emergency room patient was found to be afebrile with normal blood pressures.  His white blood count was up at over 12, hemoglobin is 14.3, platelets 259.  His lactic acid was normal with a negative anion gap of only 11.  Creatinine was 0.76 and his electrolytes were all normal.  He underwent another CT scan of the abdomen that really is unchanged from his previous showing a contained perforation from his diverticulitis.      The patient was started on Cipro/Flagyl on admission for treatment of diverticulitis with perforation.  Colorectal surgery has been consulted.    Plans today:  -Continue Cipro/Flagyl  -Colorectal surgery consult  -Decrease maintenance IV fluid rate to 100 cc/h  -Keep n.p.o. for now  -Repeat white blood cell count in the morning        Diverticulitis with contained perforation, abdomen pain, nausea/vomiting  - recurrent admit for the same; was recently hospitalized  1/7/24-1/14/24, treated with IV Zosyn while hospitalized, and discharged on a 10-day course of oral Augmentin  - CT scan this admission shows contained perforated sigmoid diverticulitis with associated ill-defined gas/fluid collection along the superior margin of the mid sigmoid colon measuring 4 x 5 cm which is unchanged from his previous stay.  This area was not felt to be amenable to drainage during his recent hospitalization.    - treat with Cipro/Flagyl.    - bowel rest  - pain meds prn  - IVF hydration  - CRS consult      Constipation  Continue on a bowel regimen given the need for opioids     Diet:  N.p.o.  DVT Prophylaxis: Pneumatic Compression Devices in case surgery is necessary  Collins Catheter: Not present  Lines: None     Cardiac Monitoring: None     RESTRAINTS: Not indicated  Code Status:  Full code  DISPO:  home in 2-3 days anticipated after sx improved, pending CRS evaluation           Interval History (Subjective):      Patient complains of persistent left lower quadrant pain.  Had recurrent pain after discharge.  Pain similar to symptoms he had during his previous hospitalization for diverticulitis.  I communicated with the patient using  through iPhone                  Physical Exam:      Last Vital Signs:  /54 (BP Location: Left arm)   Pulse 75   Temp 98.3  F (36.8  C) (Oral)   Resp 16   Wt 86.1 kg (189 lb 14.4 oz)   SpO2 96%   BMI 29.12 kg/m      No intake or output data in the 24 hours ending 01/27/24 1056    Constitutional: Awake, alert, cooperative, no apparent distress appears comfortable     Respiratory: Clear to auscultation bilaterally, no crackles or wheezing   Cardiovascular: Regular rate and rhythm, normal S1 and S2, and no murmur noted   Abdomen: Hypoactive bowel sounds, soft, non-distended, TTP LLQ.  No rebound or guarding.   Skin: No rashes, no cyanosis, dry to touch   Neuro: Alert and oriented x3, no weakness, numbness, memory loss   Extremities: No  edema, normal range of motion   Other(s): Communicated with this patient today using a  through iPhone       All other systems: Negative          Medications:      All current medications were reviewed with changes reflected in problem list.         Data:      All new lab and imaging data was reviewed.   Labs:  Recent Labs   Lab 01/27/24  0723   WBC 13.7*   HGB 13.5   HCT 40.3   MCV 92         Imaging:   Recent Results (from the past 24 hour(s))   CT Abdomen Pelvis w Contrast    Narrative    EXAM: CT ABDOMEN PELVIS W CONTRAST  LOCATION: [facility]  DATE: 1/26/2024    INDICATION: Abdominal pain; recent perforated diverticulitis.  COMPARISON: None available.  TECHNIQUE: CT scan of the abdomen and pelvis was performed following injection of IV contrast. Multiplanar reformats were obtained. Dose reduction techniques were used.  CONTRAST: 63 mL Isovue 370.    FINDINGS:    LOWER CHEST: Mild dependent atelectasis.    HEPATOBILIARY: Mild hepatic steatosis.    Gallbladder is normal.    No intrahepatic or intrahepatic biliary ductal dilatation.    PANCREAS: Enhances normally. No peripancreatic inflammatory fat stranding.    SPLEEN: Enhances normally. Normal size.    ADRENAL GLANDS: Normal.    KIDNEYS: Both kidneys enhance symmetrically, without hydronephrosis.    No nephroureterolithiasis.    Urinary bladder is unremarkable.    PELVIC ORGANS: No suspicious abnormality.    BOWEL: Unchanged acute sigmoid diverticulitis, with contained perforation along the superior margin of the mid sigmoid colon. Ill-defined gas/fluid collection at this location measures approximately 4 x 5 cm, similar to prior. Normal appendix. Trace free   dependent pelvic fluid, which appears reactive.    No zander intraperitoneal free air.    LYMPH NODES: No suspicious abdominal or pelvic lymphadenopathy.    VASCULATURE: No abdominal aortic aneurysm.    MUSCULOSKELETAL: No suspicious abnormality.    OTHER: No  additionally significant abnormalities.      Impression    IMPRESSION:     Unchanged, contained perforated sigmoid diverticulitis, with associated ill-defined gas/fluid collection along the superior margin of the mid sigmoid colon, measuring 4 x 5 cm.

## 2024-01-28 LAB
ERYTHROCYTE [DISTWIDTH] IN BLOOD BY AUTOMATED COUNT: 12.8 % (ref 10–15)
HCT VFR BLD AUTO: 39 % (ref 40–53)
HGB BLD-MCNC: 13 G/DL (ref 13.3–17.7)
MCH RBC QN AUTO: 30.7 PG (ref 26.5–33)
MCHC RBC AUTO-ENTMCNC: 33.3 G/DL (ref 31.5–36.5)
MCV RBC AUTO: 92 FL (ref 78–100)
PLATELET # BLD AUTO: 220 10E3/UL (ref 150–450)
RBC # BLD AUTO: 4.24 10E6/UL (ref 4.4–5.9)
WBC # BLD AUTO: 11.8 10E3/UL (ref 4–11)

## 2024-01-28 PROCEDURE — 250N000011 HC RX IP 250 OP 636: Performed by: INTERNAL MEDICINE

## 2024-01-28 PROCEDURE — 258N000003 HC RX IP 258 OP 636: Performed by: STUDENT IN AN ORGANIZED HEALTH CARE EDUCATION/TRAINING PROGRAM

## 2024-01-28 PROCEDURE — 250N000013 HC RX MED GY IP 250 OP 250 PS 637: Performed by: INTERNAL MEDICINE

## 2024-01-28 PROCEDURE — 36415 COLL VENOUS BLD VENIPUNCTURE: CPT | Performed by: INTERNAL MEDICINE

## 2024-01-28 PROCEDURE — 99232 SBSQ HOSP IP/OBS MODERATE 35: CPT | Performed by: INTERNAL MEDICINE

## 2024-01-28 PROCEDURE — 120N000001 HC R&B MED SURG/OB

## 2024-01-28 PROCEDURE — 258N000003 HC RX IP 258 OP 636: Performed by: INTERNAL MEDICINE

## 2024-01-28 PROCEDURE — 85027 COMPLETE CBC AUTOMATED: CPT | Performed by: INTERNAL MEDICINE

## 2024-01-28 RX ORDER — METRONIDAZOLE 500 MG/100ML
500 INJECTION, SOLUTION INTRAVENOUS EVERY 8 HOURS
Status: DISCONTINUED | OUTPATIENT
Start: 2024-01-28 | End: 2024-01-31

## 2024-01-28 RX ORDER — METRONIDAZOLE 500 MG/100ML
500 INJECTION, SOLUTION INTRAVENOUS EVERY 12 HOURS
Status: DISCONTINUED | OUTPATIENT
Start: 2024-01-28 | End: 2024-01-28

## 2024-01-28 RX ADMIN — METRONIDAZOLE 500 MG: 500 INJECTION, SOLUTION INTRAVENOUS at 21:48

## 2024-01-28 RX ADMIN — SODIUM CHLORIDE, POTASSIUM CHLORIDE, SODIUM LACTATE AND CALCIUM CHLORIDE: 600; 310; 30; 20 INJECTION, SOLUTION INTRAVENOUS at 01:02

## 2024-01-28 RX ADMIN — ACETAMINOPHEN 650 MG: 325 TABLET, FILM COATED ORAL at 13:23

## 2024-01-28 RX ADMIN — HYDROMORPHONE HYDROCHLORIDE 0.2 MG: 0.2 INJECTION, SOLUTION INTRAMUSCULAR; INTRAVENOUS; SUBCUTANEOUS at 06:14

## 2024-01-28 RX ADMIN — CIPROFLOXACIN 400 MG: 400 INJECTION, SOLUTION INTRAVENOUS at 07:26

## 2024-01-28 RX ADMIN — OXYCODONE HYDROCHLORIDE 5 MG: 5 TABLET ORAL at 19:52

## 2024-01-28 RX ADMIN — METRONIDAZOLE 500 MG: 500 INJECTION, SOLUTION INTRAVENOUS at 06:07

## 2024-01-28 RX ADMIN — CIPROFLOXACIN 400 MG: 400 INJECTION, SOLUTION INTRAVENOUS at 19:48

## 2024-01-28 RX ADMIN — METRONIDAZOLE 500 MG: 500 INJECTION, SOLUTION INTRAVENOUS at 13:24

## 2024-01-28 RX ADMIN — HYDROMORPHONE HYDROCHLORIDE 0.2 MG: 0.2 INJECTION, SOLUTION INTRAMUSCULAR; INTRAVENOUS; SUBCUTANEOUS at 01:02

## 2024-01-28 RX ADMIN — SODIUM CHLORIDE, POTASSIUM CHLORIDE, SODIUM LACTATE AND CALCIUM CHLORIDE: 600; 310; 30; 20 INJECTION, SOLUTION INTRAVENOUS at 13:24

## 2024-01-28 RX ADMIN — OXYCODONE HYDROCHLORIDE 5 MG: 5 TABLET ORAL at 13:23

## 2024-01-28 ASSESSMENT — ACTIVITIES OF DAILY LIVING (ADL)
ADLS_ACUITY_SCORE: 18

## 2024-01-28 NOTE — PROGRESS NOTES
[facility]  Hospitalist Progress Note  Eric Ford MD 01/28/2024    Reason for Stay (Diagnosis): Readmission for management of diverticulitis with contained perforation         Assessment and Plan:      Summary of Stay: Marko Pastor is a 38 year old male who is Italian speaking with no appreciable past medical history other than diverticulosis was just hospitalized and discharged 1/14/2024 for a stay for with diverticulitis and intra-abdominal abscess that was not amenable to drainage.  Patient at that time was treated with Zosyn and transition to 10 days of Augmentin which she finished at home on 1/21/2025.  He did well for 3 or so days but yesterday developed some abdominal pain again.  He took some oxycodone that he had left over and then again today the pain actually got worse.  The pain is worse with having bowel movements.  Denies any bright red blood per rectum or melena.  Has had associated nausea and vomiting with this.  The pain is across his lower abdomen more so on the left side than the right however.  In the emergency room patient was found to be afebrile with normal blood pressures.  His white blood count was up at over 12, hemoglobin is 14.3, platelets 259.  His lactic acid was normal with a negative anion gap of only 11.  Creatinine was 0.76 and his electrolytes were all normal.  He underwent another CT scan of the abdomen that really is unchanged from his previous showing a contained perforation from his diverticulitis.       The patient was started on Cipro/Flagyl on admission for treatment of diverticulitis with perforation.  Colorectal surgery has been consulted.     Plans today:  -Continue Cipro/Flagyl  -Colorectal surgery continues to follow  -continue maintenance IVF  -Keep n.p.o. for now; diet per CRS  -Repeat white blood cell count results reviewed; improved to 11.8 from 13.7 yesterday        Diverticulitis with contained perforation, abdomen pain,  nausea/vomiting  - recurrent admit for the same; was recently hospitalized 1/7/24-1/14/24, treated with IV Zosyn while hospitalized, and discharged on a 10-day course of oral Augmentin  - CT scan this admission shows contained perforated sigmoid diverticulitis with associated ill-defined gas/fluid collection along the superior margin of the mid sigmoid colon measuring 4 x 5 cm which is unchanged from his previous stay.  This area was not felt to be amenable to drainage during his recent hospitalization.    - treat with Cipro/Flagyl.    - bowel rest  - pain meds prn  - IVF hydration  - CRS consult appreciated.  No urgent need for surgery currently, but if patient fails to improve with conservative management surgery this admission will need to be considered.  If he recovers with conservative measures, would anticipate future elective partial colectomy to prevent recurrence     Constipation  Continue on a bowel regimen given the need for opioids     Diet:  N.p.o.  DVT Prophylaxis: Pneumatic Compression Devices in case surgery is necessary.  Frequently OOB and mobilizing in room  Collins Catheter: Not present  Lines: None     Cardiac Monitoring: None     RESTRAINTS: Not indicated  Code Status:  Full code  DISPO:  home after pain improved, tolerating diet, and when okay with colorectal surgery service           Interval History (Subjective):      No new concerns.  Continues to have some left lower quadrant abdominal pain, particularly during bowel movements.  No fevers.  White blood cell count improved today.                  Physical Exam:      Last Vital Signs:  /46 (BP Location: Left arm)   Pulse 67   Temp 98.9  F (37.2  C) (Oral)   Resp 20   Wt 86.1 kg (189 lb 14.4 oz)   SpO2 98%   BMI 29.12 kg/m      No intake or output data in the 24 hours ending 01/28/24 1059    Constitutional: Awake, alert, cooperative, no apparent distress     Respiratory: Clear to auscultation bilaterally, no crackles or wheezing    Cardiovascular: Regular rate and rhythm, normal S1 and S2, and no murmur noted   Abdomen: Normal bowel sounds, soft, non-distended, mild tenderness palpation left lower quadrant   Skin: No rashes, no cyanosis, dry to touch   Neuro: Alert and oriented x3, no weakness, numbness, memory loss   Extremities: No edema, normal range of motion   Other(s): I communicated with this patient today via  using [employer] interpreting service over iPhone       All other systems: Negative          Medications:      All current medications were reviewed with changes reflected in problem list.         Data:      All new lab and imaging data was reviewed.   Labs:  Recent Labs   Lab 01/27/24  0723      POTASSIUM 3.7   CHLORIDE 100   CO2 26   ANIONGAP 10   GLC 93   BUN 5.7*   CR 0.72   GFRESTIMATED >90   SANTOS 8.7     Recent Labs   Lab 01/28/24  0725   WBC 11.8*   HGB 13.0*   HCT 39.0*   MCV 92         Imaging:   No results found for this or any previous visit (from the past 24 hour(s)).

## 2024-01-28 NOTE — PLAN OF CARE
Goal Outcome Evaluation:      Plan of Care Reviewed With: patient        To Do:  End of Shift Summary    For vital signs and complete assessments, please see documentation flowsheets.       Pertinent assessments: cares 3:30-7pm   Pt A&Ox4.  VSS, on RA. Up independent in the room. Denies nausea & SOB.complained of abdominal  pain of 8/10, gave  prn tylenol, oxy, PIV infusing LR@100 ml/hr. Latvian speaking, needs      Major Shift Events: his sister called, no one knew he was in the hospital. She talked with the pt     Treatment Plan: NPO. IVF. IV abx. Pain control. Colorectal following

## 2024-01-28 NOTE — PROGRESS NOTES
[id number]    Pt is alert and oriented x4 vital signs stable on RA. Armenian speaking needs  services. PIV infusing LR @ 100 ml/hr with intermittent  IV Cipro and flagyl. Abdominal pain 8/10 managed with tylenol and oxy with relief. Nursing will continue to monitor.

## 2024-01-28 NOTE — PROGRESS NOTES
Colorectal Surgery Progress Note      Interval History:  No acute events overnight.  History obtained using a .  Per the patient has not had any gas in the last 24 hours.  No bowel movement.  His pain has improved since admission.  He is hungry this morning.    Physical Exam:   Temp:  [98.9  F (37.2  C)-100.1  F (37.8  C)] 98.9  F (37.2  C)  Pulse:  [66-69] 67  Resp:  [20] 20  BP: (111-113)/(46-49) 113/46  SpO2:  [97 %-98 %] 98 %  General: Alert, oriented, appears comfortable, NAD.  Respiratory: breathing non labored  Abdomen: Soft, nondistended, tender on the left side      Data:   All laboratory and imaging data in the past 24 hours reviewed  No intake/output data recorded.  Recent Labs   Lab Test 01/28/24  0725 01/27/24  0723 01/26/24  1601   WBC 11.8* 13.7* 12.8*   HGB 13.0* 13.5 14.3    223 259      Recent Labs   Lab Test 01/27/24  0723 01/26/24  1601 01/14/24  0640    138 139   POTASSIUM 3.7 4.1 4.1   CHLORIDE 100 100 104   CO2 26 27 27   BUN 5.7* 7.1 11.0   CR 0.72 0.76 0.86   ANIONGAP 10 11 8   SANTOS 8.7 8.8 9.3   GLC 93 95 92        Recent Labs   Lab Test 01/26/24  1601   PROTTOTAL 8.0   ALBUMIN 4.2   BILITOTAL 0.5   ALKPHOS 65   AST 18   ALT 22       Assessment and Plan:     Marko is an 38-year-old Azeri-speaking only male with diverticulitis with second admission.  He remains on IV antibiotics and his white count is coming down.  His pain persists and has not had bowel function.  With the white count trending down we will try liquid diet today.    Plan  Clear liquid diet today and plan to go very slow with the diet advancement  Continue IV antibiotics  Encourage ambulation  Trend white count  We will continue to follow    Discussed with Dr. Burgess Courtney Spencer MD  Fellow, Colon and Rectal Surgery  [facility]  Pager: [phone]

## 2024-01-28 NOTE — PLAN OF CARE
For vital signs and complete assessments, please see documentation flowsheets.     [id number]  Pertinent assessments: Pt A&Ox4.Mozambican speaking, needs  with cares. Ind in room. On RA. Afebrile. VSS.Denies nausea & SOB. C/O pain given prn IV dilaudid x2. PIV infusing LR @ 100mls/hr.    Major Shift Events: none    Treatment Plan: NPO. Symptom management. IVF. IV abx. Pain control. Colorectal following    Bedside Nurse: Arvind Nowak RN

## 2024-01-29 ENCOUNTER — APPOINTMENT (OUTPATIENT)
Dept: CT IMAGING | Facility: CLINIC | Age: 39
DRG: 330 | End: 2024-01-29
Attending: PHYSICIAN ASSISTANT

## 2024-01-29 LAB
ERYTHROCYTE [DISTWIDTH] IN BLOOD BY AUTOMATED COUNT: 12.7 % (ref 10–15)
HCT VFR BLD AUTO: 39.8 % (ref 40–53)
HGB BLD-MCNC: 13.5 G/DL (ref 13.3–17.7)
MCH RBC QN AUTO: 31 PG (ref 26.5–33)
MCHC RBC AUTO-ENTMCNC: 33.9 G/DL (ref 31.5–36.5)
MCV RBC AUTO: 92 FL (ref 78–100)
PLATELET # BLD AUTO: 242 10E3/UL (ref 150–450)
RBC # BLD AUTO: 4.35 10E6/UL (ref 4.4–5.9)
WBC # BLD AUTO: 8.5 10E3/UL (ref 4–11)

## 2024-01-29 PROCEDURE — 258N000003 HC RX IP 258 OP 636: Performed by: STUDENT IN AN ORGANIZED HEALTH CARE EDUCATION/TRAINING PROGRAM

## 2024-01-29 PROCEDURE — 272N000431 CT RETROPERITONEAL ABSCESS DRAIN W CATH PLACE

## 2024-01-29 PROCEDURE — 250N000011 HC RX IP 250 OP 636: Mod: JZ | Performed by: INTERNAL MEDICINE

## 2024-01-29 PROCEDURE — 250N000011 HC RX IP 250 OP 636: Performed by: NURSE PRACTITIONER

## 2024-01-29 PROCEDURE — 999N000199 HC STATISTIC WOC PT EDUCATION, 31-45 MIN

## 2024-01-29 PROCEDURE — 250N000009 HC RX 250: Performed by: NURSE PRACTITIONER

## 2024-01-29 PROCEDURE — 87075 CULTR BACTERIA EXCEPT BLOOD: CPT | Performed by: PHYSICIAN ASSISTANT

## 2024-01-29 PROCEDURE — 99232 SBSQ HOSP IP/OBS MODERATE 35: CPT | Performed by: INTERNAL MEDICINE

## 2024-01-29 PROCEDURE — 250N000011 HC RX IP 250 OP 636: Performed by: INTERNAL MEDICINE

## 2024-01-29 PROCEDURE — 250N000013 HC RX MED GY IP 250 OP 250 PS 637: Performed by: INTERNAL MEDICINE

## 2024-01-29 PROCEDURE — 120N000001 HC R&B MED SURG/OB

## 2024-01-29 PROCEDURE — 36415 COLL VENOUS BLD VENIPUNCTURE: CPT | Performed by: INTERNAL MEDICINE

## 2024-01-29 PROCEDURE — 85027 COMPLETE CBC AUTOMATED: CPT | Performed by: INTERNAL MEDICINE

## 2024-01-29 PROCEDURE — 87070 CULTURE OTHR SPECIMN AEROBIC: CPT | Performed by: PHYSICIAN ASSISTANT

## 2024-01-29 RX ORDER — NALOXONE HYDROCHLORIDE 0.4 MG/ML
0.4 INJECTION, SOLUTION INTRAMUSCULAR; INTRAVENOUS; SUBCUTANEOUS
Status: DISCONTINUED | OUTPATIENT
Start: 2024-01-29 | End: 2024-01-29

## 2024-01-29 RX ORDER — FLUMAZENIL 0.1 MG/ML
0.2 INJECTION, SOLUTION INTRAVENOUS
Status: DISCONTINUED | OUTPATIENT
Start: 2024-01-29 | End: 2024-01-29

## 2024-01-29 RX ORDER — NALOXONE HYDROCHLORIDE 0.4 MG/ML
0.2 INJECTION, SOLUTION INTRAMUSCULAR; INTRAVENOUS; SUBCUTANEOUS
Status: DISCONTINUED | OUTPATIENT
Start: 2024-01-29 | End: 2024-01-29

## 2024-01-29 RX ORDER — FENTANYL CITRATE 50 UG/ML
25-50 INJECTION, SOLUTION INTRAMUSCULAR; INTRAVENOUS EVERY 5 MIN PRN
Status: DISCONTINUED | OUTPATIENT
Start: 2024-01-29 | End: 2024-01-29

## 2024-01-29 RX ADMIN — SODIUM CHLORIDE, POTASSIUM CHLORIDE, SODIUM LACTATE AND CALCIUM CHLORIDE: 600; 310; 30; 20 INJECTION, SOLUTION INTRAVENOUS at 21:54

## 2024-01-29 RX ADMIN — METRONIDAZOLE 500 MG: 500 INJECTION, SOLUTION INTRAVENOUS at 21:56

## 2024-01-29 RX ADMIN — OXYCODONE HYDROCHLORIDE 5 MG: 5 TABLET ORAL at 16:00

## 2024-01-29 RX ADMIN — FENTANYL CITRATE 50 MCG: 0.05 INJECTION, SOLUTION INTRAMUSCULAR; INTRAVENOUS at 13:33

## 2024-01-29 RX ADMIN — CIPROFLOXACIN 400 MG: 400 INJECTION, SOLUTION INTRAVENOUS at 07:40

## 2024-01-29 RX ADMIN — FENTANYL CITRATE 50 MCG: 0.05 INJECTION, SOLUTION INTRAMUSCULAR; INTRAVENOUS at 13:40

## 2024-01-29 RX ADMIN — ONDANSETRON 4 MG: 4 TABLET, ORALLY DISINTEGRATING ORAL at 07:43

## 2024-01-29 RX ADMIN — METRONIDAZOLE 500 MG: 500 INJECTION, SOLUTION INTRAVENOUS at 06:03

## 2024-01-29 RX ADMIN — SODIUM CHLORIDE, POTASSIUM CHLORIDE, SODIUM LACTATE AND CALCIUM CHLORIDE: 600; 310; 30; 20 INJECTION, SOLUTION INTRAVENOUS at 06:03

## 2024-01-29 RX ADMIN — METRONIDAZOLE 500 MG: 500 INJECTION, SOLUTION INTRAVENOUS at 14:20

## 2024-01-29 RX ADMIN — CIPROFLOXACIN 400 MG: 400 INJECTION, SOLUTION INTRAVENOUS at 20:30

## 2024-01-29 RX ADMIN — MIDAZOLAM HYDROCHLORIDE 1 MG: 1 INJECTION, SOLUTION INTRAMUSCULAR; INTRAVENOUS at 13:33

## 2024-01-29 RX ADMIN — MIDAZOLAM HYDROCHLORIDE 1 MG: 1 INJECTION, SOLUTION INTRAMUSCULAR; INTRAVENOUS at 13:40

## 2024-01-29 RX ADMIN — ACETAMINOPHEN 650 MG: 325 TABLET, FILM COATED ORAL at 20:57

## 2024-01-29 RX ADMIN — LIDOCAINE HYDROCHLORIDE 20 ML: 10 INJECTION, SOLUTION EPIDURAL; INFILTRATION; INTRACAUDAL; PERINEURAL at 13:35

## 2024-01-29 RX ADMIN — OXYCODONE HYDROCHLORIDE 5 MG: 5 TABLET ORAL at 06:03

## 2024-01-29 ASSESSMENT — ACTIVITIES OF DAILY LIVING (ADL)
ADLS_ACUITY_SCORE: 18

## 2024-01-29 NOTE — PLAN OF CARE
Goal Outcome Evaluation:     Pertinent assessments: Pt A&Ox4. Yi speaking, needs  with cares. Ind in room. VSS, On RA. LS clear. BS active. Denies SOB. C/O pain, Heat applied. C/o nausea, zofran given once. PIV infusing LR @ 75 ml/hr.     Major Shift Events: drain placement to left lower abdomen.    Treatment Plan: Symptom management. IVF. IV abx. Pain control. Colorectal following. Advance diet as tolerated. Surgery Wednesday or Thursday.     Bedside Nurse: Lois Avila RN

## 2024-01-29 NOTE — PROGRESS NOTES
10 Indonesian pigtail drain placed in lower abdominal abscess. Sutured in place and secured with Stay fix dressing. Sample obtained and sent to lab.   Per Dr. Arriaga, flush drain with 5mls saline every 8 hours.   Pt tolerated procedure. Received 2 mg versed, 100mcg fentanyl IV. VSS.  Report called to RN. Pt transferred back to room via cart.

## 2024-01-29 NOTE — PLAN OF CARE
Goal Outcome Evaluation:       Pertinent assessments: Pt A&Ox4.Stateless speaking, needs  with cares. Ind in room. VSS, On RA. LS clear. BS active. Denies nausea & SOB. C/O pain, PRN tylenol and oxy given once. PIV infusing LR @ 75 ml/hr.     Major Shift Events: advanced to clear liquid diet.     Treatment Plan: Symptom management. IVF. IV abx. Pain control. Colorectal following. Advance diet.     Bedside Nurse: Lois Avila RN

## 2024-01-29 NOTE — CONSULTS
Patient is on CT schedule today Monday 1/29/24 for a CT guided diverticular abscess aspiration versus drain placement with IV moderate sedation.     Marko is an 38-year-old Syrian-speaking only male with diverticulitis with second admission.  He remains on IV antibiotics and his white count is coming down.  His pain persists and has not had bowel function.      -Labs WNL for procedure.    -Orders for NPO have been entered.   -Consent will be done prior to procedure.     Please contact the CT department for procedural related questions.     Reviewed and d/w IR Dr Arriaga and Mikayla Radiology RN.     Total time: 25 minutes     Thanks, Lyly [facility] CNP ([phone]) (phone [phone])

## 2024-01-29 NOTE — PROGRESS NOTES
COLON & RECTAL SURGERY  PROGRESS NOTE    January 29, 2024    SUBJECTIVE:  Pt had some pain this am which improved with oxycodone. Also reports nausea new this morning. Small formed BM at 5am. Denies blood. WBC normalized to 8.5. AVSS.    OBJECTIVE:  Temp:  [98.6  F (37  C)-98.9  F (37.2  C)] 98.9  F (37.2  C)  Pulse:  [57-68] 68  Resp:  [20] 20  BP: (109-112)/(51-59) 112/52  SpO2:  [98 %-100 %] 98 %    Intake/Output Summary (Last 24 hours) at 1/29/2024 0844  Last data filed at 1/28/2024 1758  Gross per 24 hour   Intake 350 ml   Output --   Net 350 ml       GENERAL:  Awake, alert, no acute distress, lying in bed  HEAD: Nomocephalic atraumatic  SCLERA: anicteric  EXTREMITIES: warm and well perfused  ABDOMEN:  Soft, mild tenderness in the lower left quadrant, non-distended, no rebound or guarding, no peritoneal signs    LABS:  Lab Results   Component Value Date    WBC 8.5 01/29/2024     Lab Results   Component Value Date    HGB 13.5 01/29/2024     Lab Results   Component Value Date    HCT 39.8 01/29/2024     Lab Results   Component Value Date     01/29/2024     Last Basic Metabolic Panel:  Lab Results   Component Value Date     01/27/2024      Lab Results   Component Value Date    POTASSIUM 3.7 01/27/2024     Lab Results   Component Value Date    CHLORIDE 100 01/27/2024     Lab Results   Component Value Date    SANTOS 8.7 01/27/2024     Lab Results   Component Value Date    CO2 26 01/27/2024     Lab Results   Component Value Date    BUN 5.7 01/27/2024     Lab Results   Component Value Date    CR 0.72 01/27/2024     Lab Results   Component Value Date    GLC 93 01/27/2024       ASSESSMENT/PLAN: Marko is a 38-year-old Hungarian speaking male admitted on 1/27/24 with recurrent diverticulitis.     -NPO for IR consult and possible drain placement, okay to resume clears afterward  -Continue IV antibiotics  -Pain management, minimize narcotics  -WOCN for stoma marking  -Discussed the possibility of needing surgery  this admission due to ongoing LLQ. Discussed that this could entail a temporary stoma. Will plan to have stoma marking today.        For questions/paging, please contact the CRS office at [phone].    Zelalem Wilde PA-C  [facility]  Phone: [phone]     Colon and Rectal Surgery Attending Note    Patient seen and examined independently.  Agree with above assessment and plan.  Marko is a 38-year-old who was admitted for perforated diverticulitis for several days and was discharged home to complete a 14-day course on 14 January.  Completed this last Saturday and then began having abdominal pain that progressed prompting admission on Friday.  CT scan showed persistent large extraluminal air in the mesentery with some fluid.  No signs of obstruction.    He has been admitted on IV antibiotics.  His pain is a bit better today.  He does have waves of pain with bowel movements.    Abdomen: Soft, focal left lower quadrant tenderness without rebound or guarding.    Plan: This is his second admission within the month for perforated sigmoid diverticulitis.  IR drain was placed today.  Will likely need surgery, whether that later this admission or he discharges home on antibiotics and returns next week for surgery.  We discussed that surgery may involve removing the sigmoid colon with a primary anastomosis versus colostomy or ileostomy.  We discussed the risk of bleeding, infection, alteration of bowel function, as well as other risks including damage surrounding structures, DVT, PE, heart attack, stroke, other cardiopulmonary events, and even death.  We discussed that the option for ongoing antibiotics could be considered however this large pocket of air is unlikely to resolve on its own, even with the drain as it likely represents a fistula.  He understood and was okay with proceeding with surgery.  We are working on trying to find time for this.    In the meantime he can be on clear liquids.     Continue the IV antibiotics  Continue IR drain  Encourage ambulation    Sandy Dale MD  [facility]  [address], [address]  [place] [address]  T: [phone]  F: [phone]   [email or web address]

## 2024-01-29 NOTE — PROGRESS NOTES
[facility]    Medicine Progress Note - Hospitalist Service    Date of Admission:  1/26/2024    Assessment & Plan     Marko Pastor is a 38 year old male who is Irish speaking without significant past medical history other than diverticulosis and diverticulitis. He was just hospitalized and discharged 1/14/2024 for a stay for with diverticulitis and intra-abdominal abscess that was not amenable to drainage.  He was treated with Zosyn and transition to 10 days of Augmentin which he finished at home on 1/21/2025.  He did well for 3 or so days but developed worsening abdominal pain again.  He presented to the ED on 1/26/2024 for reevaluation.  ED evaluation showed stable vital signs.  His white blood count was up at over 12, hemoglobin is 14.3, and platelets 259.  His lactic acid was normal. CT scan of the abdomen was unchanged from his previous showing a contained perforation from his diverticulitis.  He was started on Cipro and Flagyl and admitted for further treatment of diverticulitis with contained perforation.  Colorectal surgery was consulted.  He was treated conservatively.  On 1/29/2024 colorectal surgery and interventional radiology placed a drain in the abdominal abscess.     Plans today:  -IR placed drain today  -Continue Cipro and Flagyl  -Clear liquid diet  -Pain medicine as needed     Problem list:    Diverticulitis with contained perforation  Abdomen pain, nausea, and vomiting  Status post abscess drain placement by interventional radiology 1/29/2024  -Colorectal surgery and IR consults appreciated  -IR placed a drain today and abscess  -Monitor drain output  -Continue ciprofloxacin and Flagyl  -Resume clear liquid diet  -Reassess tomorrow     Constipation  -Bowel meds as needed        Diet: Clear Liquid Diet    DVT Prophylaxis: Pneumatic Compression Devices  Collins Catheter: Not present  Lines: None     Cardiac Monitoring: None  Code Status: Full Code      Clinically  "Significant Risk Factors                         # Overweight: Estimated body mass index is 29.12 kg/m  as calculated from the following:    Height as of 1/12/24: 1.72 m (5' 7.72\").    Weight as of this encounter: 86.1 kg (189 lb 14.4 oz)., PRESENT ON ADMISSION            Disposition Plan      Expected Discharge Date: 02/02/2024,  3:00 PM                  Eagle Perez MD  Hospitalist Service  [facility]  Securely message with [employer] (more info)  Text page via [employer] Paging/Directory   ______________________________________________________________________    Interval History   Still has some lower mid and left-sided abdominal pain    Physical Exam   Vital Signs: Temp: 98.7  F (37.1  C) Temp src: Oral BP: 114/58 Pulse: 57   Resp: 18 SpO2: 99 % O2 Device: None (Room air) Oxygen Delivery: 2 LPM  Weight: 189 lbs 14.4 oz    GENERAL:  Comfortable. Cooperative.  PSYCH: pleasant, oriented, No acute distress.  EYES: PERRLA, Normal conjunctiva.  HEART:  Regular rate and rhythm. No JVD. Pulses normal. No edema.  LUNGS:  Clear to auscultation, normal Respiratory effort.  ABDOMEN:  Soft, no hepatosplenomegaly, normal bowel sounds.  Left lower quadrant tenderness with palpation  EXTREMETIES: No clubbing, cyanosis or ischemia  SKIN:  Dry to touch, No rash.      Medical Decision Making       45 MINUTES SPENT BY ME on the date of service doing chart review, history, exam, documentation & further activities per the note.      Data     I have personally reviewed the following data over the past 24 hrs:    8.5  \   13.5   / 242     N/A N/A N/A /  N/A   N/A N/A N/A \       Imaging results reviewed over the past 24 hrs:   Recent Results (from the past 24 hour(s))   CT Retroperitoneal Abscess Drainage    Narrative    CT RETROPERITONEAL ABSCESS DRAIN WITH CATHETER PLACEMENT   1/29/2024  1:59 PM    HISTORY: 38-year-old patient with history of diverticulitis and  abscess. Patient had initial abscess identified on " January 7, 2024.  Patient has been on antibiotics and abscess cavity persisted on  January 12, 2024 and increased by January 26, 2024. Therefore, request  made for drain placement, if able.    TECHNIQUE: Patient was brought to the CT department and informed  consent was obtained with an . Radiation dose for this scan  was reduced using automated exposure control, adjustment of the mA  and/or kV according to patient size, or iterative reconstruction  technique.. Patient was placed in a supine position and skin overlying  the lower abdomen was prepped and draped in standard sterile fashion.  1% lidocaine was used for local anesthetic. With CT guidance, a [email or web address]  needle was advanced into the collection. A superstiff Amplatz wire was  placed and after serial dilatation, a 10 Cypriot drain was placed.  Approximately 5 mL of bloody fluid was aspirated and sent to the lab  for evaluation. Majority of the collection is air. Patient tolerated  the procedure relatively well. The catheter was sutured in position  and placed to bulb suction for the time being. 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    Sedation: A moderate level of sedation was achieved with 2 mg IV  Versed and 100 mcg IV fentanyl.  Sedation time: 25 minutes  Please note the above medications were administered by the radiology  nursing staff under my direct supervision. Patient's vital signs were  monitored and remained stable throughout the procedure.  Contrast: None  Local anesthetic: 20 mL of 1% lidocaine    FINDINGS: A total of 9 noncontrast CT images were obtained through the  lower abdomen/pelvis during placement of catheter, wire, and drainage  catheter by completion. Drainage catheter in good position by  completion, adjacent to sigmoid colon, which is thickened.      Impression    IMPRESSION: Successful placement of 10 Cypriot drainage catheter into  collection adjacent to the sigmoid colon.  Majority of collection is  air and 5 mL of bloody fluid aspirated and sent to the lab for  evaluation. 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 However, we will begin treatment with flushing the  drainage catheter with 10 mL of normal saline 3 times daily as well as  bulb suction. Suspect this can eventually be tailored to no fluid  flushing and gravity drainage.      Recent Labs   Lab 01/29/24  0715 01/28/24  0725 01/27/24  0723 01/26/24  1601   WBC 8.5 11.8* 13.7* 12.8*   HGB 13.5 13.0* 13.5 14.3   MCV 92 92 92 93    220 223 259   NA  --   --  136 138   POTASSIUM  --   --  3.7 4.1   CHLORIDE  --   --  100 100   CO2  --   --  26 27   BUN  --   --  5.7* 7.1   CR  --   --  0.72 0.76   ANIONGAP  --   --  10 11   SANTOS  --   --  8.7 8.8   GLC  --   --  93 95   ALBUMIN  --   --   --  4.2   PROTTOTAL  --   --   --  8.0   BILITOTAL  --   --   --  0.5   ALKPHOS  --   --   --  65   ALT  --   --   --  22   AST  --   --   --  18

## 2024-01-29 NOTE — PLAN OF CARE
Pt A&Ox4.Moroccan speaking, needs  with cares. Ind in room. VSS, On RA. LS clear. BS active. Denies nausea & SOB. C/O pain, PRN tylenol and oxycodone. Heat applied.  PIV infusing LR @ 75 ml/hr. Flagyl/Cipro IV. Colorectal following.     Major Shift Events:   Oxycodone x2/12 hour shift. Otherwise Uneventful night, slept well. No significant change in condition. Diverticular Krames handout given to patient with family/friends present.     Treatment Plan:   Symptom management. IVF. IV abx. Pain control. Colorectal following. Advance diet as tolerated.

## 2024-01-29 NOTE — IR NOTE
[facility]  POST PROCEDURE NOTE      Procedure: Lower Abdominal 10 Fr drain.  Collection is mostly air, therefore, only about 5 ml of bloody fluid collected and sent to lab for evaluation.  No apparent complication.  Will flush with 10 ml of NS for now, though strong suspicion of fistula given air in collection.  Also, will maintain suction for the time being.    Physician: Bessie    Type of Sedation: Sedation    Estimated Blood Loss: 5 ml      Complications/Reactions: None    Plan: 10 Fr drain in lower abdomen.  Routine care/maintenance.      Mustapha Arriaga MD   1/29/2024, 3:27 PM

## 2024-01-29 NOTE — CONSULTS
[facility]  WO Nurse Inpatient Assessment     Consulted for: Stoma marking for potential colostomy    Patient History (according to provider note(s):      Marko Pastor is a 38 year old male who is South Korean speaking with no appreciable past medical history other than diverticulosis was just hospitalized and discharged 1/14/2024 for a stay for with diverticulitis and intra-abdominal abscess that was not amenable to drainage.  Patient at that time was treated with Zosyn and transition to 10 days of Augmentin which she finished at home on 1/21/2025.  He did well for 3 or so days but yesterday developed some abdominal pain again.  He took some oxycodone that he had left over and then again today the pain actually got worse.  The pain is worse with having bowel movements.  Denies any bright red blood per rectum or melena.  Has had associated nausea and vomiting with this.  The pain is across his lower abdomen more so on the left side than the right however.  In the emergency room patient was found to be afebrile with normal blood pressures.  His white blood count was up at over 12, hemoglobin is 14.3, platelets 259.  His lactic acid was normal with a negative anion gap of only 11.  Creatinine was 0.76 and his electrolytes were all normal.  He underwent another CT scan of the abdomen that really is unchanged from his previous showing a contained perforation from his diverticulitis.       Assessment:      Areas visualized during today's visit: Focused:    PRE-OP OSTOMY CONSULTATION AND ASSESSMENT    Type of Stoma Planned: Potential Colostomy    Diagnosis Pertinent to Stoma: Diverticulitis  Surgery Date: Potentially 1/31  Surgeon: Unknown Hospital: [facility]    EDUCATION   Present for Teaching Session: Patient and Family Member   Present: Yes    Teaching Folder Given: Yes  Instruction: [facility] Role, Activity, Anatomy, Bathing: Ostomy supplies are water proof, Clothing, Diet, Exercise,  Fluids: Drink 6-8 glasses of non-caffeinated fluids daily, Hospital Stay: commonly 3-5 days after surgery, and Pouching Products: Showed pouching system    Significant Vision Issues: No  Hand Dexterity Issues: No    Pertinent Information/Identified Barriers to Independent Care: 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      STOMA SITE MARKING  Assessed Patient while: Lying, Sitting, and Standing  Abdomen assessed for site by: Patient's ability to visiualize area, avoidance of skin creases and scars, palpating for rectus abdominus muscle, and clothing fit  Marked in LLQ and RLQ with: Permanent Marker and Covered with Tegaderm    Total Time Spent with Patient: 35 minutes       Treatment Plan:       RECOMMEND PRIMARY TEAM ORDER: None, at this time  Education provided: plan of care  Discussed plan of care with: Patient, Family, and Nurse  WOC nurse follow-up plan:  Later this week if patient requires colostomy  Notify WOC if wound(s) deteriorate.  Nursing to notify the Provider(s) and re-consult the WOC Nurse if new skin concern.    DATA:     Current support surface: Standard  Standard gel/foam mattress (IsoFlex, Atmos air, etc)  Containment of urine/stool: Continent of bladder and Continent of bowel  BMI: Body mass index is 29.12 kg/m .   Active diet order: Orders Placed This Encounter      NPO per Anesthesia Guidelines for Procedure/Surgery Except for: Meds     Output: I/O last 3 completed shifts:  In: 350 [P.O.:350]  Out: -      Labs:   Recent Labs   Lab 01/29/24  0715 01/27/24  0723 01/26/24  1601   ALBUMIN  --   --  4.2   HGB 13.5   < > 14.3   WBC 8.5   < > 12.8*    < > = values in this interval not displayed.     Pressure injury risk assessment:   Sensory Perception: 4-->no impairment  Moisture: 4-->rarely moist  Activity: 3-->walks occasionally  Mobility: 4-->no limitation  Nutrition: 3-->adequate  Friction and Shear: 3-->no apparent problem  Grant Score: 21    JADEN [facility]  [facility]  Dept. Office Number: [phone]

## 2024-01-30 PROCEDURE — 250N000011 HC RX IP 250 OP 636: Performed by: INTERNAL MEDICINE

## 2024-01-30 PROCEDURE — 250N000013 HC RX MED GY IP 250 OP 250 PS 637: Performed by: PHYSICIAN ASSISTANT

## 2024-01-30 PROCEDURE — 0W9H30Z DRAINAGE OF RETROPERITONEUM WITH DRAINAGE DEVICE, PERCUTANEOUS APPROACH: ICD-10-PCS | Performed by: RADIOLOGY

## 2024-01-30 PROCEDURE — 120N000001 HC R&B MED SURG/OB

## 2024-01-30 PROCEDURE — 250N000013 HC RX MED GY IP 250 OP 250 PS 637: Performed by: INTERNAL MEDICINE

## 2024-01-30 PROCEDURE — 258N000003 HC RX IP 258 OP 636: Performed by: STUDENT IN AN ORGANIZED HEALTH CARE EDUCATION/TRAINING PROGRAM

## 2024-01-30 PROCEDURE — 99232 SBSQ HOSP IP/OBS MODERATE 35: CPT | Performed by: INTERNAL MEDICINE

## 2024-01-30 RX ORDER — NEOMYCIN SULFATE 500 MG/1
1000 TABLET ORAL ONCE
Status: DISCONTINUED | OUTPATIENT
Start: 2024-01-30 | End: 2024-01-30 | Stop reason: ALTCHOICE

## 2024-01-30 RX ORDER — POLYETHYLENE GLYCOL 3350 17 G/17G
238 POWDER, FOR SOLUTION ORAL ONCE
Status: COMPLETED | OUTPATIENT
Start: 2024-01-30 | End: 2024-01-30

## 2024-01-30 RX ADMIN — SODIUM CHLORIDE, POTASSIUM CHLORIDE, SODIUM LACTATE AND CALCIUM CHLORIDE: 600; 310; 30; 20 INJECTION, SOLUTION INTRAVENOUS at 11:50

## 2024-01-30 RX ADMIN — POLYETHYLENE GLYCOL 3350 238 G: 17 POWDER, FOR SOLUTION ORAL at 11:50

## 2024-01-30 RX ADMIN — METRONIDAZOLE 500 MG: 500 INJECTION, SOLUTION INTRAVENOUS at 13:52

## 2024-01-30 RX ADMIN — CIPROFLOXACIN 400 MG: 400 INJECTION, SOLUTION INTRAVENOUS at 19:59

## 2024-01-30 RX ADMIN — OXYCODONE HYDROCHLORIDE 5 MG: 5 TABLET ORAL at 16:18

## 2024-01-30 RX ADMIN — METRONIDAZOLE 500 MG: 500 INJECTION, SOLUTION INTRAVENOUS at 05:47

## 2024-01-30 RX ADMIN — METRONIDAZOLE 500 MG: 500 INJECTION, SOLUTION INTRAVENOUS at 21:38

## 2024-01-30 RX ADMIN — ACETAMINOPHEN 650 MG: 325 TABLET, FILM COATED ORAL at 08:03

## 2024-01-30 RX ADMIN — CIPROFLOXACIN 400 MG: 400 INJECTION, SOLUTION INTRAVENOUS at 07:55

## 2024-01-30 RX ADMIN — HYDROMORPHONE HYDROCHLORIDE 0.2 MG: 0.2 INJECTION, SOLUTION INTRAMUSCULAR; INTRAVENOUS; SUBCUTANEOUS at 21:36

## 2024-01-30 RX ADMIN — OXYCODONE HYDROCHLORIDE 5 MG: 5 TABLET ORAL at 04:24

## 2024-01-30 ASSESSMENT — ACTIVITIES OF DAILY LIVING (ADL)
ADLS_ACUITY_SCORE: 18

## 2024-01-30 NOTE — PROGRESS NOTES
COLON & RECTAL SURGERY  PROGRESS NOTE    January 30, 2024    SUBJECTIVE:  Pain is a bit improved, but still having pain with walking and using the bathroom. Tolerating clears. Denies nausea or vomiting. Not passing gas, no BM since yesterday. EUN with 30 ml output. No new labs. AVSS.    OBJECTIVE:  Temp:  [98.5  F (36.9  C)-98.7  F (37.1  C)] 98.6  F (37  C)  Pulse:  [52-59] 58  Resp:  [16-18] 16  BP: (102-127)/(40-67) 107/40  SpO2:  [98 %-100 %] 99 %    Intake/Output Summary (Last 24 hours) at 1/30/2024 0846  Last data filed at 1/30/2024 0044  Gross per 24 hour   Intake 960 ml   Output 35 ml   Net 925 ml       GENERAL:  Awake, alert, no acute distress, lying in bed  HEAD: Nomocephalic atraumatic  SCLERA: anicteric  EXTREMITIES: warm and well perfused  ABDOMEN:  Soft, focal tenderness in LLQ, non-distended, no rebound or guarding, no peritoneal signs. EUN drain intact with bloody output.      LABS:  Lab Results   Component Value Date    WBC 8.5 01/29/2024     Lab Results   Component Value Date    HGB 13.5 01/29/2024     Lab Results   Component Value Date    HCT 39.8 01/29/2024     Lab Results   Component Value Date     01/29/2024     Last Basic Metabolic Panel:  Lab Results   Component Value Date     01/27/2024      Lab Results   Component Value Date    POTASSIUM 3.7 01/27/2024     Lab Results   Component Value Date    CHLORIDE 100 01/27/2024     Lab Results   Component Value Date    SANTOS 8.7 01/27/2024     Lab Results   Component Value Date    CO2 26 01/27/2024     Lab Results   Component Value Date    BUN 5.7 01/27/2024     Lab Results   Component Value Date    CR 0.72 01/27/2024     Lab Results   Component Value Date    GLC 93 01/27/2024       ASSESSMENT/PLAN: Marko Pastor is a 38-year-old Croatian speaking male re-admitted on 1/27/24 with perforated diverticulitis. IR drain was placed yesterday.    -Continue drain  -Continue antibiotics  -Plan for surgery tomorrow  -Bowel prep today  -Encourage  ambulation  -Pain management      For questions/paging, please contact the CRS office at [phone].    Zelalem Wilde PA-C  [facility]  Phone: [phone]     Colon and Rectal Surgery Attending Note    Patient seen and examined independently.  Agree with above assessment and plan.  Marko is a 37-year-old whose admitted with perforated diverticulitis for the second time in 3 weeks.  IR drain was placed yesterday with initially minimal output.  Were planning for surgery tomorrow and a bowel prep was started and now he is having more considerable drain output that looks feculent.    He denies otherwise abdominal pain.  No fevers or chills.  He has been marked for a stoma.    Abdomen soft, mild suprapubic tenderness around the drain.  The drain itself is putting out copious thin green stool.    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  Will plan for a colonoscopy and a laparoscopic-assisted sigmoid colectomy with possible colostomy or ileostomy.  We reviewed the risk of bleeding, infection, need for further surgery, hernias, and other risks associated major abdominal surgery and general anesthesia including but not limited to DVT, PE, heart attack, stroke, other cardiopulmonary events, and even death.  He was in agreement to have blood transfusion if needed.    He had opportunity ask questions seem to understand and wished to proceed.    Sandy Dale MD  [facility]  [address], [address]  [place] [address]  T: [phone]  F: [phone]   [email or web address]

## 2024-01-30 NOTE — PLAN OF CARE
Goal Outcome Evaluation:     Pertinent assessments: Pt A&Ox4. On RA, VSS. Vietnamese speaking, needs  with cares. Ind in room. LS clear. BS active. Denies SOB. C/O pain, tylenol given. Denies n/v. Irrigated EUN drain, w/ bright bloody output 15 ml and 60 ml brown output after starting bowel prep. PIV infusing LR @ 75.    Major Shift Events: Bowel prep today.     Treatment Plan: Symptom management. IVF. IV abx. Pain control. Colorectal following. NPO @ mid night, Surgery tomorrow @ 1300.     Bedside Nurse: Lois Avila RN

## 2024-01-30 NOTE — PLAN OF CARE
Pertinent assessments: Pt A&Ox4. Armenian speaking, needs  with cares. Ind in room. VSS, On RA. LS clear. BS active. Denies SOB. C/O pain, oxy given x1. Denies n/v. Up ambulating hallways. Reports no gas, but small bms. Irrigated EUN drain, w/ bright bloody output 15 ml.    Major Shift Events: Uneventful     Treatment Plan: Symptom management. IVF. IV abx. Pain control. Colorectal following. Advance diet as tolerated. Surgery Wednesday or Thursday.     Bedside Nurse: Danyell Longo RN

## 2024-01-30 NOTE — PLAN OF CARE
Goal Outcome Evaluation:      Plan of Care Reviewed With: patient    Overall Patient Progress: improvingOverall Patient Progress: improving  Pertinent assessments: Assumed cares [id number]. Pt A&Ox4. On RA, VSS. Singaporean speaking, needs  with cares. Ind in room. LS clear. BS active. Denies SOB. C/O pain, tylenol  and oxy given x 1 pain. Denies n/v. Up ambulating hallways. No BM reported on this shift. Irrigated EUN drain, w/ bright bloody output 20 ml. PIV infusing LR @ 75.    Major Shift Events: Uneventful     Treatment Plan: Symptom management. IVF. IV abx. Pain control. Colorectal following. Advance diet as tolerated. Surgery Wednesday or Thursday.     Bedside Nurse: Brittney Mullins RN

## 2024-01-30 NOTE — PROGRESS NOTES
FYI: to Dr. Dale. Patient started bowel prep today @ 1200. Melchor output @1030 was 15 ml serosanguineous and @ 1430 60 ml brown output.

## 2024-01-30 NOTE — PROGRESS NOTES
[facility]    Medicine Progress Note - Hospitalist Service    Date of Admission:  1/26/2024    Assessment & Plan   Marko Pastor is a 38 year old male who is Belarusian speaking without significant past medical history other than diverticulosis and diverticulitis. He was just hospitalized and discharged 1/14/2024 for a stay for with diverticulitis and intra-abdominal abscess that was not amenable to drainage.  He was treated with Zosyn and transition to 10 days of Augmentin which he finished at home on 1/21/2025.  He did well for 3 or so days but developed worsening abdominal pain again.  He presented to the ED on 1/26/2024 for reevaluation.  ED evaluation showed stable vital signs.  His white blood count was up at over 12, hemoglobin is 14.3, and platelets 259.  His lactic acid was normal. CT scan of the abdomen was unchanged from his previous showing a contained perforation from his diverticulitis.  He was started on Cipro and Flagyl and admitted for further treatment of diverticulitis with contained perforation.  Colorectal surgery was consulted.  He was treated conservatively.  On 1/29/2024 colorectal surgery consulted interventional radiology who placed a drain in the abdominal abscess. On 1/30/24 colorectal surgery recommended colon prep for surgical treatment on 1/31/24.     Plans today:  -colon prep for surgery tomorrow  -Continue Cipro and Flagyl     Problem list:    Diverticulitis with contained perforation  Abdomen pain, nausea, and vomiting  Status post abscess drain placement by interventional radiology 1/29/2024  -Colorectal surgery and IR consults appreciated  -IR placed a drain on 1/29 in abscess  -Monitor drain output  -Continue ciprofloxacin and Flagyl  -CRS is planning on operating tomorrow. Colon prep tonight.      Constipation  -Getting colon prep tonight          Diet: Clear Liquid Diet    DVT Prophylaxis: Pneumatic Compression Devices, consider anticoagulation  "after surgery  Collins Catheter: Not present  Lines: None     Cardiac Monitoring: None  Code Status: Full Code      Clinically Significant Risk Factors                         # Overweight: Estimated body mass index is 29.12 kg/m  as calculated from the following:    Height as of 1/12/24: 1.72 m (5' 7.72\").    Weight as of this encounter: 86.1 kg (189 lb 14.4 oz)., PRESENT ON ADMISSION            Disposition Plan      Expected Discharge Date: 02/02/2024,  3:00 PM                  Eagle Perez MD  Hospitalist Service  [facility]  Securely message with [employer] (more info)  Text page via [facility] Paging/Directory   ______________________________________________________________________    Interval History   No new problems. Still with lower mid and left abdominal pain to palpation.     Physical Exam   Vital Signs: Temp: 98.6  F (37  C) Temp src: Oral BP: 107/40 Pulse: 58   Resp: 16 SpO2: 99 % O2 Device: None (Room air) Oxygen Delivery: 2 LPM  Weight: 189 lbs 14.4 oz    GENERAL:  Comfortable. Cooperative.  PSYCH: pleasant, oriented, No acute distress.  EYES: PERRLA, Normal conjunctiva.  HEART:  Regular rate and rhythm. No JVD. Pulses normal. No edema.  LUNGS:  Clear to auscultation, normal Respiratory effort.  ABDOMEN:  Soft, no hepatosplenomegaly, normal bowel sounds.  EXTREMETIES: No clubbing, cyanosis or ischemia  SKIN:  Dry to touch, No rash.      Medical Decision Making       40 MINUTES SPENT BY ME on the date of service doing chart review, history, exam, documentation & further activities per the note.      Data         Imaging results reviewed over the past 24 hrs:   Recent Results (from the past 24 hour(s))   CT Retroperitoneal Abscess Drainage    Narrative    CT RETROPERITONEAL ABSCESS DRAIN WITH CATHETER PLACEMENT   1/29/2024  1:59 PM    HISTORY: 38-year-old patient with history of diverticulitis and  abscess. Patient had initial abscess identified on January 7, 2024.  Patient has been on " antibiotics and abscess cavity persisted on  January 12, 2024 and increased by January 26, 2024. Therefore, request  made for drain placement, if able.    TECHNIQUE: Patient was brought to the CT department and informed  consent was obtained with an . Radiation dose for this scan  was reduced using automated exposure control, adjustment of the mA  and/or kV according to patient size, or iterative reconstruction  technique.. Patient was placed in a supine position and skin overlying  the lower abdomen was prepped and draped in standard sterile fashion.  1% lidocaine was used for local anesthetic. With CT guidance, a StudyTubeeh  needle was advanced into the collection. A superstiff Amplatz wire was  placed and after serial dilatation, a 10 Indonesian drain was placed.  Approximately 5 mL of bloody fluid was aspirated and sent to the lab  for evaluation. Majority of the collection is air. Patient tolerated  the procedure relatively well. The catheter was sutured in position  and placed to bulb suction for the time being. 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    Sedation: A moderate level of sedation was achieved with 2 mg IV  Versed and 100 mcg IV fentanyl.  Sedation time: 25 minutes  Please note the above medications were administered by the radiology  nursing staff under my direct supervision. Patient's vital signs were  monitored and remained stable throughout the procedure.  Contrast: None  Local anesthetic: 20 mL of 1% lidocaine    FINDINGS: A total of 9 noncontrast CT images were obtained through the  lower abdomen/pelvis during placement of catheter, wire, and drainage  catheter by completion. Drainage catheter in good position by  completion, adjacent to sigmoid colon, which is thickened.      Impression    IMPRESSION: Successful placement of 10 Indonesian drainage catheter into  collection adjacent to the sigmoid colon. Majority of collection is  air and 5 mL of  bloody fluid aspirated and sent to the lab for  evaluation. 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 However, we will begin treatment with flushing the  drainage catheter with 10 mL of normal saline 3 times daily as well as  bulb suction. Suspect this can eventually be tailored to no fluid  flushing and gravity drainage.     MARIO FOSTER MD         SYSTEM ID:  [id number]     Recent Labs   Lab 01/29/24  0715 01/28/24  0725 01/27/24  0723 01/26/24  1601   WBC 8.5 11.8* 13.7* 12.8*   HGB 13.5 13.0* 13.5 14.3   MCV 92 92 92 93    220 223 259   NA  --   --  136 138   POTASSIUM  --   --  3.7 4.1   CHLORIDE  --   --  100 100   CO2  --   --  26 27   BUN  --   --  5.7* 7.1   CR  --   --  0.72 0.76   ANIONGAP  --   --  10 11   SANTOS  --   --  8.7 8.8   GLC  --   --  93 95   ALBUMIN  --   --   --  4.2   PROTTOTAL  --   --   --  8.0   BILITOTAL  --   --   --  0.5   ALKPHOS  --   --   --  65   ALT  --   --   --  22   AST  --   --   --  18

## 2024-01-31 ENCOUNTER — ANESTHESIA EVENT (OUTPATIENT)
Dept: SURGERY | Facility: CLINIC | Age: 39
DRG: 330 | End: 2024-01-31

## 2024-01-31 ENCOUNTER — ANESTHESIA (OUTPATIENT)
Dept: SURGERY | Facility: CLINIC | Age: 39
DRG: 330 | End: 2024-01-31

## 2024-01-31 LAB
ABO/RH(D): NORMAL
ALBUMIN SERPL BCG-MCNC: 3.9 G/DL (ref 3.5–5.2)
ALP SERPL-CCNC: 60 U/L (ref 40–150)
ALT SERPL W P-5'-P-CCNC: 15 U/L (ref 0–70)
ANION GAP SERPL CALCULATED.3IONS-SCNC: 8 MMOL/L (ref 7–15)
ANTIBODY SCREEN: NEGATIVE
AST SERPL W P-5'-P-CCNC: 20 U/L (ref 0–45)
BACTERIA BLD CULT: NO GROWTH
BACTERIA BLD CULT: NO GROWTH
BILIRUB SERPL-MCNC: 0.3 MG/DL
BUN SERPL-MCNC: 3.1 MG/DL (ref 6–20)
CALCIUM SERPL-MCNC: 8.9 MG/DL (ref 8.6–10)
CHLORIDE SERPL-SCNC: 102 MMOL/L (ref 98–107)
CREAT SERPL-MCNC: 0.73 MG/DL (ref 0.67–1.17)
DEPRECATED HCO3 PLAS-SCNC: 28 MMOL/L (ref 22–29)
EGFRCR SERPLBLD CKD-EPI 2021: >90 ML/MIN/1.73M2
ERYTHROCYTE [DISTWIDTH] IN BLOOD BY AUTOMATED COUNT: 12.9 % (ref 10–15)
GLUCOSE SERPL-MCNC: 105 MG/DL (ref 70–99)
HCT VFR BLD AUTO: 40.6 % (ref 40–53)
HGB BLD-MCNC: 13.3 G/DL (ref 13.3–17.7)
INR PPP: 1.2 (ref 0.85–1.15)
MCH RBC QN AUTO: 30.3 PG (ref 26.5–33)
MCHC RBC AUTO-ENTMCNC: 32.8 G/DL (ref 31.5–36.5)
MCV RBC AUTO: 93 FL (ref 78–100)
PLATELET # BLD AUTO: 259 10E3/UL (ref 150–450)
POTASSIUM SERPL-SCNC: 3.8 MMOL/L (ref 3.4–5.3)
PROT SERPL-MCNC: 7.2 G/DL (ref 6.4–8.3)
RBC # BLD AUTO: 4.39 10E6/UL (ref 4.4–5.9)
SODIUM SERPL-SCNC: 138 MMOL/L (ref 135–145)
SPECIMEN EXPIRATION DATE: NORMAL
WBC # BLD AUTO: 7.5 10E3/UL (ref 4–11)

## 2024-01-31 PROCEDURE — 85610 PROTHROMBIN TIME: CPT | Performed by: PHYSICIAN ASSISTANT

## 2024-01-31 PROCEDURE — 258N000001 HC RX 258: Performed by: COLON & RECTAL SURGERY

## 2024-01-31 PROCEDURE — 258N000003 HC RX IP 258 OP 636: Performed by: ANESTHESIOLOGY

## 2024-01-31 PROCEDURE — 250N000011 HC RX IP 250 OP 636

## 2024-01-31 PROCEDURE — 88307 TISSUE EXAM BY PATHOLOGIST: CPT | Mod: 26 | Performed by: PATHOLOGY

## 2024-01-31 PROCEDURE — 0DJD8ZZ INSPECTION OF LOWER INTESTINAL TRACT, VIA NATURAL OR ARTIFICIAL OPENING ENDOSCOPIC: ICD-10-PCS | Performed by: COLON & RECTAL SURGERY

## 2024-01-31 PROCEDURE — 86900 BLOOD TYPING SEROLOGIC ABO: CPT | Performed by: PHYSICIAN ASSISTANT

## 2024-01-31 PROCEDURE — 272N000001 HC OR GENERAL SUPPLY STERILE: Performed by: COLON & RECTAL SURGERY

## 2024-01-31 PROCEDURE — 250N000013 HC RX MED GY IP 250 OP 250 PS 637: Performed by: INTERNAL MEDICINE

## 2024-01-31 PROCEDURE — 250N000025 HC SEVOFLURANE, PER MIN: Performed by: COLON & RECTAL SURGERY

## 2024-01-31 PROCEDURE — 370N000017 HC ANESTHESIA TECHNICAL FEE, PER MIN: Performed by: COLON & RECTAL SURGERY

## 2024-01-31 PROCEDURE — 258N000003 HC RX IP 258 OP 636

## 2024-01-31 PROCEDURE — 85041 AUTOMATED RBC COUNT: CPT | Performed by: PHYSICIAN ASSISTANT

## 2024-01-31 PROCEDURE — 360N000077 HC SURGERY LEVEL 4, PER MIN: Performed by: COLON & RECTAL SURGERY

## 2024-01-31 PROCEDURE — 250N000011 HC RX IP 250 OP 636: Performed by: COLON & RECTAL SURGERY

## 2024-01-31 PROCEDURE — 250N000011 HC RX IP 250 OP 636: Mod: JZ | Performed by: INTERNAL MEDICINE

## 2024-01-31 PROCEDURE — 250N000013 HC RX MED GY IP 250 OP 250 PS 637: Performed by: COLON & RECTAL SURGERY

## 2024-01-31 PROCEDURE — 80053 COMPREHEN METABOLIC PANEL: CPT | Performed by: PHYSICIAN ASSISTANT

## 2024-01-31 PROCEDURE — 258N000003 HC RX IP 258 OP 636: Performed by: COLON & RECTAL SURGERY

## 2024-01-31 PROCEDURE — 250N000009 HC RX 250: Performed by: COLON & RECTAL SURGERY

## 2024-01-31 PROCEDURE — 258N000003 HC RX IP 258 OP 636: Performed by: STUDENT IN AN ORGANIZED HEALTH CARE EDUCATION/TRAINING PROGRAM

## 2024-01-31 PROCEDURE — 99232 SBSQ HOSP IP/OBS MODERATE 35: CPT | Performed by: INTERNAL MEDICINE

## 2024-01-31 PROCEDURE — 88307 TISSUE EXAM BY PATHOLOGIST: CPT | Mod: TC | Performed by: COLON & RECTAL SURGERY

## 2024-01-31 PROCEDURE — 120N000001 HC R&B MED SURG/OB

## 2024-01-31 PROCEDURE — 250N000011 HC RX IP 250 OP 636: Performed by: INTERNAL MEDICINE

## 2024-01-31 PROCEDURE — 250N000009 HC RX 250

## 2024-01-31 PROCEDURE — 0DTN4ZZ RESECTION OF SIGMOID COLON, PERCUTANEOUS ENDOSCOPIC APPROACH: ICD-10-PCS | Performed by: COLON & RECTAL SURGERY

## 2024-01-31 PROCEDURE — 999N000141 HC STATISTIC PRE-PROCEDURE NURSING ASSESSMENT: Performed by: COLON & RECTAL SURGERY

## 2024-01-31 PROCEDURE — 710N000009 HC RECOVERY PHASE 1, LEVEL 1, PER MIN: Performed by: COLON & RECTAL SURGERY

## 2024-01-31 PROCEDURE — 0DBP4ZZ EXCISION OF RECTUM, PERCUTANEOUS ENDOSCOPIC APPROACH: ICD-10-PCS | Performed by: COLON & RECTAL SURGERY

## 2024-01-31 PROCEDURE — 36415 COLL VENOUS BLD VENIPUNCTURE: CPT | Performed by: PHYSICIAN ASSISTANT

## 2024-01-31 RX ORDER — ACETAMINOPHEN 325 MG/1
650 TABLET ORAL EVERY 4 HOURS PRN
Status: DISCONTINUED | OUTPATIENT
Start: 2024-02-03 | End: 2024-02-05 | Stop reason: HOSPADM

## 2024-01-31 RX ORDER — ACETAMINOPHEN 325 MG/1
975 TABLET ORAL ONCE
Status: DISCONTINUED | OUTPATIENT
Start: 2024-01-31 | End: 2024-01-31 | Stop reason: HOSPADM

## 2024-01-31 RX ORDER — HYDROMORPHONE HCL IN WATER/PF 6 MG/30 ML
0.2 PATIENT CONTROLLED ANALGESIA SYRINGE INTRAVENOUS
Status: DISCONTINUED | OUTPATIENT
Start: 2024-01-31 | End: 2024-02-05 | Stop reason: HOSPADM

## 2024-01-31 RX ORDER — HYDROMORPHONE HCL IN WATER/PF 6 MG/30 ML
0.4 PATIENT CONTROLLED ANALGESIA SYRINGE INTRAVENOUS EVERY 5 MIN PRN
Status: DISCONTINUED | OUTPATIENT
Start: 2024-01-31 | End: 2024-01-31 | Stop reason: HOSPADM

## 2024-01-31 RX ORDER — ONDANSETRON 2 MG/ML
INJECTION INTRAMUSCULAR; INTRAVENOUS PRN
Status: DISCONTINUED | OUTPATIENT
Start: 2024-01-31 | End: 2024-01-31

## 2024-01-31 RX ORDER — OXYCODONE HYDROCHLORIDE 5 MG/1
5 TABLET ORAL EVERY 4 HOURS PRN
Status: DISCONTINUED | OUTPATIENT
Start: 2024-01-31 | End: 2024-02-05 | Stop reason: HOSPADM

## 2024-01-31 RX ORDER — HYDROMORPHONE HCL IN WATER/PF 6 MG/30 ML
0.4 PATIENT CONTROLLED ANALGESIA SYRINGE INTRAVENOUS
Status: DISCONTINUED | OUTPATIENT
Start: 2024-01-31 | End: 2024-02-05 | Stop reason: HOSPADM

## 2024-01-31 RX ORDER — ONDANSETRON 2 MG/ML
4 INJECTION INTRAMUSCULAR; INTRAVENOUS EVERY 30 MIN PRN
Status: DISCONTINUED | OUTPATIENT
Start: 2024-01-31 | End: 2024-01-31 | Stop reason: HOSPADM

## 2024-01-31 RX ORDER — FENTANYL CITRATE 50 UG/ML
25 INJECTION, SOLUTION INTRAMUSCULAR; INTRAVENOUS EVERY 5 MIN PRN
Status: DISCONTINUED | OUTPATIENT
Start: 2024-01-31 | End: 2024-01-31 | Stop reason: HOSPADM

## 2024-01-31 RX ORDER — NALOXONE HYDROCHLORIDE 0.4 MG/ML
0.4 INJECTION, SOLUTION INTRAMUSCULAR; INTRAVENOUS; SUBCUTANEOUS
Status: DISCONTINUED | OUTPATIENT
Start: 2024-01-31 | End: 2024-02-05 | Stop reason: HOSPADM

## 2024-01-31 RX ORDER — FENTANYL CITRATE 50 UG/ML
50 INJECTION, SOLUTION INTRAMUSCULAR; INTRAVENOUS EVERY 5 MIN PRN
Status: DISCONTINUED | OUTPATIENT
Start: 2024-01-31 | End: 2024-01-31 | Stop reason: HOSPADM

## 2024-01-31 RX ORDER — NEOSTIGMINE METHYLSULFATE 1 MG/ML
VIAL (ML) INJECTION PRN
Status: DISCONTINUED | OUTPATIENT
Start: 2024-01-31 | End: 2024-01-31

## 2024-01-31 RX ORDER — CEFAZOLIN SODIUM/WATER 2 G/20 ML
2 SYRINGE (ML) INTRAVENOUS
Status: DISCONTINUED | OUTPATIENT
Start: 2024-01-31 | End: 2024-01-31 | Stop reason: HOSPADM

## 2024-01-31 RX ORDER — NALOXONE HYDROCHLORIDE 0.4 MG/ML
0.2 INJECTION, SOLUTION INTRAMUSCULAR; INTRAVENOUS; SUBCUTANEOUS
Status: DISCONTINUED | OUTPATIENT
Start: 2024-01-31 | End: 2024-02-05 | Stop reason: HOSPADM

## 2024-01-31 RX ORDER — DEXAMETHASONE SODIUM PHOSPHATE 4 MG/ML
4 INJECTION, SOLUTION INTRA-ARTICULAR; INTRALESIONAL; INTRAMUSCULAR; INTRAVENOUS; SOFT TISSUE
Status: DISCONTINUED | OUTPATIENT
Start: 2024-01-31 | End: 2024-01-31 | Stop reason: HOSPADM

## 2024-01-31 RX ORDER — ACETAMINOPHEN 325 MG/1
975 TABLET ORAL EVERY 8 HOURS
Qty: 27 TABLET | Refills: 0 | Status: COMPLETED | OUTPATIENT
Start: 2024-01-31 | End: 2024-02-03

## 2024-01-31 RX ORDER — PROPOFOL 10 MG/ML
INJECTION, EMULSION INTRAVENOUS PRN
Status: DISCONTINUED | OUTPATIENT
Start: 2024-01-31 | End: 2024-01-31

## 2024-01-31 RX ORDER — SODIUM CHLORIDE, SODIUM LACTATE, POTASSIUM CHLORIDE, CALCIUM CHLORIDE 600; 310; 30; 20 MG/100ML; MG/100ML; MG/100ML; MG/100ML
INJECTION, SOLUTION INTRAVENOUS CONTINUOUS
Status: DISCONTINUED | OUTPATIENT
Start: 2024-01-31 | End: 2024-01-31 | Stop reason: HOSPADM

## 2024-01-31 RX ORDER — KETOROLAC TROMETHAMINE 15 MG/ML
15 INJECTION, SOLUTION INTRAMUSCULAR; INTRAVENOUS
Status: DISCONTINUED | OUTPATIENT
Start: 2024-01-31 | End: 2024-01-31 | Stop reason: HOSPADM

## 2024-01-31 RX ORDER — KETOROLAC TROMETHAMINE 30 MG/ML
INJECTION, SOLUTION INTRAMUSCULAR; INTRAVENOUS PRN
Status: DISCONTINUED | OUTPATIENT
Start: 2024-01-31 | End: 2024-01-31

## 2024-01-31 RX ORDER — KETAMINE HYDROCHLORIDE 10 MG/ML
INJECTION INTRAMUSCULAR; INTRAVENOUS PRN
Status: DISCONTINUED | OUTPATIENT
Start: 2024-01-31 | End: 2024-01-31

## 2024-01-31 RX ORDER — ONDANSETRON 2 MG/ML
4 INJECTION INTRAMUSCULAR; INTRAVENOUS EVERY 6 HOURS PRN
Status: DISCONTINUED | OUTPATIENT
Start: 2024-01-31 | End: 2024-02-05 | Stop reason: HOSPADM

## 2024-01-31 RX ORDER — METRONIDAZOLE 500 MG/100ML
500 INJECTION, SOLUTION INTRAVENOUS EVERY 8 HOURS
Status: DISCONTINUED | OUTPATIENT
Start: 2024-01-31 | End: 2024-02-02

## 2024-01-31 RX ORDER — LIDOCAINE 40 MG/G
CREAM TOPICAL
Status: DISCONTINUED | OUTPATIENT
Start: 2024-01-31 | End: 2024-01-31 | Stop reason: HOSPADM

## 2024-01-31 RX ORDER — HYDROMORPHONE HCL IN WATER/PF 6 MG/30 ML
0.2 PATIENT CONTROLLED ANALGESIA SYRINGE INTRAVENOUS EVERY 5 MIN PRN
Status: DISCONTINUED | OUTPATIENT
Start: 2024-01-31 | End: 2024-01-31 | Stop reason: HOSPADM

## 2024-01-31 RX ORDER — METRONIDAZOLE 500 MG/100ML
500 INJECTION, SOLUTION INTRAVENOUS
Status: COMPLETED | OUTPATIENT
Start: 2024-01-31 | End: 2024-01-31

## 2024-01-31 RX ORDER — ENOXAPARIN SODIUM 100 MG/ML
40 INJECTION SUBCUTANEOUS EVERY 24 HOURS
Status: DISCONTINUED | OUTPATIENT
Start: 2024-02-01 | End: 2024-02-05 | Stop reason: HOSPADM

## 2024-01-31 RX ORDER — ONDANSETRON 4 MG/1
4 TABLET, ORALLY DISINTEGRATING ORAL EVERY 6 HOURS PRN
Status: DISCONTINUED | OUTPATIENT
Start: 2024-01-31 | End: 2024-02-05 | Stop reason: HOSPADM

## 2024-01-31 RX ORDER — PROPOFOL 10 MG/ML
INJECTION, EMULSION INTRAVENOUS CONTINUOUS PRN
Status: DISCONTINUED | OUTPATIENT
Start: 2024-01-31 | End: 2024-01-31

## 2024-01-31 RX ORDER — ONDANSETRON 4 MG/1
4 TABLET, ORALLY DISINTEGRATING ORAL EVERY 30 MIN PRN
Status: DISCONTINUED | OUTPATIENT
Start: 2024-01-31 | End: 2024-01-31 | Stop reason: HOSPADM

## 2024-01-31 RX ORDER — HYDRALAZINE HYDROCHLORIDE 20 MG/ML
2.5-5 INJECTION INTRAMUSCULAR; INTRAVENOUS EVERY 10 MIN PRN
Status: DISCONTINUED | OUTPATIENT
Start: 2024-01-31 | End: 2024-01-31 | Stop reason: HOSPADM

## 2024-01-31 RX ORDER — METOPROLOL TARTRATE 1 MG/ML
1-2 INJECTION, SOLUTION INTRAVENOUS EVERY 5 MIN PRN
Status: DISCONTINUED | OUTPATIENT
Start: 2024-01-31 | End: 2024-01-31 | Stop reason: HOSPADM

## 2024-01-31 RX ORDER — CIPROFLOXACIN 2 MG/ML
400 INJECTION, SOLUTION INTRAVENOUS EVERY 12 HOURS
Status: DISCONTINUED | OUTPATIENT
Start: 2024-01-31 | End: 2024-02-02

## 2024-01-31 RX ORDER — LIDOCAINE HYDROCHLORIDE 20 MG/ML
INJECTION, SOLUTION INFILTRATION; PERINEURAL PRN
Status: DISCONTINUED | OUTPATIENT
Start: 2024-01-31 | End: 2024-01-31

## 2024-01-31 RX ORDER — HEPARIN SODIUM 5000 [USP'U]/.5ML
5000 INJECTION, SOLUTION INTRAVENOUS; SUBCUTANEOUS
Status: COMPLETED | OUTPATIENT
Start: 2024-01-31 | End: 2024-01-31

## 2024-01-31 RX ORDER — OXYCODONE HYDROCHLORIDE 5 MG/1
10 TABLET ORAL EVERY 4 HOURS PRN
Status: DISCONTINUED | OUTPATIENT
Start: 2024-01-31 | End: 2024-02-05 | Stop reason: HOSPADM

## 2024-01-31 RX ORDER — FENTANYL CITRATE 50 UG/ML
INJECTION, SOLUTION INTRAMUSCULAR; INTRAVENOUS PRN
Status: DISCONTINUED | OUTPATIENT
Start: 2024-01-31 | End: 2024-01-31

## 2024-01-31 RX ORDER — LIDOCAINE 40 MG/G
CREAM TOPICAL
Status: DISCONTINUED | OUTPATIENT
Start: 2024-01-31 | End: 2024-02-05 | Stop reason: HOSPADM

## 2024-01-31 RX ORDER — DEXAMETHASONE SODIUM PHOSPHATE 4 MG/ML
INJECTION, SOLUTION INTRA-ARTICULAR; INTRALESIONAL; INTRAMUSCULAR; INTRAVENOUS; SOFT TISSUE PRN
Status: DISCONTINUED | OUTPATIENT
Start: 2024-01-31 | End: 2024-01-31

## 2024-01-31 RX ORDER — SODIUM CHLORIDE, SODIUM LACTATE, POTASSIUM CHLORIDE, CALCIUM CHLORIDE 600; 310; 30; 20 MG/100ML; MG/100ML; MG/100ML; MG/100ML
INJECTION, SOLUTION INTRAVENOUS CONTINUOUS
Status: DISCONTINUED | OUTPATIENT
Start: 2024-01-31 | End: 2024-02-02

## 2024-01-31 RX ORDER — CEFAZOLIN SODIUM/WATER 2 G/20 ML
2 SYRINGE (ML) INTRAVENOUS SEE ADMIN INSTRUCTIONS
Status: DISCONTINUED | OUTPATIENT
Start: 2024-01-31 | End: 2024-01-31 | Stop reason: HOSPADM

## 2024-01-31 RX ORDER — GLYCOPYRROLATE 0.2 MG/ML
INJECTION, SOLUTION INTRAMUSCULAR; INTRAVENOUS PRN
Status: DISCONTINUED | OUTPATIENT
Start: 2024-01-31 | End: 2024-01-31

## 2024-01-31 RX ADMIN — METRONIDAZOLE 500 MG: 500 INJECTION, SOLUTION INTRAVENOUS at 21:42

## 2024-01-31 RX ADMIN — OXYCODONE HYDROCHLORIDE 5 MG: 5 TABLET ORAL at 00:13

## 2024-01-31 RX ADMIN — PROPOFOL 50 MCG/KG/MIN: 10 INJECTION, EMULSION INTRAVENOUS at 13:41

## 2024-01-31 RX ADMIN — CIPROFLOXACIN 400 MG: 400 INJECTION, SOLUTION INTRAVENOUS at 20:17

## 2024-01-31 RX ADMIN — NEOSTIGMINE METHYLSULFATE 4.5 MG: 1 INJECTION, SOLUTION INTRAVENOUS at 17:23

## 2024-01-31 RX ADMIN — SODIUM CHLORIDE, POTASSIUM CHLORIDE, SODIUM LACTATE AND CALCIUM CHLORIDE: 600; 310; 30; 20 INJECTION, SOLUTION INTRAVENOUS at 14:52

## 2024-01-31 RX ADMIN — METRONIDAZOLE 500 MG: 500 INJECTION, SOLUTION INTRAVENOUS at 05:50

## 2024-01-31 RX ADMIN — LIDOCAINE HYDROCHLORIDE 40 MG: 20 INJECTION, SOLUTION INFILTRATION; PERINEURAL at 13:22

## 2024-01-31 RX ADMIN — ACETAMINOPHEN 650 MG: 325 TABLET, FILM COATED ORAL at 00:13

## 2024-01-31 RX ADMIN — GLYCOPYRROLATE 0.2 MG: 0.2 INJECTION, SOLUTION INTRAMUSCULAR; INTRAVENOUS at 13:22

## 2024-01-31 RX ADMIN — ROCURONIUM BROMIDE 10 MG: 50 INJECTION, SOLUTION INTRAVENOUS at 15:44

## 2024-01-31 RX ADMIN — SODIUM CHLORIDE, POTASSIUM CHLORIDE, SODIUM LACTATE AND CALCIUM CHLORIDE: 600; 310; 30; 20 INJECTION, SOLUTION INTRAVENOUS at 00:09

## 2024-01-31 RX ADMIN — DEXAMETHASONE SODIUM PHOSPHATE 8 MG: 4 INJECTION, SOLUTION INTRA-ARTICULAR; INTRALESIONAL; INTRAMUSCULAR; INTRAVENOUS; SOFT TISSUE at 13:22

## 2024-01-31 RX ADMIN — ONDANSETRON 4 MG: 2 INJECTION INTRAMUSCULAR; INTRAVENOUS at 14:01

## 2024-01-31 RX ADMIN — METRONIDAZOLE 500 MG: 500 INJECTION, SOLUTION INTRAVENOUS at 12:36

## 2024-01-31 RX ADMIN — Medication 50 MG: at 15:29

## 2024-01-31 RX ADMIN — PHENYLEPHRINE HYDROCHLORIDE 100 MCG: 10 INJECTION INTRAVENOUS at 13:44

## 2024-01-31 RX ADMIN — HYDROMORPHONE HYDROCHLORIDE 0.5 MG: 1 INJECTION, SOLUTION INTRAMUSCULAR; INTRAVENOUS; SUBCUTANEOUS at 15:25

## 2024-01-31 RX ADMIN — ACETAMINOPHEN 975 MG: 325 TABLET, FILM COATED ORAL at 21:39

## 2024-01-31 RX ADMIN — OXYCODONE HYDROCHLORIDE 5 MG: 5 TABLET ORAL at 21:40

## 2024-01-31 RX ADMIN — ROCURONIUM BROMIDE 30 MG: 50 INJECTION, SOLUTION INTRAVENOUS at 14:03

## 2024-01-31 RX ADMIN — ROCURONIUM BROMIDE 10 MG: 50 INJECTION, SOLUTION INTRAVENOUS at 14:47

## 2024-01-31 RX ADMIN — CIPROFLOXACIN 400 MG: 400 INJECTION, SOLUTION INTRAVENOUS at 08:08

## 2024-01-31 RX ADMIN — HYDROMORPHONE HYDROCHLORIDE 0.5 MG: 1 INJECTION, SOLUTION INTRAMUSCULAR; INTRAVENOUS; SUBCUTANEOUS at 14:35

## 2024-01-31 RX ADMIN — PROPOFOL 200 MG: 10 INJECTION, EMULSION INTRAVENOUS at 13:22

## 2024-01-31 RX ADMIN — GLYCOPYRROLATE 0.6 MG: 0.2 INJECTION, SOLUTION INTRAMUSCULAR; INTRAVENOUS at 17:23

## 2024-01-31 RX ADMIN — SODIUM CHLORIDE, POTASSIUM CHLORIDE, SODIUM LACTATE AND CALCIUM CHLORIDE: 600; 310; 30; 20 INJECTION, SOLUTION INTRAVENOUS at 19:59

## 2024-01-31 RX ADMIN — ROCURONIUM BROMIDE 10 MG: 50 INJECTION, SOLUTION INTRAVENOUS at 16:34

## 2024-01-31 RX ADMIN — HEPARIN SODIUM 5000 UNITS: 5000 INJECTION, SOLUTION INTRAVENOUS; SUBCUTANEOUS at 12:35

## 2024-01-31 RX ADMIN — OXYCODONE HYDROCHLORIDE 5 MG: 5 TABLET ORAL at 10:39

## 2024-01-31 RX ADMIN — HYDROMORPHONE HYDROCHLORIDE 0.4 MG: 0.2 INJECTION, SOLUTION INTRAMUSCULAR; INTRAVENOUS; SUBCUTANEOUS at 04:52

## 2024-01-31 RX ADMIN — SODIUM CHLORIDE, POTASSIUM CHLORIDE, SODIUM LACTATE AND CALCIUM CHLORIDE: 600; 310; 30; 20 INJECTION, SOLUTION INTRAVENOUS at 12:44

## 2024-01-31 RX ADMIN — HYDROMORPHONE HYDROCHLORIDE 0.5 MG: 1 INJECTION, SOLUTION INTRAMUSCULAR; INTRAVENOUS; SUBCUTANEOUS at 14:25

## 2024-01-31 RX ADMIN — HYDROMORPHONE HYDROCHLORIDE 0.5 MG: 1 INJECTION, SOLUTION INTRAMUSCULAR; INTRAVENOUS; SUBCUTANEOUS at 15:37

## 2024-01-31 RX ADMIN — FENTANYL CITRATE 100 MCG: 50 INJECTION INTRAMUSCULAR; INTRAVENOUS at 14:04

## 2024-01-31 RX ADMIN — KETOROLAC TROMETHAMINE 30 MG: 30 INJECTION, SOLUTION INTRAMUSCULAR at 16:54

## 2024-01-31 RX ADMIN — ROCURONIUM BROMIDE 50 MG: 50 INJECTION, SOLUTION INTRAVENOUS at 13:22

## 2024-01-31 RX ADMIN — FENTANYL CITRATE 100 MCG: 50 INJECTION INTRAMUSCULAR; INTRAVENOUS at 13:22

## 2024-01-31 RX ADMIN — SODIUM CHLORIDE, POTASSIUM CHLORIDE, SODIUM LACTATE AND CALCIUM CHLORIDE: 600; 310; 30; 20 INJECTION, SOLUTION INTRAVENOUS at 17:10

## 2024-01-31 RX ADMIN — MIDAZOLAM 2 MG: 1 INJECTION INTRAMUSCULAR; INTRAVENOUS at 13:12

## 2024-01-31 ASSESSMENT — ACTIVITIES OF DAILY LIVING (ADL)
ADLS_ACUITY_SCORE: 18
ADLS_ACUITY_SCORE: 22
ADLS_ACUITY_SCORE: 18
ADLS_ACUITY_SCORE: 18
ADLS_ACUITY_SCORE: 22
ADLS_ACUITY_SCORE: 18

## 2024-01-31 NOTE — PROGRESS NOTES
[facility]    Medicine Progress Note - Hospitalist Service    Date of Admission:  1/26/2024    Assessment & Plan     Marko Pastor is a 38 year old male who is Romansh speaking without significant past medical history other than diverticulosis and diverticulitis. He was just hospitalized and discharged 1/14/2024 for a stay for with diverticulitis and intra-abdominal abscess that was not amenable to drainage.  He was treated with Zosyn and transition to 10 days of Augmentin which he finished at home on 1/21/2025.  He did well for 3 or so days but developed worsening abdominal pain again.  He presented to the ED on 1/26/2024 for reevaluation.  ED evaluation showed stable vital signs.  His white blood count was up at over 12, hemoglobin is 14.3, and platelets 259.  His lactic acid was normal. CT scan of the abdomen was unchanged from his previous showing a contained perforation from his diverticulitis.  He was started on Cipro and Flagyl and admitted for further treatment of diverticulitis with contained perforation.  Colorectal surgery was consulted.  He was treated conservatively.  On 1/29/2024 colorectal surgery consulted interventional radiology who placed a drain in the abdominal abscess. On 1/30/24 colorectal surgery recommended colon prep for surgical treatment on 1/31/24.      Plans today:  -surgery today     Problem list:    Diverticulitis with contained perforation  Abdomen pain, nausea, and vomiting  Status post abscess drain placement by interventional radiology 1/29/2024  -Colorectal surgery and IR consults appreciated  -IR placed a drain on 1/29 in abscess and cultures are growing enterococcus and gram negative bacilli  -Monitor drain output  -Continue ciprofloxacin and Flagyl  -To the OR today     Constipation  -Had colon prep last night in anticipation of surgery today          Diet: NPO per Anesthesia Guidelines for Procedure/Surgery Except for: Meds    DVT Prophylaxis:  "Heparin SQ  Collins Catheter: PRESENT, indication:    Lines: None     Cardiac Monitoring: None  Code Status: Full Code      Clinically Significant Risk Factors               # Coagulation Defect: INR = 1.20 (Ref range: 0.85 - 1.15) and/or PTT = N/A, will monitor for bleeding           # Overweight: Estimated body mass index is 29.12 kg/m  as calculated from the following:    Height as of 1/12/24: 1.72 m (5' 7.72\").    Weight as of this encounter: 86.1 kg (189 lb 14.4 oz).             Disposition Plan      Expected Discharge Date: 02/01/2024,  3:00 PM                  Eagle Perez MD  Hospitalist Service  [facility]  Securely message with [employer] (more info)  Text page via [employer] Paging/Directory   ______________________________________________________________________    Interval History   No new problems. Tolerated colon prep. No chest pain or shortness of breath.     Physical Exam   Vital Signs: Temp: 99.1  F (37.3  C) Temp src: Temporal BP: 126/66 Pulse: 60   Resp: 12 SpO2: 100 % O2 Device: None (Room air)    Weight: 189 lbs 14.4 oz    GENERAL:  Comfortable. Cooperative.  PSYCH: pleasant, oriented, No acute distress.  EYES: PERRLA, Normal conjunctiva.  HEART:  Regular rate and rhythm. No JVD. Pulses normal. No edema.  LUNGS:  Clear to auscultation, normal Respiratory effort.  ABDOMEN:  Soft, no hepatosplenomegaly, normal bowel sounds. Midline anterior drain in place.   EXTREMETIES: No clubbing, cyanosis or ischemia  SKIN:  Dry to touch, No rash.      Medical Decision Making       40 MINUTES SPENT BY ME on the date of service doing chart review, history, exam, documentation & further activities per the note.      Data     I have personally reviewed the following data over the past 24 hrs:    7.5  \   13.3   / 259     138 102 3.1 (L) /  105 (H)   3.8 28 0.73 \     ALT: 15 AST: 20 AP: 60 TBILI: 0.3   ALB: 3.9 TOT PROTEIN: 7.2 LIPASE: N/A     INR:  1.20 (H) PTT:  N/A   D-dimer:  N/A " Fibrinogen:  N/A       Imaging results reviewed over the past 24 hrs:   No results found for this or any previous visit (from the past 24 hour(s)).  Recent Labs   Lab 01/31/24  0743 01/29/24  0715 01/28/24  0725 01/27/24  0723 01/26/24  1601   WBC 7.5 8.5 11.8* 13.7* 12.8*   HGB 13.3 13.5 13.0* 13.5 14.3   MCV 93 92 92 92 93    242 220 223 259   INR 1.20*  --   --   --   --      --   --  136 138   POTASSIUM 3.8  --   --  3.7 4.1   CHLORIDE 102  --   --  100 100   CO2 28  --   --  26 27   BUN 3.1*  --   --  5.7* 7.1   CR 0.73  --   --  0.72 0.76   ANIONGAP 8  --   --  10 11   SANTOS 8.9  --   --  8.7 8.8   *  --   --  93 95   ALBUMIN 3.9  --   --   --  4.2   PROTTOTAL 7.2  --   --   --  8.0   BILITOTAL 0.3  --   --   --  0.5   ALKPHOS 60  --   --   --  65   ALT 15  --   --   --  22   AST 20  --   --   --  18

## 2024-01-31 NOTE — BRIEF OP NOTE
[facility]    Brief Operative Note    Pre-operative diagnosis: Perforation of sigmoid colon due to diverticulitis [K57.20]  Post-operative diagnosis Same as pre-operative diagnosis    Procedure: Laparoscopic sigmoid colectomy, N/A - Abdomen  Colonoscopy, N/A - Rectum    Surgeon: Surgeon(s) and Role:     * Sandy Dale MD - Primary     * Siria Fierro PA-C - Assisting     * Zelalem Wilde PA-C  Anesthesia: General   Estimated Blood Loss: 20 mL    Drains: Fred-Mathis  Specimens:   ID Type Source Tests Collected by Time Destination   1 : sigmoid colon staple line distal anastomosis ring and rectal trim Tissue Large Intestine, Colon, Sigmoid SURGICAL PATHOLOGY EXAM Sandy Dale MD 1/31/2024  4:11 PM      Findings:   None.  Complications: None.  Implants: * No implants in log *      Condition on discharge from OR: Satisfactory    Zelalem Wilde PA-C   [facility]   [phone].        ADDENDUM:    PATIENT DATA  Indicate Y or N:  Home O2 No  Hemodialysis  No  Transplant patient  No  Cirrhosis  No  Steroids in last 30 days  No  Immunomodulators in last 30 days  No  Anticoagulation at time of surgery  No   List medication N/A  Prior abdominal surgery  No  Pelvic irradiation  No    Albumin within 30 days if known 3.9   Hgb within 30 days if known   Hemoglobin   Date Value Ref Range Status   01/31/2024 13.3 13.3 - 17.7 g/dL Final   ]  Cr within 30 days if known   Creatinine   Date Value Ref Range Status   01/31/2024 0.73 0.67 - 1.17 mg/dL Final   ]  Body mass index is 29.12 kg/m .      OR DATA  Emergent  No   <24 hours  No   <1 week  No  Bowel Prep Yes  Antibiotics  Yes  DVT prophylaxis    Heparin  Yes   SCD  Yes   None  No  Drain  Yes  ASA (1,2,3,4) 3  OR time (min) 199  Stents  No  Transfuse >/= 2U  No  Anastomosis   Stapled  Yes   Handsewn  No  Leak Test    Positive  No   Negative  Yes   Not done  No

## 2024-01-31 NOTE — ANESTHESIA PREPROCEDURE EVALUATION
Anesthesia Pre-Procedure Evaluation    Patient: Marko Pastor   MRN: [id number] : 1985        Procedure : Procedure(s):  Laparoscopic possible open sigmiod resection possible stoma  Colonoscopy          History reviewed. No pertinent past medical history.   History reviewed. No pertinent surgical history.   No Known Allergies   Social History     Tobacco Use    Smoking status: Never    Smokeless tobacco: Never   Substance Use Topics    Alcohol use: Not on file      Wt Readings from Last 1 Encounters:   24 86.1 kg (189 lb 14.4 oz)        Anesthesia Evaluation   Pt has not had prior anesthetic         ROS/MED HX  ENT/Pulmonary:  - neg pulmonary ROS     Neurologic:  - neg neurologic ROS     Cardiovascular:  - neg cardiovascular ROS     METS/Exercise Tolerance: >4 METS    Hematologic: Comments: Lab Test        24                       0743          0715          0725          WBC          7.5          8.5          11.8*         HGB          13.3         13.5         13.0*         MCV          93           92           92            PLT          259          242          220           INR          1.20*         --           --            Lab Test        24                       0743          0723          1601          NA           138          136          138           POTASSIUM    3.8          3.7          4.1           CHLORIDE     102          100          100           CO2                      BUN          3.1*         5.7*         7.1           CR           0.73         0.72         0.76          ANIONGAP     8            10           11            SANTOS          8.9          8.7          8.8           GLC          105*         93           95                  Musculoskeletal:  - neg musculoskeletal ROS     GI/Hepatic: Comment:  Perforation of sigmoid colon due to diverticulitis      "  Renal/Genitourinary:  - neg Renal ROS     Endo:  - neg endo ROS     Psychiatric/Substance Use:  - neg psychiatric ROS     Infectious Disease:  - neg infectious disease ROS     Malignancy:  - neg malignancy ROS     Other:  - neg other ROS          Physical Exam    Airway        Mallampati: II   TM distance: > 3 FB   Neck ROM: full   Mouth opening: > 3 cm    Respiratory Devices and Support         Dental       (+) Minor Abnormalities - some fillings, tiny chips      Cardiovascular   cardiovascular exam normal          Pulmonary   pulmonary exam normal                OUTSIDE LABS:  CBC:   Lab Results   Component Value Date    WBC 7.5 01/31/2024    WBC 8.5 01/29/2024    HGB 13.3 01/31/2024    HGB 13.5 01/29/2024    HCT 40.6 01/31/2024    HCT 39.8 (L) 01/29/2024     01/31/2024     01/29/2024     BMP:   Lab Results   Component Value Date     01/31/2024     01/27/2024    POTASSIUM 3.8 01/31/2024    POTASSIUM 3.7 01/27/2024    CHLORIDE 102 01/31/2024    CHLORIDE 100 01/27/2024    CO2 28 01/31/2024    CO2 26 01/27/2024    BUN 3.1 (L) 01/31/2024    BUN 5.7 (L) 01/27/2024    CR 0.73 01/31/2024    CR 0.72 01/27/2024     (H) 01/31/2024    GLC 93 01/27/2024     COAGS:   Lab Results   Component Value Date    INR 1.20 (H) 01/31/2024     POC: No results found for: \"BGM\", \"HCG\", \"HCGS\"  HEPATIC:   Lab Results   Component Value Date    ALBUMIN 3.9 01/31/2024    PROTTOTAL 7.2 01/31/2024    ALT 15 01/31/2024    AST 20 01/31/2024    ALKPHOS 60 01/31/2024    BILITOTAL 0.3 01/31/2024     OTHER:   Lab Results   Component Value Date    LACT 0.7 01/26/2024    SANTOS 8.9 01/31/2024    LIPASE 15 01/07/2024       Anesthesia Plan    ASA Status:  3       Anesthesia Type: General.     - Airway: ETT   Induction: Intravenous, Propofol.   Maintenance: Balanced.        Consents    Anesthesia Plan(s) and associated risks, benefits, and realistic alternatives discussed. Questions answered and patient/representative(s) " expressed understanding.     - Discussed:     - Discussed with:  Patient      - Extended Intubation/Ventilatory Support Discussed: No.      - Patient is DNR/DNI Status: No     Use of blood products discussed: No .     Postoperative Care    Pain management: IV analgesics.   PONV prophylaxis: Dexamethasone or Solumedrol, Ondansetron (or other 5HT-3)     Comments:               Mustapha Garcia MD    I have reviewed the pertinent notes and labs in the chart from the past 30 days and (re)examined the patient.  Any updates or changes from those notes are reflected in this note.

## 2024-01-31 NOTE — ANESTHESIA CARE TRANSFER NOTE
Patient: Marko Pastor    Procedure: Procedure(s):  Laparoscopic sigmoid colectomy  Colonoscopy       Diagnosis: Perforation of sigmoid colon due to diverticulitis [K57.20]  Diagnosis Additional Information: No value filed.    Anesthesia Type:   General     Note:    Oropharynx: oral airway in place  Level of Consciousness: drowsy  Oxygen Supplementation: face mask  Level of Supplemental Oxygen (L/min / FiO2): 6    Dentition: dentition unchanged  Vital Signs Stable: post-procedure vital signs reviewed and stable  Report to RN Given: handoff report given  Patient transferred to: PACU    Handoff Report: Identifed the Patient, Identified the Reponsible Provider, Reviewed the pertinent medical history, Discussed the surgical course, Reviewed Intra-OP anesthesia mangement and issues during anesthesia, Set expectations for post-procedure period and Allowed opportunity for questions and acknowledgement of understanding      Vitals:  Vitals Value Taken Time   /56    Temp 37    Pulse 57 01/31/24 1749   Resp 15 01/31/24 1749   SpO2 100 % 01/31/24 1749   Vitals shown include unfiled device data.    Electronically Signed By: LENNY Do CRNA  January 31, 2024  5:50 PM

## 2024-01-31 NOTE — OP NOTE
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  He was treated with antibiotics and discharged home and completed a 14-day course.  Several days later he had recurrence of severe pain prompting him to come the emergency room.  On the CT scan he had evidence of air and fluid within the mesentery posterior to the sigmoid colon consistent with sigmoid diverticulitis.  A drain was placed and feculent output was noted.  This prompted us to recommend sigmoid resection.  Reviewed the risk of bleeding, infection, alteration of bowel function, possible anastomotic leak, possible need for stoma the original operation, and other risks associated major abdominal surgery and general anesthesia including but not limited to DVT, PE, heart attack, stroke, demonstrating structures, other infectious complications, and even death.  He understood and wished to proceed.     FINDINGS: On colonoscopy, he had inflammatory changes in the sigmoid colon.  There is no other evidence of inflammatory bowel disease.  No neoplasia was noted.  The prep was fair.  There was a very thickened and edematous sigmoid colon adherent to the left anterior pelvic sidewall.  We did encounter an abscess cavity here.  There was a drain that was in the sigmoid colon mesentery.  There was an adherent loop of small bowel which appeared to be by standard.  We created a coloproctostomy at 15 cm from the anal verge using a 29 circular  stapler.  The leak test was negative.     DESCRIPTION OF PROCEDURE: After informed consent was obtained patient was taken the operating room positioned on the operating table in the supine position.  He was intubated a Collins catheter placed.  He was positioned modified lithotomy position.  A timeout was undertaken and the colonoscope was advanced into the anal opening traversed the cecum without difficulty.  The preparation was fair with coating of bilious stool.  This was irrigated to some extent but essentially we are looking for large lesions or inflammatory bowel disease.  The mucosa appeared normal except in the sigmoid colon where there was extensive diverticulosis and hyperemia.  There was no obvious lesions.  The rectum was normal on direct and retroflexed view.    He was then positioned in modified lithotomy position with his right arm tucked and padded.  He was secured to the bed with wide tape across the chest.  He had a percutaneous drain through his mid abdomen.  This was disconnected and.  The abdomen was then prepped and draped in usual fashion.  We began after the timeout by making a 1 cm incision above the umbilicus.  Dissection was carried down through the fascia and the peritoneal cavity was entered without difficulty.  No Vicryl suture was placed at the level of fascia and Vila port was inserted.  The abdomen was insufflated to pressure 15 and inspection revealed a thickened sigmoid colon adherent anteriorly.  There is a loop of small bowel that was also adherent in that area as was the appendix.  There is no other lesions noted within the abdomen.  A tap block was then performed injecting 10 cc of the above mixture into each of the 4 quadrants.  Two 5 mm ports were placed in the right abdomen.  Patient was tilted such that the left side was elevated.  We began by mobilizing some omentum that was adherent down into the pelvis and incising the line of Toldt laterally.  We carried our dissection  down towards the pelvis and used the suction  to free up the small bowel from the medial aspect and.  The appendix was also involved in this area and it was easily detached from the abscess cavity.  This allowed us good view of the sigmoid colon which was densely adherent anteriorly.  We began with the lateral aspect fracturing this off of the anterior abdominal wall using a combination of the LigaSure and the suction .  At this point we did encounter the drain which was going in through the mesentery into an abscess cavity.  This was removed externally intact.  We then mobilized the sigmoid colon off of the left pelvic sidewall.  We began more proximally where we could identify the ureter easily and I carried this dissection inferiorly to healthy bowel below the inflamed segment.  Having done so we could straighten out the segment and at the upper rectum was soft and compliant therefore I felt that a primary anastomosis should be considered.  Sizer was placed into the rectum and we could come up to the intended site of division.  I the peritoneum was incised on each side and the mesentery was divided close to the bowel.  Given the inflammatory changes within the mesentery I did not formally ligate the superior hemorrhoidal vessels.  Having divided the mesentery with a sufficient aperture we switched to a 530 scope in the right lower quadrant and brought the reticulating CALDERON stapler in through the umbilical port and fired across the upper rectum.  Having done so we turned our attention back and divided the remaining mesentery towards the descending colon where there was soft proximal bowel.  Having done so we divided the mesentery perpendicularly out and freed this up circumferentially.  We could see the ureter was well free from this area fortunately.  The mesentery was inspected was nicely hemostatic.  We placed a locking grasper on the distal stapler and then positioned ourselves in a more  neutral position.  We had excellent length of the descending colon down towards the pelvis therefore we did not have to fully mobilized the splenic flexure.    We then opened a roughly 4 cm Pfannenstiel incision and side and using the skin and fascia transversely.  Flaps were elevated superiorly inferiorly and the muscle was spread in the middle the peritoneal cavity was entered without difficulty.  Medium Alberto was placed.  The specimen was carefully delivered out of this wound and was quite bulky.  Having done so we could see soft compliant bowel more proximally in the remaining mesenteric attachments were divided using the LigaSure and the electrocautery.  K[place]ilof Kocher clamp were placed.  A 2 0 Prolene pursestring was used in duplicate.  The 29 anvil was inserted into the soft compliant bowel.  We ensured hemostasis which was excellent.  This was dropped back into the abdomen and a clean gloves were used.  We occluded the Pfannenstiel incision by twisting the Alberto and clamping this.  We then reinsufflated.    Siria then went below and passed the sizers.  In passing the sizers up to the top there was a slight break in the serosa and the anterior surface of the rectum.  For this reason I elected to excise a small amount of additional rectum.  The mesentery was divided posteriorly and again another fire of CALDERON 75 green load was used to divide the upper rectum.  This small piece of rectum was placed in the right lower quadrant and then the sizers and stapler were passed up again this time it came up to the top of the staple line quite nicely.  The spike was deployed slightly anterior to the staple line and this was merged with the anvil.  We ensured proper orientation and excellent hemostasis with good blood supply of each side.  This was merged closed and fired without difficulty.  2 intact anastomotic rings were retrieved and the staple fired without difficulty.  We then again positioned in a more neutral  position filled the pelvis with saline and occluded the bowel.  A proctoscopy was performed and there was no air leak.    We then switched to a clean closure tray and remove the ports.  We placed a 19 round drain in the pelvis through the right lower quadrant port.  The peritoneum was reapproximated with 2-0 Vicryl.  The layers were irrigated and the fascia was reapproximated with oh looped PDS.  Skin edges reapproximated with 4-0 Monocryl and.  A drain sponge was placed.  He was returned to supine position extubated in the operating room and taken recovery in stable condition.    Sponge needle and instrument counts were correct at the end the case.    Estimated blood loss 20 cc.    Specimen:Sigmoid colon, staple line distal, and anastomotic rings, and rectal trim.    COMPLICATIONS: No immediate complications    Sandy Dale MD

## 2024-01-31 NOTE — PROGRESS NOTES
COLON & RECTAL SURGERY  PROGRESS NOTE    January 31, 2024    SUBJECTIVE:  Feeling okay, pain controlled with oxycodone and dilaudid. Denies N/V. Completed bowel prep. EUN with 310 ml output. AM labs pending.     OBJECTIVE:  Temp:  [97.8  F (36.6  C)-99.5  F (37.5  C)] 99.5  F (37.5  C)  Pulse:  [62-65] 65  Resp:  [16] 16  BP: (114-126)/(60-75) 114/60  SpO2:  [98 %-100 %] 100 %    Intake/Output Summary (Last 24 hours) at 1/31/2024 0731  Last data filed at 1/31/2024 0618  Gross per 24 hour   Intake --   Output 310 ml   Net -310 ml       GENERAL:  Awake, alert, no acute distress, sitting in chair  HEAD: Nomocephalic atraumatic  SCLERA: anicteric  EXTREMITIES: warm and well perfused  ABDOMEN:  Soft, tender to palpation in lower abdomen, non-distended, no rebound or guarding, no peritoneal signs, drain intact.      LABS:  Lab Results   Component Value Date    WBC 8.5 01/29/2024     Lab Results   Component Value Date    HGB 13.5 01/29/2024     Lab Results   Component Value Date    HCT 39.8 01/29/2024     Lab Results   Component Value Date     01/29/2024     Last Basic Metabolic Panel:  Lab Results   Component Value Date     01/27/2024      Lab Results   Component Value Date    POTASSIUM 3.7 01/27/2024     Lab Results   Component Value Date    CHLORIDE 100 01/27/2024     Lab Results   Component Value Date    SANTOS 8.7 01/27/2024     Lab Results   Component Value Date    CO2 26 01/27/2024     Lab Results   Component Value Date    BUN 5.7 01/27/2024     Lab Results   Component Value Date    CR 0.72 01/27/2024     Lab Results   Component Value Date    GLC 93 01/27/2024       ASSESSMENT/PLAN: Marko Pastor is a 38-year-old [place] Islander speaking male re-admitted on 1/27/24 with perforated diverticulitis. IR drain was placed yesterday.    -NPO/IVFs  -Continue drain  -Continue antibiotics  -Encourage ambulation  -Pain management  -He is scheduled for a colonoscopy and a laparoscopic sigmoid colectomy with possible stoma  today.        For questions/paging, please contact the CRS office at [phone].    Zelalem Wilde PA-C  [facility]  Phone: [phone]

## 2024-01-31 NOTE — CONSULTS
"CLINICAL NUTRITION SERVICES  -  ASSESSMENT NOTE      Recommendations:   - Diet per CRS team.  Ok for small/frequent meals.    - Discussed prn Ensure, how to order, and sent 1x trial.       MALNUTRITION:  % Weight Loss:  1-2% in 1 week (moderate malnutrition) --> if admit wt accurate  % Intake:  </= 50% for >/= 5 days (severe malnutrition)  Subcutaneous Fat Loss:  None observed  Muscle Loss:  None observed  Fluid Retention: None documented or noted    Malnutrition Diagnosis: Moderate malnutrition  In Context of:  Acute illness or injury          REASON FOR ASSESSMENT  Marko Pastor is a 38 year old male seen by Registered Dietitian for length of stay.      PMH of: Recent admit for diverticulitis and intra-abdominal abscess which was not amendable to drainage, discharged home on ABX.    Admit 2/2: Contained perforation, concern for colocutaneous fistula.    NUTRITION HISTORY  - Information obtained from patient using interpretor services.    - Diet at home: Regular w/ meals TID.  - Barriers to PO intakes: Marko tells me he was recently admitted, discharged to home, and had little appetite, so was eating less than usual before he was readmitted.    - Reviewed chart and no previous RD notes.  Was admitted from 1/07-1/14 and discharged on a low fiber diet.   - Allergies: NKFA.      CURRENT NUTRITION ORDERS  Diet Order:     Fulls    Current Intake/Tolerance:  Abdominal drain placed on 1/29.  Surgical intervention yesterday (1/31) - lap sigmoid resection.  Patient has been NPO or liquids since admission.      Marko had just finished a meal tray.  He is not having nausea or pain w/ PO intakes.      ANTHROPOMETRICS  Height: 5' 7\"  Weight: 189 lbs 14.4 oz  Body mass index is 29.12 kg/m .  Weight Status:  Overweight BMI 25-29.9  Weight History:  Wt Readings from Last 10 Encounters:   01/26/24 86.1 kg (189 lb 14.4 oz)   01/14/24 57 kg (125 lb 9.6 oz)   05/25/23 81.6 kg (180 lb)     - Wt of 204# from 6/02/2023.  No " other wt trends available (care everywhere reviewed).   - Suspect that wt of 125# from 1/14/2024 admit was not accurate.  - There is no current documentation of edema.    - Patient himself reports he does not know usual body weight or recent wt trends.  - Reviewed standing scale wts from recent admit and 196# or 199# on last admit --> 4% wt loss in period of weeks, if admit wt accurate.    LABS: Reviewed.    MEDICATIONS: Reviewed.    GI: Stooling patterns noted with documented BM on 1/27.    SKIN: No current documentation of PI.       ASSESSED NUTRITION NEEDS PER APPROVED PRACTICE GUIDELINES:    Dosing Weight 86 kg   Estimated Energy Needs: >/=2082 kcals - [place] [address] Jeor w/ activity factor >/=1.2   Justification: maintenance  Estimated Protein Needs: + grams protein - 1-1.2+ g pro/Kg  Justification: post-op and preservation of lean body mass  Estimated Fluid Needs: per MD      NUTRITION DIAGNOSIS:  Inadequate oral intake related to diet restrictions pre- and post-op as evidenced by NPO/liquids acutely.     NUTRITION INTERVENTIONS  Recommendations / Nutrition Prescription  See above.    Implementation  Nutrition education: Provided education on availability of prn Ensure while diet advancing.  Sent 1x trial and discussed how to order additional prn.  Briefly discussed ok for small/frequent meals.      Medical Food Supplement: As above.     Collaboration and Referral of Nutrition care: Discussed POC with team during rounds and w/ RN.    Nutrition goals:  Diet advancement to solids w/in 24-48 hrs.    PO intakes of at least 50-75% meal, snack, or supplement consumption to show progression of PO intakes.       MONITORING AND EVALUATION:  Progress towards goals will be monitored and evaluated per protocol and Practice Guidelines          Maddy Sherman RDN, LD  Clinical Dietitian  3rd floor/ICU: [phone]  All other floors: [phone]  Weekend/holiday: [phone]  Office: [phone]

## 2024-01-31 NOTE — ANESTHESIA PROCEDURE NOTES
Airway       Patient location during procedure: OR       Procedure Start/Stop Times: 1/31/2024 1:24 PM  Staff -        Anesthesiologist:  Mustapha Garcia MD       CRNA: Osman Prather APRN CRNA       Performed By: CRNA  Consent for Airway        Urgency: elective  Indications and Patient Condition       Indications for airway management: abbi-procedural       Induction type:intravenous       Mask difficulty assessment: 1 - vent by mask    Final Airway Details       Final airway type: endotracheal airway       Successful airway: ETT - single  Endotracheal Airway Details        ETT size (mm): 8.0       Cuffed: yes       Successful intubation technique: direct laryngoscopy       DL Blade Type: MAC 3       Grade View of Cords: 2       Adjucts: stylet       Position: Right       Measured from: lips       Secured at (cm): 23       Bite block used: Oral Airway    Post intubation assessment        Placement verified by: capnometry, equal breath sounds and chest rise        Number of attempts at approach: 1       Number of other approaches attempted: 0       Secured with: tape       Ease of procedure: easy       Dentition: Intact and Unchanged    Medication(s) Administered   Medication Administration Time: 1/31/2024 1:24 PM

## 2024-01-31 NOTE — PLAN OF CARE
[id number]:    used to communicate with patient. A&Ox4. Up independent, ambulating in halls. C/o of pain oxycodone given, reports decrease in pain. EUN drain in place, irrigated per MAR, fecal, odorous output. Continuing bowel prep, last report stool watery, tan. Denies nausea. Tolerating clear liquids, will be NPO at midnight. In person  scheduled for tomorrow [id number] for surgery. Continuing IV Cipro and Flagyl. IV fluids running. Plan to have surgery tomorrow at 1300.

## 2024-01-31 NOTE — PLAN OF CARE
Goal Outcome Evaluation:    Pt is A & O x 4, ind, French speaking, Oxycodone, Tylenol, Dialudid given for pain with relief, abd drainage tube flushed draining, Cipro 7 Flagyl abx, LR @ 75 ml/hr, bowel prep complete,NPO, CRS following, plan is for laparoscopic sigmoid colectomy @ 12:40 pm.

## 2024-02-01 LAB
ANION GAP SERPL CALCULATED.3IONS-SCNC: 8 MMOL/L (ref 7–15)
BUN SERPL-MCNC: 5.4 MG/DL (ref 6–20)
CALCIUM SERPL-MCNC: 8.4 MG/DL (ref 8.6–10)
CHLORIDE SERPL-SCNC: 103 MMOL/L (ref 98–107)
CREAT SERPL-MCNC: 0.69 MG/DL (ref 0.67–1.17)
DEPRECATED HCO3 PLAS-SCNC: 27 MMOL/L (ref 22–29)
EGFRCR SERPLBLD CKD-EPI 2021: >90 ML/MIN/1.73M2
GLUCOSE SERPL-MCNC: 111 MG/DL (ref 70–99)
HGB BLD-MCNC: 12.3 G/DL (ref 13.3–17.7)
POTASSIUM SERPL-SCNC: 3.6 MMOL/L (ref 3.4–5.3)
SODIUM SERPL-SCNC: 138 MMOL/L (ref 135–145)

## 2024-02-01 PROCEDURE — 258N000003 HC RX IP 258 OP 636: Performed by: COLON & RECTAL SURGERY

## 2024-02-01 PROCEDURE — 36415 COLL VENOUS BLD VENIPUNCTURE: CPT | Performed by: COLON & RECTAL SURGERY

## 2024-02-01 PROCEDURE — 85018 HEMOGLOBIN: CPT | Performed by: COLON & RECTAL SURGERY

## 2024-02-01 PROCEDURE — 250N000011 HC RX IP 250 OP 636: Performed by: COLON & RECTAL SURGERY

## 2024-02-01 PROCEDURE — 120N000001 HC R&B MED SURG/OB

## 2024-02-01 PROCEDURE — 99232 SBSQ HOSP IP/OBS MODERATE 35: CPT | Performed by: INTERNAL MEDICINE

## 2024-02-01 PROCEDURE — 250N000013 HC RX MED GY IP 250 OP 250 PS 637: Performed by: COLON & RECTAL SURGERY

## 2024-02-01 PROCEDURE — 80048 BASIC METABOLIC PNL TOTAL CA: CPT | Performed by: COLON & RECTAL SURGERY

## 2024-02-01 RX ADMIN — HYDROMORPHONE HYDROCHLORIDE 0.2 MG: 0.2 INJECTION, SOLUTION INTRAMUSCULAR; INTRAVENOUS; SUBCUTANEOUS at 15:07

## 2024-02-01 RX ADMIN — HYDROMORPHONE HYDROCHLORIDE 0.2 MG: 0.2 INJECTION, SOLUTION INTRAMUSCULAR; INTRAVENOUS; SUBCUTANEOUS at 01:11

## 2024-02-01 RX ADMIN — HYDROMORPHONE HYDROCHLORIDE 0.2 MG: 0.2 INJECTION, SOLUTION INTRAMUSCULAR; INTRAVENOUS; SUBCUTANEOUS at 20:58

## 2024-02-01 RX ADMIN — OXYCODONE HYDROCHLORIDE 5 MG: 5 TABLET ORAL at 07:38

## 2024-02-01 RX ADMIN — ACETAMINOPHEN 975 MG: 325 TABLET, FILM COATED ORAL at 05:37

## 2024-02-01 RX ADMIN — OXYCODONE HYDROCHLORIDE 5 MG: 5 TABLET ORAL at 07:41

## 2024-02-01 RX ADMIN — METRONIDAZOLE 500 MG: 500 INJECTION, SOLUTION INTRAVENOUS at 04:27

## 2024-02-01 RX ADMIN — HYDROMORPHONE HYDROCHLORIDE 0.2 MG: 0.2 INJECTION, SOLUTION INTRAMUSCULAR; INTRAVENOUS; SUBCUTANEOUS at 05:35

## 2024-02-01 RX ADMIN — CIPROFLOXACIN 400 MG: 400 INJECTION, SOLUTION INTRAVENOUS at 07:30

## 2024-02-01 RX ADMIN — ONDANSETRON 4 MG: 4 TABLET, ORALLY DISINTEGRATING ORAL at 15:06

## 2024-02-01 RX ADMIN — ACETAMINOPHEN 975 MG: 325 TABLET, FILM COATED ORAL at 22:27

## 2024-02-01 RX ADMIN — HYDROMORPHONE HYDROCHLORIDE 0.2 MG: 0.2 INJECTION, SOLUTION INTRAMUSCULAR; INTRAVENOUS; SUBCUTANEOUS at 11:00

## 2024-02-01 RX ADMIN — OXYCODONE HYDROCHLORIDE 10 MG: 5 TABLET ORAL at 12:49

## 2024-02-01 RX ADMIN — METRONIDAZOLE 500 MG: 500 INJECTION, SOLUTION INTRAVENOUS at 12:49

## 2024-02-01 RX ADMIN — OXYCODONE HYDROCHLORIDE 10 MG: 5 TABLET ORAL at 18:18

## 2024-02-01 RX ADMIN — CIPROFLOXACIN 400 MG: 400 INJECTION, SOLUTION INTRAVENOUS at 20:44

## 2024-02-01 RX ADMIN — ENOXAPARIN SODIUM 40 MG: 40 INJECTION SUBCUTANEOUS at 15:06

## 2024-02-01 RX ADMIN — METRONIDAZOLE 500 MG: 500 INJECTION, SOLUTION INTRAVENOUS at 22:29

## 2024-02-01 RX ADMIN — OXYCODONE HYDROCHLORIDE 5 MG: 5 TABLET ORAL at 04:38

## 2024-02-01 RX ADMIN — ACETAMINOPHEN 975 MG: 325 TABLET, FILM COATED ORAL at 15:06

## 2024-02-01 RX ADMIN — SODIUM CHLORIDE, POTASSIUM CHLORIDE, SODIUM LACTATE AND CALCIUM CHLORIDE: 600; 310; 30; 20 INJECTION, SOLUTION INTRAVENOUS at 04:28

## 2024-02-01 ASSESSMENT — ACTIVITIES OF DAILY LIVING (ADL)
ADLS_ACUITY_SCORE: 24
ADLS_ACUITY_SCORE: 20
ADLS_ACUITY_SCORE: 20
ADLS_ACUITY_SCORE: 22
ADLS_ACUITY_SCORE: 20
ADLS_ACUITY_SCORE: 22
ADLS_ACUITY_SCORE: 20
ADLS_ACUITY_SCORE: 22
ADLS_ACUITY_SCORE: 20
ADLS_ACUITY_SCORE: 22

## 2024-02-01 NOTE — PLAN OF CARE
PRIMARY DIAGNOSIS: LAP SIGMOID RESECTION  OUTPATIENT/OBSERVATION GOALS TO BE MET BEFORE DISCHARGE:  1. Stable vital signs Yes  2. Tolerating diet:Yes  3. Pain controlled with oral pain medications:  Yes  4. Positive bowel sounds:  Yes  5. Voiding without difficulty:  Yes  6. Able to ambulate:  No  7. Provider specific discharge goals met:  Yes    Discharge Planner Nurse   Safe discharge environment identified: Yes  Barriers to discharge: No       Entered by: Guido Lind RN 01/31/2024 9:17 PM   Pt is A & O x 4, , Oxycodone, Dilaudid, Tylenol given for pain with relief, ice pack placed over abd, incision open to air cdi, EUN drain draining bright red  output, brand in place with adequate output, Pt stood up and took a few steps around, dressing cdi, clear liquid diet, LR @ 75 ml/hr, Flagyl & Cipro abx, CRS following.  Please review provider order for any additional goals.   Nurse to notify provider when observation goals have been met and patient is ready for discharge.Goal Outcome Evaluation:

## 2024-02-01 NOTE — ANESTHESIA POSTPROCEDURE EVALUATION
Patient: Marko Pastor    Procedure: Procedure(s):  Laparoscopic sigmoid colectomy  Colonoscopy       Anesthesia Type:  General    Note:     Postop Pain Control: Uneventful            Sign Out: Well controlled pain   PONV: No   Neuro/Psych: Uneventful            Sign Out: Acceptable/Baseline neuro status   Airway/Respiratory: Uneventful            Sign Out: Acceptable/Baseline resp. status   CV/Hemodynamics: Uneventful            Sign Out: Acceptable CV status   Other NRE: NONE   DID A NON-ROUTINE EVENT OCCUR? No           Last vitals:  Vitals Value Taken Time   /85 01/31/24 1825   Temp 96.8  F (36  C) 01/31/24 1755   Pulse 70 01/31/24 1827   Resp 18 01/31/24 1827   SpO2 99 % 01/31/24 1827   Vitals shown include unfiled device data.    Electronically Signed By: Alex Lagos MD  January 31, 2024  6:28 PM

## 2024-02-01 NOTE — PROGRESS NOTES
[facility]    Medicine Progress Note - Hospitalist Service    Date of Admission:  1/26/2024    Assessment & Plan     Marko Pastor is a 38 year old male who is Yakut speaking without significant past medical history other than diverticulosis and diverticulitis. He was just hospitalized and discharged 1/14/2024 for a stay for with diverticulitis and intra-abdominal abscess that was not amenable to drainage.  He was treated with Zosyn and transition to 10 days of Augmentin which he finished at home on 1/21/2025.  He did well for 3 or so days but developed worsening abdominal pain again.  He presented to the ED on 1/26/2024 for reevaluation.  ED evaluation showed stable vital signs.  His white blood count was up at over 12, hemoglobin is 14.3, and platelets 259.  His lactic acid was normal. CT scan of the abdomen was unchanged from his previous showing a contained perforation from his diverticulitis.  He was started on Cipro and Flagyl and admitted for further treatment of diverticulitis with contained perforation.  Colorectal surgery was consulted.  He was treated conservatively.  On 1/29/2024 colorectal surgery consulted interventional radiology who placed a drain in the abdominal abscess. On 1/30/24 colorectal surgery recommended colon prep for surgical treatment on 1/31/24.     Acute diverticulitis with contained perforation.  -Previously on Zosyn and Augmentin.  -Return to hospital due to increased pain.  -Found to have contained perforation.  -Has been on ciprofloxacin and metronidazole.  -Seen in consultation by colorectal surgery.  -Status post sigmoid resection on 1/31.  -Cultures with polymicrobial growth, including C. difficile.  -Continue ciprofloxacin and metronidazole for now.  -Infectious disease consult.  -Appreciate continued colorectal surgery input.  -Pain medications as needed.  -Use incentive spirometry.    Acute blood loss anemia.  -Recheck CBC  "tomorrow.    Moderate malnutrition.  -Registered dietitian following.  -Continue diet supplements.            Diet: Full Liquid Diet  Snacks/Supplements Adult: Ensure Enlive; With Meals    DVT Prophylaxis: Enoxaparin (Lovenox) SQ  Collins Catheter: Not present  Lines: None     Cardiac Monitoring: None  Code Status: Full Code      Clinically Significant Risk Factors          # Hypocalcemia: Lowest Ca = 8.4 mg/dL in last 2 days, will monitor and replace as appropriate      # Coagulation Defect: INR = 1.20 (Ref range: 0.85 - 1.15) and/or PTT = N/A, will monitor for bleeding           # Overweight: Estimated body mass index is 29.12 kg/m  as calculated from the following:    Height as of 1/12/24: 1.72 m (5' 7.72\").    Weight as of this encounter: 86.1 kg (189 lb 14.4 oz).   # Moderate Malnutrition: based on nutrition assessment           Disposition Plan      Expected Discharge Date: 02/02/2024,  3:00 PM                  Med Perry DO  Hospitalist Service    Securely message with [employer] (more info)  Text page via [facility] Paging/Directory   ______________________________________________________________________    Interval History   All information through .  Some abdominal pain.  Denies chest pain, shortness of breath, fevers, chills, nausea, vomiting.    Physical Exam   Vital Signs: Temp: 98.6  F (37  C) Temp src: Oral BP: 102/50 Pulse: 52   Resp: 16 SpO2: 100 % O2 Device: None (Room air) Oxygen Delivery: 8 LPM  Weight: 189 lbs 14.4 oz    Gen:  NAD, A&Ox3.  All information through .  Eyes:  PERRL, sclera anicteric.  OP:  MMM, no lesions.  Neck:  Supple.  CV:  Regular, no murmurs.  Lung:  CTA b/l, normal effort.  Ab:  +BS, soft.  Skin:  Warm, dry to touch.  No rash.  Ext:  No pitting edema LE b/l.      Medical Decision Making       37 MINUTES SPENT BY ME on the date of service doing chart review, history, exam, documentation & further activities per the note.  "     Data     I have personally reviewed the following data over the past 24 hrs:    N/A  \   12.3 (L)   / N/A     138 103 5.4 (L) /  111 (H)   3.6 27 0.69 \       Imaging results reviewed over the past 24 hrs:   No results found for this or any previous visit (from the past 24 hour(s)).

## 2024-02-01 NOTE — PROGRESS NOTES
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  Doing well. Pain controlled, no nausea.                Medications:   I have reviewed this patient's current medications               Physical Exam:   Blood pressure 97/54, pulse 53, temperature 97.7  F (36.5  C), temperature source Oral, resp. rate 16, weight 86.1 kg (189 lb 14.4 oz), SpO2 100%.    Intake/Output Summary (Last 24 hours) at 2/1/2024 0743  Last data filed at 2/1/2024 0700  Gross per 24 hour   Intake 2440 ml   Output 3480 ml   Net -1040 ml     GEN:  alert  ABD:  soft, round, EUN serosang         Data:        Lab Results   Component Value Date     02/01/2024    Lab Results   Component Value Date    CHLORIDE 103 02/01/2024    Lab Results   Component Value Date    BUN 5.4 02/01/2024      Lab Results   Component Value Date    POTASSIUM 3.6 02/01/2024    Lab Results   Component Value Date    CO2 27 02/01/2024    Lab Results   Component Value Date    CR 0.69 02/01/2024    CR 0.73 01/31/2024        Lab Results   Component Value Date    HGB 12.3 (L) 02/01/2024    HGB 13.3 01/31/2024     Lab Results   Component Value Date     01/31/2024     01/29/2024     Lab Results   Component Value Date    WBC 7.5 01/31/2024    WBC 8.5 01/29/2024            Assessment and Plan:     Doing well  Full liquids  Encourage ambulation  Remove brand  Lovenox now but will not need at discharge.         Sandy Dale MD  [facility]  Office Phone # [phone]

## 2024-02-02 ENCOUNTER — APPOINTMENT (OUTPATIENT)
Dept: INTERPRETER SERVICES | Facility: CLINIC | Age: 39
End: 2024-02-02

## 2024-02-02 LAB
BACTERIA ABSC ANAEROBE+AEROBE CULT: ABNORMAL
ERYTHROCYTE [DISTWIDTH] IN BLOOD BY AUTOMATED COUNT: 13.2 % (ref 10–15)
GLUCOSE SERPL-MCNC: 103 MG/DL (ref 70–99)
GRAM STAIN RESULT: ABNORMAL
HCT VFR BLD AUTO: 37.2 % (ref 40–53)
HGB BLD-MCNC: 12.3 G/DL (ref 13.3–17.7)
MCH RBC QN AUTO: 30.8 PG (ref 26.5–33)
MCHC RBC AUTO-ENTMCNC: 33.1 G/DL (ref 31.5–36.5)
MCV RBC AUTO: 93 FL (ref 78–100)
PATH REPORT.COMMENTS IMP SPEC: NORMAL
PATH REPORT.FINAL DX SPEC: NORMAL
PATH REPORT.GROSS SPEC: NORMAL
PATH REPORT.MICROSCOPIC SPEC OTHER STN: NORMAL
PATH REPORT.RELEVANT HX SPEC: NORMAL
PHOTO IMAGE: NORMAL
PLATELET # BLD AUTO: 265 10E3/UL (ref 150–450)
RBC # BLD AUTO: 4 10E6/UL (ref 4.4–5.9)
WBC # BLD AUTO: 8.3 10E3/UL (ref 4–11)

## 2024-02-02 PROCEDURE — 250N000011 HC RX IP 250 OP 636: Performed by: COLON & RECTAL SURGERY

## 2024-02-02 PROCEDURE — 120N000001 HC R&B MED SURG/OB

## 2024-02-02 PROCEDURE — 99254 IP/OBS CNSLTJ NEW/EST MOD 60: CPT | Performed by: INTERNAL MEDICINE

## 2024-02-02 PROCEDURE — 99231 SBSQ HOSP IP/OBS SF/LOW 25: CPT | Performed by: INTERNAL MEDICINE

## 2024-02-02 PROCEDURE — 250N000011 HC RX IP 250 OP 636: Performed by: INTERNAL MEDICINE

## 2024-02-02 PROCEDURE — 85027 COMPLETE CBC AUTOMATED: CPT | Performed by: INTERNAL MEDICINE

## 2024-02-02 PROCEDURE — 82947 ASSAY GLUCOSE BLOOD QUANT: CPT | Performed by: INTERNAL MEDICINE

## 2024-02-02 PROCEDURE — 250N000013 HC RX MED GY IP 250 OP 250 PS 637: Performed by: COLON & RECTAL SURGERY

## 2024-02-02 PROCEDURE — 36415 COLL VENOUS BLD VENIPUNCTURE: CPT | Performed by: INTERNAL MEDICINE

## 2024-02-02 RX ORDER — VANCOMYCIN HYDROCHLORIDE 125 MG/1
125 CAPSULE ORAL 4 TIMES DAILY
Status: DISCONTINUED | OUTPATIENT
Start: 2024-02-02 | End: 2024-02-02

## 2024-02-02 RX ORDER — METRONIDAZOLE 500 MG/100ML
500 INJECTION, SOLUTION INTRAVENOUS EVERY 12 HOURS
Status: DISCONTINUED | OUTPATIENT
Start: 2024-02-02 | End: 2024-02-05 | Stop reason: HOSPADM

## 2024-02-02 RX ORDER — PIPERACILLIN SODIUM, TAZOBACTAM SODIUM 3; .375 G/15ML; G/15ML
3.38 INJECTION, POWDER, LYOPHILIZED, FOR SOLUTION INTRAVENOUS EVERY 6 HOURS
Status: DISCONTINUED | OUTPATIENT
Start: 2024-02-02 | End: 2024-02-05 | Stop reason: HOSPADM

## 2024-02-02 RX ADMIN — METRONIDAZOLE 500 MG: 500 INJECTION, SOLUTION INTRAVENOUS at 05:57

## 2024-02-02 RX ADMIN — ACETAMINOPHEN 975 MG: 325 TABLET, FILM COATED ORAL at 05:57

## 2024-02-02 RX ADMIN — ACETAMINOPHEN 975 MG: 325 TABLET, FILM COATED ORAL at 21:35

## 2024-02-02 RX ADMIN — OXYCODONE HYDROCHLORIDE 10 MG: 5 TABLET ORAL at 08:31

## 2024-02-02 RX ADMIN — OXYCODONE HYDROCHLORIDE 10 MG: 5 TABLET ORAL at 02:07

## 2024-02-02 RX ADMIN — ENOXAPARIN SODIUM 40 MG: 40 INJECTION SUBCUTANEOUS at 17:11

## 2024-02-02 RX ADMIN — PIPERACILLIN AND TAZOBACTAM 3.38 G: 3; .375 INJECTION, POWDER, FOR SOLUTION INTRAVENOUS at 10:19

## 2024-02-02 RX ADMIN — HYDROMORPHONE HYDROCHLORIDE 0.2 MG: 0.2 INJECTION, SOLUTION INTRAMUSCULAR; INTRAVENOUS; SUBCUTANEOUS at 11:42

## 2024-02-02 RX ADMIN — HYDROMORPHONE HYDROCHLORIDE 0.4 MG: 0.2 INJECTION, SOLUTION INTRAMUSCULAR; INTRAVENOUS; SUBCUTANEOUS at 17:11

## 2024-02-02 RX ADMIN — ONDANSETRON 4 MG: 2 INJECTION INTRAMUSCULAR; INTRAVENOUS at 19:53

## 2024-02-02 RX ADMIN — CIPROFLOXACIN 400 MG: 400 INJECTION, SOLUTION INTRAVENOUS at 08:26

## 2024-02-02 RX ADMIN — OXYCODONE HYDROCHLORIDE 10 MG: 5 TABLET ORAL at 20:52

## 2024-02-02 RX ADMIN — OXYCODONE HYDROCHLORIDE 10 MG: 5 TABLET ORAL at 14:13

## 2024-02-02 RX ADMIN — ACETAMINOPHEN 975 MG: 325 TABLET, FILM COATED ORAL at 14:13

## 2024-02-02 RX ADMIN — METRONIDAZOLE 500 MG: 500 INJECTION, SOLUTION INTRAVENOUS at 17:11

## 2024-02-02 RX ADMIN — PIPERACILLIN AND TAZOBACTAM 3.38 G: 3; .375 INJECTION, POWDER, FOR SOLUTION INTRAVENOUS at 14:14

## 2024-02-02 RX ADMIN — PIPERACILLIN AND TAZOBACTAM 3.38 G: 3; .375 INJECTION, POWDER, FOR SOLUTION INTRAVENOUS at 20:55

## 2024-02-02 ASSESSMENT — ACTIVITIES OF DAILY LIVING (ADL)
ADLS_ACUITY_SCORE: 20

## 2024-02-02 NOTE — CONSULTS
[facility]    Infectious Disease Consultation     Date of Admission:  1/26/2024  Date of Consult (When I saw the patient): 02/02/24    Assessment & Plan   Marko Pastor is a 38 year old who was admitted on 1/26/2024.     Impression: 1 38-year-old male, ongoing diverticulitis with perforation and now abscess, cultures with Enterococcus, anaerobes including C. Difficile  2 no major diarrhea, unlikely to get C. difficile colitis from C. difficile in the abscess  3 anemia    REC 1 current micro not covered, switch to Zosyn, did not fail Zosyn/Augmentin previously simply has an abscess and needed drainage  2 needs Flagyl as the C. difficile likely not covered by Zosyn, no need for Vanco very unlikely to get C. difficile colitis from the C. difficile only abscess usually not toxin producing organism  3 does need enteric iso with the C. difficile colonized    4 mount type of antibiotics depends on final microbiology, success of drainage and clinical improvement        Nico El MD    Reason for Consult   Reason for consult: I was asked to evaluate this patient for abdominal abscess.    Primary Care Physician   Physician No Ref-Primary    Chief Complaint   Abdominal pain    History is obtained from the patient and medical records    History of Present Illness   Marko Pastor is a 38 year old male who presents with recent perforated diverticulitis, now with evolution of abscess previously had Zosyn and Augmentin.  Was switched over to Cipro and Flagyl based on not responding to those antibiotics but the actual reason is clearly undrained abscess.  Now drain tube in place draining fairly well cultures growing Enterococcus not covered by current antibiotics and also C. difficile.  He is not having major diarrhea or signs of true colitis.    Past Medical History   I have reviewed this patient's medical history and updated it with pertinent information if needed.   History  reviewed. No pertinent past medical history.    Past Surgical History   I have reviewed this patient's surgical history and updated it with pertinent information if needed.  Past Surgical History:   Procedure Laterality Date    COLONOSCOPY N/A 1/31/2024    Procedure: Colonoscopy;  Surgeon: Sandy Dale MD;  Location:  OR    LAPAROSCOPIC ASSISTED SIGMOID COLECTOMY N/A 1/31/2024    Procedure: Intraoperative colonoscopy, laparoscopic sigmoid resection;  Surgeon: Sandy Dale MD;  Location:  OR       Prior to Admission Medications   Prior to Admission Medications   Prescriptions Last Dose Informant Patient Reported? Taking?   docusate sodium (COLACE) 100 MG capsule   Yes Yes   Sig: Take 100 mg by mouth 2 times daily   oxyCODONE (ROXICODONE) 5 MG tablet 1/25/2024  No Yes   Sig: Take 1 tablet (5 mg) by mouth every 6 hours as needed for severe pain (IF pain not managed with non-pharmacological and non-opioid interventions)      Facility-Administered Medications: None     Allergies   No Known Allergies    Immunization History     There is no immunization history on file for this patient.    Social History   I have reviewed this patient's social history and updated it with pertinent information if needed. Marko Pastor  reports that he has never smoked. He has never used smokeless tobacco.    Family History   I have reviewed this patient's family history and updated it with pertinent information if needed.   History reviewed. No pertinent family history.    Review of Systems   The 10 point Review of Systems is negative except as listed above may be some mild systemic and fever symptoms but nothing major    Physical Exam   Temp: 98.4  F (36.9  C) Temp src: Oral BP: 123/69 Pulse: 57   Resp: 18 SpO2: 98 % O2 Device: None (Room air)    Vital Signs with Ranges  Temp:  [97.9  F (36.6  C)-98.8  F (37.1  C)] 98.4  F (36.9  C)  Pulse:  [57-66] 57  Resp:  [16-20] 18  BP: (100-123)/(41-69) 123/69  SpO2:   "[97 %-99 %] 98 %  190 lbs 6.4 oz  Body mass index is 29.19 kg/m .    GENERAL APPEARANCE:  awake  EYES: Eyes grossly normal to inspection  NECK: no adenopathy  RESP: lungs clear   CV: regular rates and rhythm  LYMPHATICS: normal ant/post cervical and supraclavicular nodes  ABDOMEN: Mildly tender some pain at the drain site  MS: extremities normal  SKIN: no suspicious lesions or rashes        Data   All laboratory and imaging data in the past 24 hours reviewed  No results for input(s): \"CULT\" in the last 168 hours.  No lab results found.    Invalid input(s): \"UC\"       All cultures:  Recent Labs   Lab 01/29/24  1353 01/26/24  1641 01/26/24  1640   CULTURE 1+ Enterococcus gallinarum*  1+ Enterococcus faecalis*  1+ Citrobacter amalonaticus*  1+ Normal neal  2+ Clostridioides difficile* No Growth No Growth      Blood culture:  Results for orders placed or performed during the hospital encounter of 01/26/24   Blood Culture Arm, Left    Specimen: Arm, Left; Blood   Result Value Ref Range    Culture No Growth    Blood Culture Peripheral Blood    Specimen: Peripheral Blood   Result Value Ref Range    Culture No Growth       Urine culture:  No results found for this or any previous visit.          "

## 2024-02-02 NOTE — PROGRESS NOTES
To Do:  End of Shift Summary  For vital signs and complete assessments, please see documentation flowsheets.     Pertinent assessments: Pt A&Ox4. Mongolian speaking. VSS, on RA. Afebrile. Denies nausea. Up SBA/ind with IV pool. 3 lap sites, liquid bandage open to air. Not passing gas yet, no BM. Rates pain at 8-9, Oxy 10mg x2, IV dilaudid x1, and tylenol given. EUN drain in place with adequate output. Voiding well. PIV SL. Flagyl and zosyn given.     Major Shift Events: none    Treatment Plan: IV flagyl and zosyn, pain management.     Bedside Nurse: Cecilia Clark RN

## 2024-02-02 NOTE — PLAN OF CARE
Goal Outcome Evaluation:      Pertinent assessments: Pt A&Ox4. Salvadorean speaking. VSS, on RA. Up SBA/ind with IV pool. 3 lap sites, liquid bandage open to air. LS clear. BS hypoactive. Not passing gas yet. Oxycodone and IV dilaudid given for pain. EUN drain 35 mls serosanguineous output. Collins out, voided 100 mls. Bladder scan 160 mls. PIV infusing LR @ 75 ml/hr. C/o nausea, zofran given once.    Major Shift Events: none  Treatment Plan: IV cipro/flagyl, ADAT. Pain management.   Bedside Nurse: Lois Avila RN

## 2024-02-02 NOTE — PROGRESS NOTES
COLON & RECTAL SURGERY  PROGRESS NOTE    February 2, 2024  Post-op Day # 2    SUBJECTIVE:  Doing okay, pain controlled with tylenol, oxy, and dilaudid, Had some nausea yesterday that resolved. Denies emesis. Tolerating diet, but has not tried fulls yet. Voiding spontaneously. Passed flatus this am, no BM yet. EUN with 225 ml serosanguinous output. IR drain culture came back positive for C. Difficile. WBC 8.3, Hgb 12.3. AVSS.    OBJECTIVE:  Temp:  [97.9  F (36.6  C)-98.8  F (37.1  C)] 98.4  F (36.9  C)  Pulse:  [57-66] 57  Resp:  [16-20] 18  BP: (100-123)/(41-69) 123/69  SpO2:  [97 %-99 %] 98 %    Intake/Output Summary (Last 24 hours) at 2/2/2024 0842  Last data filed at 2/2/2024 0549  Gross per 24 hour   Intake 340 ml   Output 1225 ml   Net -885 ml       GENERAL:  Awake, alert, no acute distress, sitting in chair  HEAD: Nomocephalic atraumatic  SCLERA: anicteric  EXTREMITIES: warm and well perfused  ABDOMEN:  Soft, appropriately tender, mildly distended, no rebound or guarding, no peritoneal signs. EUN drain intact.  INCISION:  C/d/i    LABS:  Lab Results   Component Value Date    WBC 8.3 02/02/2024     Lab Results   Component Value Date    HGB 12.3 02/02/2024     Lab Results   Component Value Date    HCT 37.2 02/02/2024     Lab Results   Component Value Date     02/02/2024     Last Basic Metabolic Panel:  Lab Results   Component Value Date     02/01/2024      Lab Results   Component Value Date    POTASSIUM 3.6 02/01/2024     Lab Results   Component Value Date    CHLORIDE 103 02/01/2024     Lab Results   Component Value Date    SANTOS 8.4 02/01/2024     Lab Results   Component Value Date    CO2 27 02/01/2024     Lab Results   Component Value Date    BUN 5.4 02/01/2024     Lab Results   Component Value Date    CR 0.69 02/01/2024     Lab Results   Component Value Date     02/02/2024       ASSESSMENT/PLAN: Marko Reyes is a 38-year-old man POD#2 s/p laparoscopic sigmoid resection for perforated  diverticulitis with colocutaneous drain/fistula. IR drain culture positive for C. Difficile.     -Continue full liquids  -Continue EUN drain  -PO vancomycin, appreciate ID consult for management of C. Difficile  -Pain management, minimize narcotics  -Encourage ambulation  -Lovenox for DVT ppx, will not need at discharge      For questions/paging, please contact the CRS office at [phone].    Zelalem Wilde PA-C  [facility]  Phone: [phone]     Colon and Rectal Surgery Attending Note    Patient seen and examined independently.  Agree with above assessment and plan.  Up in the chair,minimal pain.  EUN serosang  Drain culture + for Cif  Abd soft, incisions CDI  Plan  Full for breakfast with possible low fiber later.  Po vanco, IV flagyl and cipro.Get ID consult for abscess drain with CDIF.    Sandy Dale MD  [facility]  [address] [facility], [address]  [place] [address]  T: [phone]  F: [phone]   [email or web address]

## 2024-02-02 NOTE — PROGRESS NOTES
[facility]    Medicine Progress Note - Hospitalist Service    Date of Admission:  1/26/2024    Assessment & Plan     Marko Pastor is a 38 year old male who is Yoruba speaking without significant past medical history other than diverticulosis and diverticulitis. He was just hospitalized and discharged 1/14/2024 for a stay for with diverticulitis and intra-abdominal abscess that was not amenable to drainage.  He was treated with Zosyn and transition to 10 days of Augmentin which he finished at home on 1/21/2025.  He did well for 3 or so days but developed worsening abdominal pain again.  He presented to the ED on 1/26/2024 for reevaluation.  ED evaluation showed stable vital signs.  His white blood count was up at over 12, hemoglobin is 14.3, and platelets 259.  His lactic acid was normal. CT scan of the abdomen was unchanged from his previous showing a contained perforation from his diverticulitis.  He was started on Cipro and Flagyl and admitted for further treatment of diverticulitis with contained perforation.  Colorectal surgery was consulted.  He was treated conservatively.  On 1/29/2024 colorectal surgery consulted interventional radiology who placed a drain in the abdominal abscess. On 1/30/24 colorectal surgery recommended colon prep for surgical treatment on 1/31/24.      Acute diverticulitis with contained perforation.  -Previously on Zosyn, followed by Augmentin.  -Return to hospital due to increased pain.  -Found to have contained perforation.  -Has been on ciprofloxacin and metronidazole.  -Seen in consultation by colorectal surgery.  -Status post sigmoid resection on 1/31.  -Cultures with polymicrobial growth, including C. difficile.  -Infectious disease consult.  -Continue IV metronidazole.  -Ciprofloxacin changed to Zosyn.  -Appreciate continued colorectal surgery input.  -Pain medications as needed.  -Use incentive spirometry.     Acute blood loss  "anemia.  -Hemoglobin stable.  -Recheck intermittently.     Moderate malnutrition.  -Registered dietitian following.  -Continue diet supplements.             Diet: Full Liquid Diet  Snacks/Supplements Adult: Ensure Enlive; With Meals    DVT Prophylaxis: Enoxaparin (Lovenox) SQ  Collins Catheter: Not present  Lines: None     Cardiac Monitoring: None  Code Status: Full Code      Clinically Significant Risk Factors               # Coagulation Defect: INR = 1.20 (Ref range: 0.85 - 1.15) and/or PTT = N/A, will monitor for bleeding           # Overweight: Estimated body mass index is 29.19 kg/m  as calculated from the following:    Height as of 1/12/24: 1.72 m (5' 7.72\").    Weight as of this encounter: 86.4 kg (190 lb 6.4 oz).   # Moderate Malnutrition: based on nutrition assessment           Disposition Plan      Expected Discharge Date: 02/03/2024,  3:00 PM                  Med Perry DO  Hospitalist Service  [facility]  Securely message with [employer] (more info)  Text page via [employer] Paging/Directory   ______________________________________________________________________    Interval History   Some abdominal pain.  No nausea.  Denies chest pain, shortness of breath, fevers, chills.  All information obtained through .    Physical Exam   Vital Signs: Temp: 98.4  F (36.9  C) Temp src: Oral BP: 123/69 Pulse: 57   Resp: 18 SpO2: 98 % O2 Device: None (Room air)    Weight: 190 lbs 6.4 oz    Gen:  NAD, A&Ox3.  All information through .  Eyes:  PERRL, sclera anicteric.  OP:  MMM, no lesions.  Neck:  Supple.  CV:  Regular, no murmurs.  Lung:  CTA b/l, normal effort.  Ab:  +BS, soft.  Skin:  Warm, dry to touch.  No rash.  Ext:  No pitting edema LE b/l.      Medical Decision Making       33 MINUTES SPENT BY ME on the date of service doing chart review, history, exam, documentation & further activities per the note.      Data     I have personally reviewed the following data " over the past 24 hrs:    8.3  \   12.3 (L)   / 265     N/A N/A N/A /  103 (H)   N/A N/A N/A \       Imaging results reviewed over the past 24 hrs:   No results found for this or any previous visit (from the past 24 hour(s)).

## 2024-02-03 LAB — PLATELET # BLD AUTO: 301 10E3/UL (ref 150–450)

## 2024-02-03 PROCEDURE — 120N000001 HC R&B MED SURG/OB

## 2024-02-03 PROCEDURE — 85049 AUTOMATED PLATELET COUNT: CPT | Performed by: COLON & RECTAL SURGERY

## 2024-02-03 PROCEDURE — 36415 COLL VENOUS BLD VENIPUNCTURE: CPT | Performed by: COLON & RECTAL SURGERY

## 2024-02-03 PROCEDURE — 250N000013 HC RX MED GY IP 250 OP 250 PS 637: Performed by: COLON & RECTAL SURGERY

## 2024-02-03 PROCEDURE — 250N000011 HC RX IP 250 OP 636: Performed by: COLON & RECTAL SURGERY

## 2024-02-03 PROCEDURE — 99232 SBSQ HOSP IP/OBS MODERATE 35: CPT | Performed by: INTERNAL MEDICINE

## 2024-02-03 PROCEDURE — 250N000011 HC RX IP 250 OP 636: Performed by: INTERNAL MEDICINE

## 2024-02-03 RX ADMIN — OXYCODONE HYDROCHLORIDE 5 MG: 5 TABLET ORAL at 02:29

## 2024-02-03 RX ADMIN — PIPERACILLIN AND TAZOBACTAM 3.38 G: 3; .375 INJECTION, POWDER, FOR SOLUTION INTRAVENOUS at 02:31

## 2024-02-03 RX ADMIN — METRONIDAZOLE 500 MG: 500 INJECTION, SOLUTION INTRAVENOUS at 06:21

## 2024-02-03 RX ADMIN — ACETAMINOPHEN 650 MG: 325 TABLET, FILM COATED ORAL at 21:13

## 2024-02-03 RX ADMIN — METRONIDAZOLE 500 MG: 500 INJECTION, SOLUTION INTRAVENOUS at 18:34

## 2024-02-03 RX ADMIN — ACETAMINOPHEN 975 MG: 325 TABLET, FILM COATED ORAL at 13:44

## 2024-02-03 RX ADMIN — ENOXAPARIN SODIUM 40 MG: 40 INJECTION SUBCUTANEOUS at 16:43

## 2024-02-03 RX ADMIN — PIPERACILLIN AND TAZOBACTAM 3.38 G: 3; .375 INJECTION, POWDER, FOR SOLUTION INTRAVENOUS at 14:44

## 2024-02-03 RX ADMIN — PIPERACILLIN AND TAZOBACTAM 3.38 G: 3; .375 INJECTION, POWDER, FOR SOLUTION INTRAVENOUS at 09:37

## 2024-02-03 RX ADMIN — OXYCODONE HYDROCHLORIDE 10 MG: 5 TABLET ORAL at 16:57

## 2024-02-03 RX ADMIN — ACETAMINOPHEN 975 MG: 325 TABLET, FILM COATED ORAL at 06:20

## 2024-02-03 RX ADMIN — PIPERACILLIN AND TAZOBACTAM 3.38 G: 3; .375 INJECTION, POWDER, FOR SOLUTION INTRAVENOUS at 21:13

## 2024-02-03 RX ADMIN — OXYCODONE HYDROCHLORIDE 10 MG: 5 TABLET ORAL at 09:36

## 2024-02-03 ASSESSMENT — ACTIVITIES OF DAILY LIVING (ADL)
ADLS_ACUITY_SCORE: 20

## 2024-02-03 NOTE — PROGRESS NOTES
[facility]    Medicine Progress Note - Hospitalist Service    Date of Admission:  1/26/2024    Assessment & Plan     Marko Pastor is a 38 year old male who is Romanian speaking without significant past medical history other than diverticulosis and diverticulitis. He was just hospitalized and discharged 1/14/2024 for a stay for with diverticulitis and intra-abdominal abscess that was not amenable to drainage.  He was treated with Zosyn and transition to 10 days of Augmentin which he finished at home on 1/21/2025.  He did well for 3 or so days but developed worsening abdominal pain again.  He presented to the ED on 1/26/2024 for reevaluation.  ED evaluation showed stable vital signs.  His white blood count was up at over 12, hemoglobin is 14.3, and platelets 259.  His lactic acid was normal. CT scan of the abdomen was unchanged from his previous showing a contained perforation from his diverticulitis.  He was started on Cipro and Flagyl and admitted for further treatment of diverticulitis with contained perforation.  Colorectal surgery was consulted.  He was treated conservatively.  On 1/29/2024 colorectal surgery consulted interventional radiology who placed a drain in the abdominal abscess. On 1/30/24 colorectal surgery recommended colon prep for surgical treatment on 1/31/24.      Acute diverticulitis with contained perforation.  -Previously on Zosyn, followed by Augmentin.  -Return to hospital due to increased pain.  -Found to have contained perforation.  -Initially on ciprofloxacin and metronidazole.  -Seen in consultation by colorectal surgery.  -Status post sigmoid resection on 1/31.  -Cultures with polymicrobial growth, including C. difficile.  -Infectious disease consult appreciated.  -Continue IV metronidazole.  -Ciprofloxacin changed to Zosyn.  -Appreciate continued colorectal surgery input.  -Pain medications as needed.  -Use incentive spirometry.     Acute blood loss  "anemia.  -Hemoglobin stable.  -Recheck intermittently.     Moderate malnutrition.  -Registered dietitian following.  -Continue diet supplements.             Diet: Full Liquid Diet  Snacks/Supplements Adult: Ensure Enlive; With Meals    DVT Prophylaxis: Enoxaparin (Lovenox) SQ  Collins Catheter: Not present  Lines: None     Cardiac Monitoring: None  Code Status: Full Code      Clinically Significant Risk Factors                         # Overweight: Estimated body mass index is 29.19 kg/m  as calculated from the following:    Height as of 1/12/24: 1.72 m (5' 7.72\").    Weight as of this encounter: 86.4 kg (190 lb 6.4 oz).   # Moderate Malnutrition: based on nutrition assessment           Disposition Plan      Expected Discharge Date: 02/03/2024,  3:00 PM                  Med Perry DO  Hospitalist Service  [facility]  Securely message with [employer] (more info)  Text page via [employer] Paging/Directory   ______________________________________________________________________    Interval History   Old information through .  Some abdominal pain.  Denies chest pain, shortness of breath, fevers, chills, nausea, or vomiting.    Physical Exam   Vital Signs: Temp: 98.7  F (37.1  C) Temp src: Oral BP: 114/60 Pulse: 55   Resp: 18 SpO2: 98 % O2 Device: None (Room air)    Weight: 190 lbs 6.4 oz    Gen:  NAD, A&Ox3.  Eyes:  PERRL, sclera anicteric.  OP:  MMM, no lesions.  Neck:  Supple.  CV:  Regular, no murmurs.  Lung:  CTA b/l, normal effort.  Ab:  +BS, soft.  Skin:  Warm, dry to touch.  No rash.  Ext:  No pitting edema LE b/l.      Medical Decision Making       37 MINUTES SPENT BY ME on the date of service doing chart review, history, exam, documentation & further activities per the note.      Data     I have personally reviewed the following data over the past 24 hrs:    N/A  \   N/A   / 301     N/A N/A N/A /  N/A   N/A N/A N/A \       Imaging results reviewed over the past 24 hrs:   No " results found for this or any previous visit (from the past 24 hour(s)).

## 2024-02-03 NOTE — PROGRESS NOTES
[facility]  Infectious Disease Progress Note          Assessment and Plan:   Date of Admission:  1/26/2024  Date of Consult (When I saw the patient): 02/02/24        Assessment & Plan  Marko Pastor is a 38 year old who was admitted on 1/26/2024.      Impression: 1 38-year-old male, ongoing diverticulitis with perforation and now abscess, cultures with Enterococcus, anaerobes including C. Difficile  2 no major diarrhea, unlikely to get C. difficile colitis from C. difficile in the abscess  3 anemia     REC 1 Continue Zosyn, did not fail Zosyn/Augmentin previously simply has an abscess and needed drainage so far micro all well covered with this  2 needs Flagyl as the C. difficile likely not covered by Zosyn, no need for Vanco very unlikely to get C. difficile colitis from the C. difficile only abscess usually not toxin producing organism  3 does need enteric iso with the C. difficile colonized     4 amount and  type of antibiotics depends on final microbiology, success of drainage and clinical improvement              Interval History:     no new complaints and doing well; no cp, sob, n/v/d, or abd pain.  Significant drainage output, overall feels quite well              Medications:      enoxaparin ANTICOAGULANT  40 mg Subcutaneous Q24H    metroNIDAZOLE  500 mg Intravenous Q12H    piperacillin-tazobactam  3.375 g Intravenous Q6H    sodium chloride (PF)  3 mL Intracatheter Q8H                  Physical Exam:   Blood pressure 114/60, pulse 55, temperature 98.7  F (37.1  C), temperature source Oral, resp. rate 18, weight 86.4 kg (190 lb 6.4 oz), SpO2 98%.  Wt Readings from Last 2 Encounters:   02/02/24 86.4 kg (190 lb 6.4 oz)   01/14/24 57 kg (125 lb 9.6 oz)     Vital Signs with Ranges  Temp:  [98  F (36.7  C)-98.7  F (37.1  C)] 98.7  F (37.1  C)  Pulse:  [52-55] 55  Resp:  [18-20] 18  BP: ()/(49-60) 114/60  SpO2:  [98 %] 98 %    Constitutional: Awake, alert, cooperative, no  "apparent distress     Lungs: Clear to auscultation bilaterally, no crackles or wheezing   Cardiovascular: Regular rate and rhythm, normal S1 and S2, and no murmur noted   Abdomen: Normal bowel sounds, soft, non-distended, non-tender   Skin: No rashes, no cyanosis, no edema   Other:           Data:   All microbiology laboratory data reviewed.  Recent Labs   Lab Test 02/03/24  0748 02/02/24  0712 02/01/24  0653 01/31/24  0743 01/29/24  0715   WBC  --  8.3  --  7.5 8.5   HGB  --  12.3* 12.3* 13.3 13.5   HCT  --  37.2*  --  40.6 39.8*   MCV  --  93  --  93 92    265  --  259 242     Recent Labs   Lab Test 02/01/24  0653 01/31/24  0743 01/27/24  0723   CR 0.69 0.73 0.72     No lab results found.  No lab results found.    Invalid input(s): \"UC\"     "

## 2024-02-03 NOTE — PLAN OF CARE
Goal Outcome Evaluation:      Plan of Care Reviewed With: patient    Pt alert and oriented x4. On RA. C/o abdominal pain, PO Oxycodone given x 2. Had nausea, PRN IV Zofran given x 1. Up with SBA. Voiding adequately, had 1 BM this shift. PIV X 2. Abx (Flagyl) infusing. EUN draining adequately, minimal blood clot noted.Tolerating diet. Abdominal incisions CDI.

## 2024-02-03 NOTE — PROGRESS NOTES
Colon and Rectal Surgery  Daily Progress Note    POD 3    Subjective  Doing ok, still with discomfort and pain with mobility. Had some nausea overnight, none now. Does report flatus yesterday. Has been ambulating.      via video monitor present.    Objective  Intake/Output last 24 hrs:    Intake/Output Summary (Last 24 hours) at 2/3/2024 0637  Last data filed at 2/3/2024 0631  Gross per 24 hour   Intake --   Output 180 ml   Net -180 ml     Temp:  [98  F (36.7  C)-98.5  F (36.9  C)] 98  F (36.7  C)  Pulse:  [52-57] 52  Resp:  [18-20] 18  BP: ()/(49-69) 96/49  SpO2:  [98 %] 98 %    Physical Exam:  General: awake, alert,  in no acute distress  Respiratory: non-labored breathing  Abdomen: soft, appropriately tender, non-distended               Incisions: clean, dry and intact   Drain SS  Musculoskeletal: moves all four extremities equally; no calf edema or tenderness  Psychological: alert and oriented, answers questions appropriately    Pertinent Labs  Lab Results: No new    Assessment/Plan: This is a 38 year old male POD #3 s/p laparoscopic sigmoid resection for diverticulitis with colocutaneous fistula/drain. C diff cultured from drain.    FLD still today given nausea. If this resolves can adv to LFD later today.  Continue drain  IV Flagyl, Zosybernard subramanian ID. Appreciate recs.   DVT ppx.   Encourage ambulation.    For questions/paging, please contact the CRS office at [phone].    Ai Peralta MD  Colon and Rectal Surgery    [facility]  [address]  [place] [address]  T: [phone]  F: [phone]

## 2024-02-03 NOTE — PROGRESS NOTES
To Do:  End of Shift Summary  For vital signs and complete assessments, please see documentation flowsheets.     Pertinent assessments: Pt A&Ox4. Citizen of [place] speaking. VSS, on RA. Afebrile. Denies nausea. Up SBA/ind with IV pool. 3 lap sites, liquid bandage open to air. EUN drain in place with adequate output. Dressing changed today. Rates pain at 8-9, Oxy 10mg x1, and tylenol given. Voiding well. Full liquid diet, tolerating well, plan to advance to low fiber this evening. PIV SL. Zosyn given. Showered this shift.     Major Shift Events: none    Treatment Plan: IV flagyl and zosyn, pain management.     Bedside Nurse: Cecilia Clark RN

## 2024-02-04 PROCEDURE — 99232 SBSQ HOSP IP/OBS MODERATE 35: CPT | Performed by: INTERNAL MEDICINE

## 2024-02-04 PROCEDURE — 250N000013 HC RX MED GY IP 250 OP 250 PS 637: Performed by: COLON & RECTAL SURGERY

## 2024-02-04 PROCEDURE — 120N000001 HC R&B MED SURG/OB

## 2024-02-04 PROCEDURE — 250N000011 HC RX IP 250 OP 636: Performed by: INTERNAL MEDICINE

## 2024-02-04 PROCEDURE — 250N000011 HC RX IP 250 OP 636: Performed by: COLON & RECTAL SURGERY

## 2024-02-04 RX ADMIN — PIPERACILLIN AND TAZOBACTAM 3.38 G: 3; .375 INJECTION, POWDER, FOR SOLUTION INTRAVENOUS at 20:58

## 2024-02-04 RX ADMIN — OXYCODONE HYDROCHLORIDE 5 MG: 5 TABLET ORAL at 11:30

## 2024-02-04 RX ADMIN — ACETAMINOPHEN 650 MG: 325 TABLET, FILM COATED ORAL at 17:51

## 2024-02-04 RX ADMIN — ENOXAPARIN SODIUM 40 MG: 40 INJECTION SUBCUTANEOUS at 15:21

## 2024-02-04 RX ADMIN — PIPERACILLIN AND TAZOBACTAM 3.38 G: 3; .375 INJECTION, POWDER, FOR SOLUTION INTRAVENOUS at 09:36

## 2024-02-04 RX ADMIN — PIPERACILLIN AND TAZOBACTAM 3.38 G: 3; .375 INJECTION, POWDER, FOR SOLUTION INTRAVENOUS at 03:08

## 2024-02-04 RX ADMIN — PIPERACILLIN AND TAZOBACTAM 3.38 G: 3; .375 INJECTION, POWDER, FOR SOLUTION INTRAVENOUS at 15:21

## 2024-02-04 RX ADMIN — METRONIDAZOLE 500 MG: 500 INJECTION, SOLUTION INTRAVENOUS at 06:14

## 2024-02-04 RX ADMIN — ACETAMINOPHEN 650 MG: 325 TABLET, FILM COATED ORAL at 09:36

## 2024-02-04 RX ADMIN — METRONIDAZOLE 500 MG: 500 INJECTION, SOLUTION INTRAVENOUS at 17:44

## 2024-02-04 RX ADMIN — OXYCODONE HYDROCHLORIDE 5 MG: 5 TABLET ORAL at 20:54

## 2024-02-04 RX ADMIN — OXYCODONE HYDROCHLORIDE 10 MG: 5 TABLET ORAL at 03:22

## 2024-02-04 ASSESSMENT — ACTIVITIES OF DAILY LIVING (ADL)
ADLS_ACUITY_SCORE: 20

## 2024-02-04 NOTE — PLAN OF CARE
Pertinent assessments: Pt A&Ox4. VSS on RA. Jordanian speaking, [employer]  services utilized. Denies SOB, N/V. Reported abd pain, prn PO oxy given x1. Up independent in room. Tolerating full liquid diet. Abd lap sites x3 liquid bandage open to air, EUN drain pink tinged output with few clots, dressing CDI. PIV x2 SL. IV Zosyn and IV Flagyl given.     Major Shift Events: uneventful    Treatment Plan: IV Zosyn, IV Flagyl, Pain mangement, ID, Diet advancement    Bedside Nurse: Kathy Figueroa RN         Goal Outcome Evaluation:      Plan of Care Reviewed With: patient    Overall Patient Progress: improvingOverall Patient Progress: improving    Outcome Evaluation: pt reported abd pain, prn oxy given

## 2024-02-04 NOTE — PLAN OF CARE
End of Shift Summary  For vital signs and complete assessments, please see documentation flowsheets.     Pertinent assessments: Pt A&Ox4. On RA. Lao speaking, [employer]  services utilized. Independent in room. Tolerating full liquid diet. Abd lap sites x3 liquid bandage open to air, EUN drain pink tinged output with few clots, dressing CDI. Given PRN PO oxycodone for pain, pt reports pain worsens with movement, reminded to avoid using abdominal muscles as well. Ice applied to abdomen, given PRN tylenol for pain. Tolerating full liquid diet. PIV x2 SL.   Major Shift Events: none  Treatment Plan: IV abx, Pain mangement, ID, advance diet  Bedside Nurse: Marialuisa Downing RN

## 2024-02-04 NOTE — PROGRESS NOTES
COLON & RECTAL SURGERY PROGRESS NOTE  POD# 4    S:  reports pain better, nausea improved, tolerated liquids and hungry this AM. Having flatus and Bms. Ambulating     Vitals:  Vitals:    02/03/24 1557 02/04/24 0014 02/04/24 0536 02/04/24 0745   BP: 100/52 108/55  108/62   BP Location: Right arm Right arm  Right arm   Pulse: (!) 49 55  53   Resp: 15 20  20   Temp: 98.7  F (37.1  C) 98.4  F (36.9  C)  98.4  F (36.9  C)   TempSrc: Oral Oral  Oral   SpO2: 99% 97%  98%   Weight:   188 lb 3.2 oz      I/O:  I/O last 3 completed shifts:  In: 3 [I.V.:3]  Out: 75 [Drains:75]    PE:  General Appearance: no acute distress, alert and oriented   Abdomen: soft, tender to palpation in LLQ, no rebound or guarding, drain serosang, incisions clean and dry     Labs:  Recent Labs   Lab 02/03/24  0748 02/02/24  0712 02/01/24  0653 01/31/24  0743 01/29/24  0715   WBC  --  8.3  --  7.5 8.5   HGB  --  12.3* 12.3* 13.3 13.5    265  --  259 242   NA  --   --  138 138  --    POTASSIUM  --   --  3.6 3.8  --    CHLORIDE  --   --  103 102  --    CO2  --   --  27 28  --    BUN  --   --  5.4* 3.1*  --    CR  --   --  0.69 0.73  --    GLC  --  103* 111* 105*  --    INR  --   --   --  1.20*  --    AST  --   --   --  20  --    ALT  --   --   --  15  --      A/P: 38YM POD#4 from lap sigmoid resection for diverticulitis, with C.diff. Will advance to LFD given overall improvement. Continue drain and will continue IV antibiotics per ID.     Pending diet advancement, pain control and abx plan can start discharge planning for next 1-2 days.     Visit conducted with assistance of telephone .     For questions/paging, please contact the CRS office at [phone].     Shannon Goodwin MD Colorectal Surgery Fellow  [facility]  [address]  [place] [address]  T: [phone]  F: [phone]   [email or web address]

## 2024-02-04 NOTE — PROGRESS NOTES
[facility]    Medicine Progress Note - Hospitalist Service    Date of Admission:  1/26/2024    Assessment & Plan     Marko Pastor is a 38 year old male who is Polish speaking without significant past medical history other than diverticulosis and diverticulitis. He was just hospitalized and discharged 1/14/2024 for a stay for with diverticulitis and intra-abdominal abscess that was not amenable to drainage.  He was treated with Zosyn and transition to 10 days of Augmentin which he finished at home on 1/21/2025.  He did well for 3 or so days but developed worsening abdominal pain again.  He presented to the ED on 1/26/2024 for reevaluation.  ED evaluation showed stable vital signs.  His white blood count was up at over 12, hemoglobin is 14.3, and platelets 259.  His lactic acid was normal. CT scan of the abdomen was unchanged from his previous showing a contained perforation from his diverticulitis.  He was started on Cipro and Flagyl and admitted for further treatment of diverticulitis with contained perforation.  Colorectal surgery was consulted.  He was treated conservatively.  On 1/29/2024 colorectal surgery consulted interventional radiology who placed a drain in the abdominal abscess. On 1/30/24 colorectal surgery recommended colon prep for surgical treatment on 1/31/24.      Acute diverticulitis with contained perforation.  -Previously on Zosyn, followed by Augmentin.  -Returned to hospital due to increased pain.  -Found to have contained perforation.  -Initially on ciprofloxacin and metronidazole during this hospital stay.  -Seen in consultation by colorectal surgery.  -Status post sigmoid resection on 1/31.  -Cultures with polymicrobial growth, including C. difficile.  -Infectious disease consult appreciated.  -Continue IV metronidazole.  -Ciprofloxacin changed to Zosyn.  -Appreciate continued colorectal surgery input.  -Pain medications as needed.  -Use incentive  "spirometry.     Acute blood loss anemia.  -Hemoglobin stable.  -Recheck intermittently.     Moderate malnutrition.  -Registered dietitian following.  -Continue diet supplements.             Diet: Snacks/Supplements Adult: Ensure Enlive; With Meals  Low Fiber Diet    DVT Prophylaxis: Enoxaparin (Lovenox) SQ  Collins Catheter: Not present  Lines: None     Cardiac Monitoring: None  Code Status: Full Code      Clinically Significant Risk Factors                         # Overweight: Estimated body mass index is 28.86 kg/m  as calculated from the following:    Height as of 1/12/24: 1.72 m (5' 7.72\").    Weight as of this encounter: 85.4 kg (188 lb 3.2 oz).   # Moderate Malnutrition: based on nutrition assessment           Disposition Plan             Med Perry DO  Hospitalist Service  [facility]  Securely message with [employer] (more info)  Text page via [employer] Paging/Directory   ______________________________________________________________________    Interval History   All information through .  Some abdominal pain.  Denies chest pain, shortness of breath, fevers, chills, nausea, vomiting.    Physical Exam   Vital Signs: Temp: 98.4  F (36.9  C) Temp src: Oral BP: 108/62 Pulse: 53   Resp: 20 SpO2: 98 % O2 Device: None (Room air)    Weight: 188 lbs 3.2 oz    Gen:  NAD, A&Ox3.  All information to her .  Eyes:  PERRL, sclera anicteric.  OP:  MMM, no lesions.  Neck:  Supple.  CV:  Regular, no murmurs.  Lung:  CTA b/l, normal effort.  Ab: Percutaneous abdominal drain in place, +BS, soft.  Skin:  Warm, dry to touch.  No rash.  Ext:  No pitting edema LE b/l.      Medical Decision Making       30 MINUTES SPENT BY ME on the date of service doing chart review, history, exam, documentation & further activities per the note.      Data         Imaging results reviewed over the past 24 hrs:   No results found for this or any previous visit (from the past 24 hour(s)).  "

## 2024-02-04 NOTE — PROGRESS NOTES
[facility]  Infectious Disease Progress Note          Assessment and Plan:   Date of Admission:  1/26/2024  Date of Consult (When I saw the patient): 02/02/24        Assessment & Plan  Marko Pastor is a 38 year old who was admitted on 1/26/2024.      Impression: 1 38-year-old male, ongoing diverticulitis with perforation and now abscess, cultures with Enterococcus, anaerobes including C. Difficile  2 no major diarrhea, unlikely to get C. difficile colitis from C. difficile in the abscess  3 anemia     REC 1 Continue Zosyn, did not fail Zosyn/Augmentin previously simply has an abscess and needed drainage so far micro all well covered with this  2 needs Flagyl as the C. difficile likely not covered by Zosyn, no need for Vanco very unlikely to get C. difficile colitis from the C. difficile only abscess usually not toxin producing organism  3 does need enteric iso with the C. difficile colonized     4 amount and  type of antibiotics depends on final microbiology, success of drainage and clinical improvement still significant output, overall clinically improving              Interval History:     no new complaints and doing well; no cp, sob, n/v/d, or abd pain.  Significant drainage output 75 mL last day, overall feels quite well no fever, cultures same, white count to normal              Medications:      enoxaparin ANTICOAGULANT  40 mg Subcutaneous Q24H    metroNIDAZOLE  500 mg Intravenous Q12H    piperacillin-tazobactam  3.375 g Intravenous Q6H    sodium chloride (PF)  3 mL Intracatheter Q8H                  Physical Exam:   Blood pressure 108/62, pulse 53, temperature 98.4  F (36.9  C), temperature source Oral, resp. rate 20, weight 85.4 kg (188 lb 3.2 oz), SpO2 98%.  Wt Readings from Last 2 Encounters:   02/04/24 85.4 kg (188 lb 3.2 oz)   01/14/24 57 kg (125 lb 9.6 oz)     Vital Signs with Ranges  Temp:  [98.4  F (36.9  C)-98.7  F (37.1  C)] 98.4  F (36.9  C)  Pulse:  [49-55]  "53  Resp:  [15-20] 20  BP: (100-108)/(52-62) 108/62  SpO2:  [97 %-99 %] 98 %    Constitutional: Awake, alert, cooperative, no apparent distress     Lungs: Clear to auscultation bilaterally, no crackles or wheezing   Cardiovascular: Regular rate and rhythm, normal S1 and S2, and no murmur noted   Abdomen: Normal bowel sounds, soft, non-distended, non-tender   Skin: No rashes, no cyanosis, no edema   Other:           Data:   All microbiology laboratory data reviewed.  Recent Labs   Lab Test 02/03/24  0748 02/02/24  0712 02/01/24  0653 01/31/24  0743 01/29/24  0715   WBC  --  8.3  --  7.5 8.5   HGB  --  12.3* 12.3* 13.3 13.5   HCT  --  37.2*  --  40.6 39.8*   MCV  --  93  --  93 92    265  --  259 242     Recent Labs   Lab Test 02/01/24  0653 01/31/24  0743 01/27/24  0723   CR 0.69 0.73 0.72     No lab results found.  No lab results found.    Invalid input(s): \"UC\"     "

## 2024-02-05 VITALS
BODY MASS INDEX: 28.86 KG/M2 | HEART RATE: 54 BPM | DIASTOLIC BLOOD PRESSURE: 58 MMHG | WEIGHT: 188.2 LBS | TEMPERATURE: 98.2 F | OXYGEN SATURATION: 97 % | SYSTOLIC BLOOD PRESSURE: 102 MMHG | RESPIRATION RATE: 20 BRPM

## 2024-02-05 LAB
BACTERIA ABSC ANAEROBE+AEROBE CULT: ABNORMAL
BACTERIA ABSC ANAEROBE+AEROBE CULT: ABNORMAL
CREAT SERPL-MCNC: 0.73 MG/DL (ref 0.67–1.17)
EGFRCR SERPLBLD CKD-EPI 2021: >90 ML/MIN/1.73M2

## 2024-02-05 PROCEDURE — 99239 HOSP IP/OBS DSCHRG MGMT >30: CPT | Performed by: STUDENT IN AN ORGANIZED HEALTH CARE EDUCATION/TRAINING PROGRAM

## 2024-02-05 PROCEDURE — 36415 COLL VENOUS BLD VENIPUNCTURE: CPT | Performed by: INTERNAL MEDICINE

## 2024-02-05 PROCEDURE — 250N000011 HC RX IP 250 OP 636: Performed by: INTERNAL MEDICINE

## 2024-02-05 PROCEDURE — 250N000013 HC RX MED GY IP 250 OP 250 PS 637: Performed by: COLON & RECTAL SURGERY

## 2024-02-05 PROCEDURE — 82565 ASSAY OF CREATININE: CPT | Performed by: INTERNAL MEDICINE

## 2024-02-05 PROCEDURE — 99232 SBSQ HOSP IP/OBS MODERATE 35: CPT | Performed by: INTERNAL MEDICINE

## 2024-02-05 RX ORDER — OXYCODONE HYDROCHLORIDE 5 MG/1
5 TABLET ORAL EVERY 6 HOURS PRN
Qty: 15 TABLET | Refills: 0 | Status: SHIPPED | OUTPATIENT
Start: 2024-02-05

## 2024-02-05 RX ORDER — METRONIDAZOLE 500 MG/1
500 TABLET ORAL 3 TIMES DAILY
Qty: 28 TABLET | Refills: 0 | Status: SHIPPED | OUTPATIENT
Start: 2024-02-05

## 2024-02-05 RX ORDER — SULFAMETHOXAZOLE/TRIMETHOPRIM 800-160 MG
1 TABLET ORAL 2 TIMES DAILY
Qty: 28 TABLET | Refills: 0 | Status: SHIPPED | OUTPATIENT
Start: 2024-02-05

## 2024-02-05 RX ADMIN — PIPERACILLIN AND TAZOBACTAM 3.38 G: 3; .375 INJECTION, POWDER, FOR SOLUTION INTRAVENOUS at 02:36

## 2024-02-05 RX ADMIN — ACETAMINOPHEN 650 MG: 325 TABLET, FILM COATED ORAL at 14:40

## 2024-02-05 RX ADMIN — OXYCODONE HYDROCHLORIDE 5 MG: 5 TABLET ORAL at 06:25

## 2024-02-05 RX ADMIN — OXYCODONE HYDROCHLORIDE 5 MG: 5 TABLET ORAL at 01:49

## 2024-02-05 RX ADMIN — ACETAMINOPHEN 650 MG: 325 TABLET, FILM COATED ORAL at 06:25

## 2024-02-05 RX ADMIN — PIPERACILLIN AND TAZOBACTAM 3.38 G: 3; .375 INJECTION, POWDER, FOR SOLUTION INTRAVENOUS at 08:35

## 2024-02-05 RX ADMIN — ACETAMINOPHEN 650 MG: 325 TABLET, FILM COATED ORAL at 01:49

## 2024-02-05 RX ADMIN — METRONIDAZOLE 500 MG: 500 INJECTION, SOLUTION INTRAVENOUS at 05:30

## 2024-02-05 ASSESSMENT — ACTIVITIES OF DAILY LIVING (ADL)
ADLS_ACUITY_SCORE: 20
ADLS_ACUITY_SCORE: 22
ADLS_ACUITY_SCORE: 20
ADLS_ACUITY_SCORE: 22
ADLS_ACUITY_SCORE: 22
ADLS_ACUITY_SCORE: 20
ADLS_ACUITY_SCORE: 22

## 2024-02-05 NOTE — PLAN OF CARE
Goal Outcome Evaluation:      Plan of Care Reviewed With: patient    Overall Patient Progress: improvingOverall Patient Progress: improving    Pertinent assessments: Aox4. VSS.  Denies sob and nausea. Tolerating Oxy and Tylenol for pain. Turkish speaking needs . PIV SL. Pt taking IV Zosyn and Flagyl. Up ad jim. Low fiber diet. EUN drain intact with light yellow/clots output.    Major Shift Events None    Treatment Plan: IV Flagyl and zosyn, Pain mangement, ID.     Bedside Nurse: Liyah Larson RN

## 2024-02-05 NOTE — PROGRESS NOTES
COLON & RECTAL SURGERY PROGRESS NOTE  POD# 5    S: Feels well this morning, pain well controlled. Had a BM earlier this morning. Tolerating low fiber diet -- very transient mild nausea but none currently. Has been walking.    Vitals:  Vitals:    02/04/24 0536 02/04/24 0745 02/04/24 1531 02/04/24 2329   BP:  108/62 128/63 106/41   BP Location:  Right arm Right arm Left arm   Cuff Size:   Adult Regular    Pulse:  53 52 58   Resp:  20 19 16   Temp:  98.4  F (36.9  C) 98.1  F (36.7  C) 98.4  F (36.9  C)   TempSrc:  Oral Oral Oral   SpO2:  98% 92% 99%   Weight: 85.4 kg (188 lb 3.2 oz)        I/O:  I/O last 3 completed shifts:  In: -   Out: 130 [Drains:130]    PE:  General Appearance: no acute distress, alert and oriented, sitting in chair comfortably  Abdomen: soft, minimally and appropriately tender, nondistended, EUN drain light serosang, incisions clean and dry w dermabond    Labs:  Recent Labs   Lab 02/03/24  0748 02/02/24  0712 02/01/24  0653 01/31/24  0743 01/29/24  0715   WBC  --  8.3  --  7.5 8.5   HGB  --  12.3* 12.3* 13.3 13.5    265  --  259 242   NA  --   --  138 138  --    POTASSIUM  --   --  3.6 3.8  --    CHLORIDE  --   --  103 102  --    CO2  --   --  27 28  --    BUN  --   --  5.4* 3.1*  --    CR  --   --  0.69 0.73  --    GLC  --  103* 111* 105*  --    INR  --   --   --  1.20*  --    AST  --   --   --  20  --    ALT  --   --   --  15  --      A/P: 38YM POD#5 from lap sigmoid resection for diverticulitis, with C.diff growing from abscess cavity but no evidence of C diff colitis (having solid Bms). Will advance to LFD given overall improvement. Ready for discharge today from a surgical perspective with EUN drain in place. Defer to ID re: timing of transition to PO abx and PO antibiotic regimen.    - advance to low fiber diet  - continue IV zosyn and IV flagyl for now; appreciate ID recs re: potential PO abx regimen for home  - ok to discharge home from a surgical perspective with EUN drain in  place    Patient d/w Dr. Sandy Dale.      Tyrone Del Valle MD, MS  Fellow, Colon & Rectal Surgery  [facility]  02/05/2024  6:52 AM    Colorectal Surgery can be reached at [phone] at all times. Between 5pm and 7am you will be connected to the on-call physician               ADDENDUM:  Length of stay: 5 days  Indicate Y or N for the following:  UTI  No  C diff  No  PNA  No  SSI No  DVT No  PE  No  CVA No  MI No  Enterocutaneous fistula  No  Peripheral nerve injury  No  Abscess (not adjacent to anastomosis)  No  Leak No    Treated with:   Antibiotics N/A   Drain  N/A   Reoperation  N/A  Death within 30 days No  Reintubation  No  Reoperation  No   Procedure N/A    Colon and Rectal Surgery Attending Note    Patient seen and examined independently.  Agree with above assessment and plan.  Feeling much better.  Small bowel movement.  No bleeding.  EUN was 150 serous.    Abdomen: Soft, nontender, nondistended.  Incision clean dry and intact.  EUN serous.    Plan: Home on antibiotics per ID.  Will leave drain in place given the polymicrobial drain output before surgery.  Patient will follow-up with PA later in the week or next week depending on drain output.    He was instructed to call if he has increased pain, nausea, vomiting, fevers, chills, or other concerns.      Sandy Dale MD  [facility]  [address] [place] [address], [address]  [place] [address]  T: [phone]  F: [phone]   [email or web address]

## 2024-02-05 NOTE — PROGRESS NOTES
[facility]  Infectious Disease Progress Note          Assessment and Plan:   Date of Admission:  1/26/2024  Date of Consult (When I saw the patient): 02/02/24        Assessment & Plan  Marko Pastor is a 38 year old who was admitted on 1/26/2024.      Impression: 1 38-year-old male, ongoing diverticulitis with perforation and now abscess, cultures with Enterococcus, anaerobes including C. Difficile  2 no major diarrhea, unlikely to get C. difficile colitis from C. difficile in the abscess  3 anemia     REC 1 Continue Zosyn, did not fail Zosyn/Augmentin previously simply has an abscess and needed drainage so far micro all well covered with this  2 needs Flagyl as the C. difficile likely not covered by Zosyn, no need for Vanco very unlikely to get C. difficile colitis from the C. difficile only abscess usually not toxin producing organism  3 does need enteric iso with the C. difficile colonized     4 still significant output, per colorectal possibly discharge with follow-up imaging and possible drain pull when lessens.  Could treat this orally would do Augmentin 875 twice daily, Bactrim DS 1 tab p.o. twice daily and Flagyl 500 twice daily all 3 for 2 weeks, exact duration will be dependent on success of drainage and timing of drain pulling, no specific follow-up with us but will follow for drain plan              Interval History:     no new complaints and doing well; no cp, sob, n/v/d, or abd pain.  Significant drainage output 115 mL last day, overall feels quite well no fever, cultures same, white count to normal              Medications:      enoxaparin ANTICOAGULANT  40 mg Subcutaneous Q24H    metroNIDAZOLE  500 mg Intravenous Q12H    piperacillin-tazobactam  3.375 g Intravenous Q6H    sodium chloride (PF)  3 mL Intracatheter Q8H                  Physical Exam:   Blood pressure 102/58, pulse 54, temperature 98.2  F (36.8  C), temperature source Oral, resp. rate 20, weight 85.4 kg  "(188 lb 3.2 oz), SpO2 97%.  Wt Readings from Last 2 Encounters:   02/04/24 85.4 kg (188 lb 3.2 oz)   01/14/24 57 kg (125 lb 9.6 oz)     Vital Signs with Ranges  Temp:  [98.1  F (36.7  C)-98.4  F (36.9  C)] 98.2  F (36.8  C)  Pulse:  [52-58] 54  Resp:  [16-20] 20  BP: (102-128)/(41-63) 102/58  SpO2:  [92 %-99 %] 97 %    Constitutional: Awake, alert, cooperative, no apparent distress     Lungs: Clear to auscultation bilaterally, no crackles or wheezing   Cardiovascular: Regular rate and rhythm, normal S1 and S2, and no murmur noted   Abdomen: Normal bowel sounds, soft, non-distended, non-tender   Skin: No rashes, no cyanosis, no edema   Other:           Data:   All microbiology laboratory data reviewed.  Recent Labs   Lab Test 02/03/24  0748 02/02/24  0712 02/01/24  0653 01/31/24  0743 01/29/24  0715   WBC  --  8.3  --  7.5 8.5   HGB  --  12.3* 12.3* 13.3 13.5   HCT  --  37.2*  --  40.6 39.8*   MCV  --  93  --  93 92    265  --  259 242     Recent Labs   Lab Test 02/05/24  1143 02/01/24  0653 01/31/24  0743   CR 0.73 0.69 0.73     No lab results found.  No lab results found.    Invalid input(s): \"UC\"     "

## 2024-02-05 NOTE — DISCHARGE SUMMARY
Discharge Note    Patient discharged to home via private vehicle  accompanied by brother & mother.  IV: Discontinued  Prescriptions printed and given to patient/family.   Belongings reviewed and sent with patient and family.   Home medications returned to patient: NA  Equipment sent with: patient.   patient and family verbalizes understanding of discharge instructions. AVS given to patient.  Additional education completed? Drain Care

## 2024-02-05 NOTE — DISCHARGE SUMMARY
[facility]  Discharge Summary  Name: Marko Pastor    MRN: [id number]  YOB: 1985    Age: 38 year old  Date of Discharge:  2/5/2024  Date of Admission: 1/26/2024  Primary Care Provider: No Ref-Primary, Physician  Discharge Physician: Walter Rene MD  Discharging Service:  Hospitalist      Hospital Course/Discharge Diagnoses:    Diverticulitis with perforation and abscess  Acute blood loss anemia  Moderate malnutrition    Discharge Disposition:  Discharged to home     Allergies:  No Known Allergies     Discharge Medications:        Review of your medicines        START taking        Dose / Directions   amoxicillin-clavulanate 875-125 MG tablet  Commonly known as: AUGMENTIN  Used for: Diverticulitis of colon with perforation, Perforation of sigmoid colon due to diverticulitis      Dose: 1 tablet  Take 1 tablet by mouth 2 times daily  Quantity: 28 tablet  Refills: 0     metroNIDAZOLE 500 MG tablet  Commonly known as: FLAGYL  Used for: Diverticulitis of colon with perforation, Perforation of sigmoid colon due to diverticulitis      Dose: 500 mg  Take 1 tablet (500 mg) by mouth 3 times daily  Quantity: 28 tablet  Refills: 0     sulfamethoxazole-trimethoprim 800-160 MG tablet  Commonly known as: BACTRIM DS  Used for: Diverticulitis of colon with perforation, Perforation of sigmoid colon due to diverticulitis      Dose: 1 tablet  Take 1 tablet by mouth 2 times daily  Quantity: 28 tablet  Refills: 0            CONTINUE these medicines which have NOT CHANGED        Dose / Directions   docusate sodium 100 MG capsule  Commonly known as: COLACE      Dose: 100 mg  Take 100 mg by mouth 2 times daily  Refills: 0     oxyCODONE 5 MG tablet  Commonly known as: ROXICODONE  Used for: Diverticulitis of colon with perforation      Dose: 5 mg  Take 1 tablet (5 mg) by mouth every 6 hours as needed for severe pain (IF pain not managed with non-pharmacological and non-opioid interventions)  Quantity: 15  "tablet  Refills: 0               Where to get your medicines        These medications were sent to [facility] [place], [place] - [address] [address]  [address] [address] [facility] [address] [facility] [address]      Phone: [phone]   amoxicillin-clavulanate 875-125 MG tablet  metroNIDAZOLE 500 MG tablet  sulfamethoxazole-trimethoprim 800-160 MG tablet       Some of these will need a paper prescription and others can be bought over the counter. Ask your nurse if you have questions.    Bring a paper prescription for each of these medications  oxyCODONE 5 MG tablet         Condition on Discharge:  Discharge condition: Stable       Code status on discharge: Full Code     History of Illness:  38-year-old Hungarian-speaking male with past medical history of diverticulosis and diverticulitis who was recently hospitalized from 1/7 to 1/14 for diverticulitis and intra-abdominal abscess that was not amenable to drainage.  He was given 8 days of IV Zosyn and was discharged with additional 10 days of Augmentin.    He returned on 1/26 with recurrent pain and CT shows unchanged contained perforated sigmoid radiculitis with associated ill-defined gas/fluid collection along the superior margin of the mid sigmoid colon measuring 4 to 5 cm.  Drain was placed by colorectal surgery on 1/29 and 1/31 patient underwent laparoscopic sigmoid colectomy.    Abscess cultures growing Enterococcus and anaerobes including C. difficile.  Discussed with ID, will discharge with 2 weeks of Augmentin, Bactrim and Flagyl to cover for C. difficile.  Patient is following up with colorectal surgery on 2/28 for drain follow-up.    Physical Exam:  Vital signs:  Temp: 98.2  F (36.8  C) Temp src: Oral BP: 102/58 Pulse: 54   Resp: 20 SpO2: 97 % O2 Device: None (Room air) Oxygen Delivery: 8 LPM   Weight: 85.4 kg (188 lb 3.2 oz)  Estimated body mass index is 28.86 kg/m  as calculated from the following:    Height as of 1/12/24: 1.72 m (5' 7.72\").    Weight " as of this encounter: 85.4 kg (188 lb 3.2 oz).    Wt Readings from Last 1 Encounters:   02/04/24 85.4 kg (188 lb 3.2 oz)     General: Alert, awake, no acute distress.  HEENT: Moist mucosa.  Cardiac: RRR, S1, S2.  No murmurs appreciated.  Pulmonary: Normal chest rise, normal work of breathing.  Lungs CTA BL  Abdomen: soft, non-tender, non-distended.  Has a drain.  Extremities: no deformities.  Warm, well perfused.  Skin: no rashes or lesions noted.  Warm and Dry.  Neuro: No focal deficits noted.  Speech clear.  Coordination and strength grossly normal.  Psych: Appropriate affect.    Procedures other than Imaging:  Laparoscopic sigmoid colectomy     Imaging:  Results for orders placed or performed during the hospital encounter of 01/26/24   CT Abdomen Pelvis w Contrast    Narrative    EXAM: CT ABDOMEN PELVIS W CONTRAST  LOCATION: [facility]  DATE: 1/26/2024    INDICATION: Abdominal pain; recent perforated diverticulitis.  COMPARISON: None available.  TECHNIQUE: CT scan of the abdomen and pelvis was performed following injection of IV contrast. Multiplanar reformats were obtained. Dose reduction techniques were used.  CONTRAST: 63 mL Isovue 370.    FINDINGS:    LOWER CHEST: Mild dependent atelectasis.    HEPATOBILIARY: Mild hepatic steatosis.    Gallbladder is normal.    No intrahepatic or intrahepatic biliary ductal dilatation.    PANCREAS: Enhances normally. No peripancreatic inflammatory fat stranding.    SPLEEN: Enhances normally. Normal size.    ADRENAL GLANDS: Normal.    KIDNEYS: Both kidneys enhance symmetrically, without hydronephrosis.    No nephroureterolithiasis.    Urinary bladder is unremarkable.    PELVIC ORGANS: No suspicious abnormality.    BOWEL: Unchanged acute sigmoid diverticulitis, with contained perforation along the superior margin of the mid sigmoid colon. Ill-defined gas/fluid collection at this location measures approximately 4 x 5 cm, similar to prior. Normal appendix.  Trace free   dependent pelvic fluid, which appears reactive.    No zander intraperitoneal free air.    LYMPH NODES: No suspicious abdominal or pelvic lymphadenopathy.    VASCULATURE: No abdominal aortic aneurysm.    MUSCULOSKELETAL: No suspicious abnormality.    OTHER: No additionally significant abnormalities.      Impression    IMPRESSION:     Unchanged, contained perforated sigmoid diverticulitis, with associated ill-defined gas/fluid collection along the superior margin of the mid sigmoid colon, measuring 4 x 5 cm.   CT Retroperitoneal Abscess Drainage    Narrative    CT RETROPERITONEAL ABSCESS DRAIN WITH CATHETER PLACEMENT   1/29/2024  1:59 PM    HISTORY: 38-year-old patient with history of diverticulitis and  abscess. Patient had initial abscess identified on January 7, 2024.  Patient has been on antibiotics and abscess cavity persisted on  January 12, 2024 and increased by January 26, 2024. Therefore, request  made for drain placement, if able.    TECHNIQUE: Patient was brought to the CT department and informed  consent was obtained with an . Radiation dose for this scan  was reduced using automated exposure control, adjustment of the mA  and/or kV according to patient size, or iterative reconstruction  technique.. Patient was placed in a supine position and skin overlying  the lower abdomen was prepped and draped in standard sterile fashion.  1% lidocaine was used for local anesthetic. With CT guidance, a Yueh  needle was advanced into the collection. A superstiff Amplatz wire was  placed and after serial dilatation, a 10 [place] Leonean drain was placed.  Approximately 5 mL of bloody fluid was aspirated and sent to the lab  for evaluation. Majority of the collection is air. Patient tolerated  the procedure relatively well. The catheter was sutured in position  and placed to bulb suction for the time being. 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    Sedation:  A moderate level of sedation was achieved with 2 mg IV  Versed and 100 mcg IV fentanyl.  Sedation time: 25 minutes  Please note the above medications were administered by the radiology  nursing staff under my direct supervision. Patient's vital signs were  monitored and remained stable throughout the procedure.  Contrast: None  Local anesthetic: 20 mL of 1% lidocaine    FINDINGS: A total of 9 noncontrast CT images were obtained through the  lower abdomen/pelvis during placement of catheter, wire, and drainage  catheter by completion. Drainage catheter in good position by  completion, adjacent to sigmoid colon, which is thickened.      Impression    IMPRESSION: Successful placement of 10 Lebanese drainage catheter into  collection adjacent to the sigmoid colon. Majority of collection is  air and 5 mL of bloody fluid aspirated and sent to the lab for  evaluation. 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 However, we will begin treatment with flushing the  drainage catheter with 10 mL of normal saline 3 times daily as well as  bulb suction. Suspect this can eventually be tailored to no fluid  flushing and gravity drainage.     MARIO FOSTER MD         SYSTEM ID:  [id number]        Consultations:  Colorectal surgery and infectious disease.       Recent Lab Results:  Recent Labs   Lab 02/03/24  0748 02/02/24  0712 02/01/24  0653 01/31/24  0743   WBC  --  8.3  --  7.5   HGB  --  12.3* 12.3* 13.3   HCT  --  37.2*  --  40.6   MCV  --  93  --  93    265  --  259          Lab Results   Component Value Date     02/01/2024     01/31/2024     01/27/2024    Lab Results   Component Value Date    CHLORIDE 103 02/01/2024    CHLORIDE 102 01/31/2024    CHLORIDE 100 01/27/2024    Lab Results   Component Value Date    BUN 5.4 02/01/2024    BUN 3.1 01/31/2024    BUN 5.7 01/27/2024      Lab Results   Component Value Date    POTASSIUM 3.6 02/01/2024    POTASSIUM 3.8 01/31/2024     POTASSIUM 3.7 01/27/2024    Lab Results   Component Value Date    CO2 27 02/01/2024    CO2 28 01/31/2024    CO2 26 01/27/2024    Lab Results   Component Value Date    CR 0.73 02/05/2024    CR 0.69 02/01/2024    CR 0.73 01/31/2024             Pending Results:    Unresulted Labs Ordered in the Past 30 Days of this Admission       No orders found from 12/27/2023 to 1/27/2024.             Discharge Instructions and Follow-Up:   Discharge Procedure Orders   Follow-up and recommended labs and tests    Order Comments: Follow up in the [facility] office on 2/28/24 with Dr. Dale at 8:40AM, please arrive 15 minutes early for paperwork.  Call [phone] to reschedule your appointment as needed. Call the office at [phone] if you develop fever, uncontrolled pain, bleeding, nausea, vomiting, or constipation.    [place] Office:   [address]  [place] [address]     Reason for your hospital stay   Order Comments: Perforated diverticulitis     Activity   Order Comments: Your activity upon discharge: activity as tolerated     Order Specific Question Answer Comments   Is discharge order? Yes      Diet   Order Comments: Follow this diet upon discharge: Orders Placed This Encounter      Snacks/Supplements Adult: Ensure Enlive; With Meals      Low Fiber Diet     Order Specific Question Answer Comments   Is discharge order? Yes        Total time spent in face to face contact with the patient and coordinating discharge was: 45 minutes minutes.

## 2024-02-05 NOTE — PROGRESS NOTES
To Do:  End of Shift Summary  For vital signs and complete assessments, please see documentation flowsheets.     Pertinent assessments: Pt A&Ox4. VSS on RA. Afebrile. Hebrew speaking,  services utilized. Denies SOB, N/V. Reported abd pain, prn PO 5 mg oxy given x1, tylenol x2. Up independent in room. Diet advanced to low fiber, tolerating well. Abd lap sites x3 liquid bandage open to air, EUN drain pink tinged output with few clots, dressing CDI. PIV x2 SL. IV Zosyn and IV Flagyl given.     Major Shift Events: Uneventful.    Treatment Plan: IV Flagyl and zosyn, Pain mangement, ID, Diet advancement.     Bedside Nurse: Cecilia Clark RN.

## 2024-02-05 NOTE — PROGRESS NOTES
To Do:  End of Shift Summary  For vital signs and complete assessments, please see documentation flowsheets.     Pertinent assessments:Pt A&Ox4. VSS on RA. Afebrile. Italian speaking,  services utilized. Denies SOB, N/V. Reported abd pain, tylenol given. Up independent in room. Low fiber diet, tolerating well. Zosyn given. Abd lap sites x3 liquid bandage open to air, EUN drain pink tinged output with few clots, dressing CDI. Discharge home this afternoon, just waiting for family to arrive for transportation. Pt will discharge home with EUN drain in place. Discharge instructions given to pt with interpretation services at bedside. Pt instructed to  medications at the [facility] pharmacy. IV's removed. Pt medically stable to discharge.    Major Shift Events Discharge planning.      Treatment Plan: Discharge.     Bedside Nurse: Cecilia Clark RN

## 2024-02-07 ENCOUNTER — HOSPITAL ENCOUNTER (OUTPATIENT)
Dept: CT IMAGING | Facility: CLINIC | Age: 39
Discharge: HOME | End: 2024-02-07

## 2024-02-07 ENCOUNTER — PATIENT OUTREACH (OUTPATIENT)
Dept: CARE COORDINATION | Facility: CLINIC | Age: 39
End: 2024-02-07

## 2024-02-07 DIAGNOSIS — K57.80 DIVERTICULITIS OF INTESTINE WITH ABSCESS: ICD-10-CM

## 2024-02-07 PROCEDURE — 250N000011 HC RX IP 250 OP 636

## 2024-02-07 PROCEDURE — 74177 CT ABD & PELVIS W/CONTRAST: CPT

## 2024-02-07 PROCEDURE — 250N000009 HC RX 250

## 2024-02-07 RX ORDER — IOPAMIDOL 755 MG/ML
94 INJECTION, SOLUTION INTRAVASCULAR ONCE
Status: COMPLETED | OUTPATIENT
Start: 2024-02-07 | End: 2024-02-07

## 2024-02-07 RX ADMIN — SODIUM CHLORIDE 64 ML: 9 INJECTION, SOLUTION INTRAVENOUS at 09:06

## 2024-02-07 RX ADMIN — IOPAMIDOL 94 ML: 755 INJECTION, SOLUTION INTRAVENOUS at 09:06

## 2024-02-07 NOTE — PROGRESS NOTES
[facility]:   [facility] Contact  [facility]/Voicemail     Clinical Data: Post-Discharge Outreach     Outreach attempted x 2.  Left message on patient's voicemail, providing [facility]'s central phone number of [phone] ([phone]) for questions/concerns and/or to schedule an appt with an [facility] provider, if they do not have a PCP.      Plan:  [facility] will do no further outreaches at this time.       Lian Reno  Community Health Worker  [facility], [facility]  [phone]      *Connected Care Resource Team does NOT follow patient ongoing. Referrals are identified based on internal discharge reports and the outreach is to ensure patient has an understanding of their discharge instructions.

## 2024-02-12 LAB — COLONOSCOPY: NORMAL

## 2024-03-21 ENCOUNTER — APPOINTMENT (OUTPATIENT)
Dept: INTERPRETER SERVICES | Facility: CLINIC | Age: 39
End: 2024-03-21

## 2024-11-11 NOTE — PLAN OF CARE
Care from [id number]    Admit Date: 1/26/2024  Admitting Diagnosis: Diverticulitis POD 2  Pertinent: Trinidadian speaking      Isolation Precautions:   Patient is on Enteric precuations for C-Diff.    Neuro: Alert and Oriented x4  Activity: are independent with no assistive devices   Telemetry Monitoring: No  Pain: complaining of 8/10 pain in their abdomen.  Tylenol and Oxycodone given for pain.  Labs / Tests: POD2 Blood glucose  GI: bowel sounds audible. Denies nausea. Abdominal discomfort  : voiding spontaneously, ambulating to the bathroom  LDA's: Peripheral  Fluids: is Saline locked.  Diet: Full liquid  Incision site: 3 lap sites that are CDI  EUN drain: dressing CDI. Out put see flowsheet  Consults: ID and Colorectal  Discharge Disposition:  TBD    Plan of Care:  Abx: cipro and flagyl     Pain management   Post-Op Assessment Note    CV Status:  Stable  Pain Score: 0    Pain management: adequate     Mental Status:  Arousable   Hydration Status:  Stable   PONV Controlled:  None   Airway Patency:  Patent      Post Op Vitals Reviewed: Yes      Staff: CRNA         No complications documented      BP  160/74   Temp  97   Pulse  72   Resp   16   SpO2   98% 12-Nov-2024 00:10

## (undated) DEVICE — STPL CIRCULAR 29MM CVD CDH29P

## (undated) DEVICE — Device

## (undated) DEVICE — PREP CHLORAPREP 26ML TINTED HI-LITE ORANGE 930815

## (undated) DEVICE — LINEN FULL SHEET 5511

## (undated) DEVICE — SU DERMABOND ADVANCED .7ML DNX12

## (undated) DEVICE — SU VICRYL 0 UR-6 27" J603H

## (undated) DEVICE — LINEN TOWEL PACK X10 5473

## (undated) DEVICE — SU PROLENE 2-0 SHDA 48" 8533H

## (undated) DEVICE — SU VICRYL 3-0 SH 27" J316H

## (undated) DEVICE — DRAPE LEGGINGS 8421

## (undated) DEVICE — DRAPE IOBAN INCISE 23X17" 6650EZ

## (undated) DEVICE — ESU GROUND PAD ADULT W/CORD E7507

## (undated) DEVICE — STPL POWERED ECHELON 60MM PSEE60A

## (undated) DEVICE — SUCTION TIP POOLE K770

## (undated) DEVICE — PROTECTOR ARM ONE-STEP TRENDELENBURG 40418

## (undated) DEVICE — PAD CHUX UNDERPAD 30X36" P3036C

## (undated) DEVICE — ESU PENCIL W/HOLSTER E2350H

## (undated) DEVICE — DRAIN BLAKE 19FR W/TROCAR SIL

## (undated) DEVICE — DRAPE MAYO STAND 23X54 8337

## (undated) DEVICE — LINEN DRAPE 54X72" 5467

## (undated) DEVICE — SPONGE LAP 18X18" X8435

## (undated) DEVICE — ENDO TROCAR FIRST ENTRY KII FIOS Z-THRD 05X100MM CTF03

## (undated) DEVICE — SOL NACL 0.9% IRRIG 1000ML BOTTLE 2F7124

## (undated) DEVICE — GLOVE BIOGEL PI MICRO SZ 7.0 48570

## (undated) DEVICE — SOL WATER IRRIG 1000ML BOTTLE 2F7114

## (undated) DEVICE — SU ETHILON 2-0 PS 18" 585H

## (undated) DEVICE — SU PDS II 0 CTX 60" Z990G

## (undated) DEVICE — CATH TRAY FOLEY SURESTEP 16FR DRAIN BAG STATOCK A899916

## (undated) DEVICE — SU VICRYL 2-0 TIE 12X18" J905T

## (undated) DEVICE — SUCTION TIP YANKAUER W/O VENT K86

## (undated) DEVICE — DRAIN JACKSON PRATT RESERVOIR 100ML SU130-1305

## (undated) DEVICE — ESU LIGASURE LAPAROSCOPIC BLUNT TIP SEALER 5MMX37CM LF1837

## (undated) DEVICE — ENDO TROCAR SLEEVE KII Z-THREADED 05X100MM CTS02

## (undated) DEVICE — SUCTION IRR STRYKERFLOW II W/TIP 250-070-520

## (undated) DEVICE — SU MONOCRYL 4-0 PS-2 27" UND Y426H

## (undated) DEVICE — ANTIFOG SOLUTION SEE SHARP 150M TROCAR SWABS 30978

## (undated) DEVICE — LINEN HALF SHEET 5512

## (undated) DEVICE — LINEN POUCH DBL 5427

## (undated) DEVICE — KIT PROCEDURE W/CLEAN-A-SCOPE LINERS V2 200800

## (undated) DEVICE — BAG CLEAR TRASH 1.3M 39X33" P4040C

## (undated) DEVICE — SOL NACL 0.9% IRRIG 3000ML BAG 2B7477

## (undated) DEVICE — TUBING SUCTION MEDI-VAC SOFT 3/16"X20' N520A

## (undated) DEVICE — GOWN XLG DISP 9545

## (undated) DEVICE — BLADE KNIFE SURG 10 371110

## (undated) DEVICE — BLADE CLIPPER 3M 9670

## (undated) DEVICE — SUCTION MANIFOLD NEPTUNE 2 SYS 4 PORT 0702-020-000

## (undated) DEVICE — ENDO TROCAR BLUNT TIP KII BALLOON 12X100MM C0R47

## (undated) RX ORDER — LIDOCAINE HYDROCHLORIDE AND EPINEPHRINE 10; 10 MG/ML; UG/ML
INJECTION, SOLUTION INFILTRATION; PERINEURAL
Status: DISPENSED
Start: 2024-01-31

## (undated) RX ORDER — GLYCOPYRROLATE 0.2 MG/ML
INJECTION, SOLUTION INTRAMUSCULAR; INTRAVENOUS
Status: DISPENSED
Start: 2024-01-31

## (undated) RX ORDER — PROPOFOL 10 MG/ML
INJECTION, EMULSION INTRAVENOUS
Status: DISPENSED
Start: 2024-01-31

## (undated) RX ORDER — ONDANSETRON 2 MG/ML
INJECTION INTRAMUSCULAR; INTRAVENOUS
Status: DISPENSED
Start: 2024-01-31

## (undated) RX ORDER — METRONIDAZOLE 500 MG/100ML
INJECTION, SOLUTION INTRAVENOUS
Status: DISPENSED
Start: 2024-01-31

## (undated) RX ORDER — BUPIVACAINE HYDROCHLORIDE 5 MG/ML
INJECTION, SOLUTION EPIDURAL; INTRACAUDAL
Status: DISPENSED
Start: 2024-01-31

## (undated) RX ORDER — CEFAZOLIN SODIUM/WATER 2 G/20 ML
SYRINGE (ML) INTRAVENOUS
Status: DISPENSED
Start: 2024-01-31

## (undated) RX ORDER — FENTANYL CITRATE 50 UG/ML
INJECTION, SOLUTION INTRAMUSCULAR; INTRAVENOUS
Status: DISPENSED
Start: 2024-01-29

## (undated) RX ORDER — DEXAMETHASONE SODIUM PHOSPHATE 4 MG/ML
INJECTION, SOLUTION INTRA-ARTICULAR; INTRALESIONAL; INTRAMUSCULAR; INTRAVENOUS; SOFT TISSUE
Status: DISPENSED
Start: 2024-01-31

## (undated) RX ORDER — HEPARIN SODIUM 5000 [USP'U]/.5ML
INJECTION, SOLUTION INTRAVENOUS; SUBCUTANEOUS
Status: DISPENSED
Start: 2024-01-31

## (undated) RX ORDER — FENTANYL CITRATE 50 UG/ML
INJECTION, SOLUTION INTRAMUSCULAR; INTRAVENOUS
Status: DISPENSED
Start: 2024-01-31